# Patient Record
Sex: FEMALE | Race: WHITE | NOT HISPANIC OR LATINO | Employment: UNEMPLOYED | URBAN - METROPOLITAN AREA
[De-identification: names, ages, dates, MRNs, and addresses within clinical notes are randomized per-mention and may not be internally consistent; named-entity substitution may affect disease eponyms.]

---

## 2024-11-04 ENCOUNTER — HOSPITAL ENCOUNTER (INPATIENT)
Facility: HOSPITAL | Age: 54
End: 2024-11-04
Attending: INTERNAL MEDICINE | Admitting: INTERNAL MEDICINE
Payer: COMMERCIAL

## 2024-11-04 PROBLEM — S06.5XAA SDH (SUBDURAL HEMATOMA) (MULTI): Status: ACTIVE | Noted: 2024-11-04

## 2024-11-04 PROCEDURE — 1180000001 HC REHAB PRIVATE ROOM DAILY

## 2024-11-04 PROCEDURE — 2500000001 HC RX 250 WO HCPCS SELF ADMINISTERED DRUGS (ALT 637 FOR MEDICARE OP): Performed by: INTERNAL MEDICINE

## 2024-11-04 RX ORDER — ESCITALOPRAM OXALATE 10 MG/1
10 TABLET ORAL DAILY
Status: DISPENSED | OUTPATIENT
Start: 2024-11-05

## 2024-11-04 RX ORDER — POLYETHYLENE GLYCOL 3350 17 G/17G
17 POWDER, FOR SOLUTION ORAL DAILY PRN
Status: ACTIVE | OUTPATIENT
Start: 2024-11-04

## 2024-11-04 RX ORDER — ACETAMINOPHEN 160 MG/5ML
650 SOLUTION ORAL EVERY 4 HOURS PRN
Status: DISPENSED | OUTPATIENT
Start: 2024-11-04

## 2024-11-04 RX ORDER — GLYCOPYRROLATE 1 MG/1
1 TABLET ORAL DAILY
Status: DISPENSED | OUTPATIENT
Start: 2024-11-05

## 2024-11-04 RX ORDER — DEXTROMETHORPHAN HYDROBROMIDE AND QUINIDINE SULFATE 20; 10 MG/1; MG/1
1 CAPSULE, GELATIN COATED ORAL 2 TIMES DAILY
Status: ON HOLD | COMMUNITY
Start: 2024-09-10

## 2024-11-04 RX ORDER — LISINOPRIL 10 MG/1
10 TABLET ORAL DAILY
Status: DISPENSED | OUTPATIENT
Start: 2024-11-05

## 2024-11-04 RX ORDER — BISACODYL 5 MG
10 TABLET, DELAYED RELEASE (ENTERIC COATED) ORAL DAILY PRN
Status: ACTIVE | OUTPATIENT
Start: 2024-11-04

## 2024-11-04 RX ORDER — LISINOPRIL 10 MG/1
10 TABLET ORAL
Status: ON HOLD | COMMUNITY
Start: 2024-09-10

## 2024-11-04 RX ORDER — ALUMINUM HYDROXIDE, MAGNESIUM HYDROXIDE, AND SIMETHICONE 2400; 240; 2400 MG/30ML; MG/30ML; MG/30ML
30 SUSPENSION ORAL EVERY 6 HOURS PRN
Status: ACTIVE | OUTPATIENT
Start: 2024-11-04

## 2024-11-04 RX ORDER — ROPINIROLE 0.5 MG/1
0.5 TABLET, FILM COATED ORAL NIGHTLY
Status: DISCONTINUED | OUTPATIENT
Start: 2024-11-04 | End: 2024-11-05

## 2024-11-04 RX ORDER — PRAMIPEXOLE DIHYDROCHLORIDE 0.5 MG/1
1 TABLET ORAL ONCE
Status: ON HOLD | COMMUNITY
Start: 2024-09-10

## 2024-11-04 RX ORDER — ACETAMINOPHEN 325 MG/1
650 TABLET ORAL EVERY 4 HOURS PRN
Status: ACTIVE | OUTPATIENT
Start: 2024-11-04

## 2024-11-04 RX ORDER — GLYCOPYRROLATE 1 MG/5ML
1 SOLUTION ORAL
Status: ON HOLD | COMMUNITY
Start: 2024-11-05

## 2024-11-04 SDOH — ECONOMIC STABILITY: HOUSING INSECURITY
IN THE LAST 12 MONTHS, WAS THERE A TIME WHEN YOU WERE NOT ABLE TO PAY THE MORTGAGE OR RENT ON TIME?: PATIENT UNABLE TO ANSWER

## 2024-11-04 SDOH — ECONOMIC STABILITY: TRANSPORTATION INSECURITY
IN THE PAST 12 MONTHS, HAS LACK OF TRANSPORTATION KEPT YOU FROM MEDICAL APPOINTMENTS OR FROM GETTING MEDICATIONS?: PATIENT UNABLE TO ANSWER

## 2024-11-04 SDOH — SOCIAL STABILITY: SOCIAL INSECURITY: ARE THERE ANY APPARENT SIGNS OF INJURIES/BEHAVIORS THAT COULD BE RELATED TO ABUSE/NEGLECT?: NO

## 2024-11-04 SDOH — HEALTH STABILITY: MENTAL HEALTH: HOW MANY DRINKS CONTAINING ALCOHOL DO YOU HAVE ON A TYPICAL DAY WHEN YOU ARE DRINKING?: PATIENT UNABLE TO ANSWER

## 2024-11-04 SDOH — SOCIAL STABILITY: SOCIAL INSECURITY: DOES ANYONE TRY TO KEEP YOU FROM HAVING/CONTACTING OTHER FRIENDS OR DOING THINGS OUTSIDE YOUR HOME?: NO

## 2024-11-04 SDOH — HEALTH STABILITY: MENTAL HEALTH: HOW OFTEN DO YOU HAVE SIX OR MORE DRINKS ON ONE OCCASION?: PATIENT UNABLE TO ANSWER

## 2024-11-04 SDOH — SOCIAL STABILITY: SOCIAL NETWORK
DO YOU BELONG TO ANY CLUBS OR ORGANIZATIONS SUCH AS CHURCH GROUPS, UNIONS, FRATERNAL OR ATHLETIC GROUPS, OR SCHOOL GROUPS?: PATIENT UNABLE TO ANSWER

## 2024-11-04 SDOH — ECONOMIC STABILITY: FOOD INSECURITY
WITHIN THE PAST 12 MONTHS, THE FOOD YOU BOUGHT JUST DIDN'T LAST AND YOU DIDN'T HAVE MONEY TO GET MORE.: PATIENT UNABLE TO ANSWER

## 2024-11-04 SDOH — SOCIAL STABILITY: SOCIAL INSECURITY: HAVE YOU HAD THOUGHTS OF HARMING ANYONE ELSE?: NO

## 2024-11-04 SDOH — ECONOMIC STABILITY: HOUSING INSECURITY: AT ANY TIME IN THE PAST 12 MONTHS, WERE YOU HOMELESS OR LIVING IN A SHELTER (INCLUDING NOW)?: PATIENT UNABLE TO ANSWER

## 2024-11-04 SDOH — SOCIAL STABILITY: SOCIAL INSECURITY: HAVE YOU HAD ANY THOUGHTS OF HARMING ANYONE ELSE?: NO

## 2024-11-04 SDOH — SOCIAL STABILITY: SOCIAL INSECURITY: DO YOU FEEL ANYONE HAS EXPLOITED OR TAKEN ADVANTAGE OF YOU FINANCIALLY OR OF YOUR PERSONAL PROPERTY?: NO

## 2024-11-04 SDOH — HEALTH STABILITY: MENTAL HEALTH: EXPERIENCED ANY OF THE FOLLOWING LIFE EVENTS: OTHER (COMMENT)

## 2024-11-04 SDOH — SOCIAL STABILITY: SOCIAL INSECURITY: ARE YOU OR HAVE YOU BEEN THREATENED OR ABUSED PHYSICALLY, EMOTIONALLY, OR SEXUALLY BY ANYONE?: NO

## 2024-11-04 SDOH — HEALTH STABILITY: MENTAL HEALTH
DO YOU FEEL STRESS - TENSE, RESTLESS, NERVOUS, OR ANXIOUS, OR UNABLE TO SLEEP AT NIGHT BECAUSE YOUR MIND IS TROUBLED ALL THE TIME - THESE DAYS?: PATIENT UNABLE TO ANSWER

## 2024-11-04 SDOH — ECONOMIC STABILITY: INCOME INSECURITY
IN THE PAST 12 MONTHS HAS THE ELECTRIC, GAS, OIL, OR WATER COMPANY THREATENED TO SHUT OFF SERVICES IN YOUR HOME?: PATIENT UNABLE TO ANSWER

## 2024-11-04 SDOH — SOCIAL STABILITY: SOCIAL INSECURITY
WITHIN THE LAST YEAR, HAVE YOU BEEN HUMILIATED OR EMOTIONALLY ABUSED IN OTHER WAYS BY YOUR PARTNER OR EX-PARTNER?: PATIENT UNABLE TO ANSWER

## 2024-11-04 SDOH — SOCIAL STABILITY: SOCIAL INSECURITY: ARE YOU MARRIED, WIDOWED, DIVORCED, SEPARATED, NEVER MARRIED, OR LIVING WITH A PARTNER?: PATIENT UNABLE TO ANSWER

## 2024-11-04 SDOH — SOCIAL STABILITY: SOCIAL INSECURITY: HAS ANYONE EVER THREATENED TO HURT YOUR FAMILY OR YOUR PETS?: NO

## 2024-11-04 SDOH — SOCIAL STABILITY: SOCIAL NETWORK: HOW OFTEN DO YOU ATTEND MEETINGS OF THE CLUBS OR ORGANIZATIONS YOU BELONG TO?: PATIENT UNABLE TO ANSWER

## 2024-11-04 SDOH — HEALTH STABILITY: PHYSICAL HEALTH
HOW OFTEN DO YOU NEED TO HAVE SOMEONE HELP YOU WHEN YOU READ INSTRUCTIONS, PAMPHLETS, OR OTHER WRITTEN MATERIAL FROM YOUR DOCTOR OR PHARMACY?: PATIENT UNABLE TO RESPOND

## 2024-11-04 SDOH — ECONOMIC STABILITY: FOOD INSECURITY
WITHIN THE PAST 12 MONTHS, YOU WORRIED THAT YOUR FOOD WOULD RUN OUT BEFORE YOU GOT THE MONEY TO BUY MORE.: PATIENT UNABLE TO ANSWER

## 2024-11-04 SDOH — ECONOMIC STABILITY: FOOD INSECURITY
HOW HARD IS IT FOR YOU TO PAY FOR THE VERY BASICS LIKE FOOD, HOUSING, MEDICAL CARE, AND HEATING?: PATIENT UNABLE TO ANSWER

## 2024-11-04 SDOH — SOCIAL STABILITY: SOCIAL NETWORK: IN A TYPICAL WEEK, HOW MANY TIMES DO YOU TALK ON THE PHONE WITH FAMILY, FRIENDS, OR NEIGHBORS?: PATIENT UNABLE TO ANSWER

## 2024-11-04 SDOH — HEALTH STABILITY: PHYSICAL HEALTH
ON AVERAGE, HOW MANY DAYS PER WEEK DO YOU ENGAGE IN MODERATE TO STRENUOUS EXERCISE (LIKE A BRISK WALK)?: PATIENT UNABLE TO ANSWER

## 2024-11-04 SDOH — SOCIAL STABILITY: SOCIAL NETWORK: HOW OFTEN DO YOU GET TOGETHER WITH FRIENDS OR RELATIVES?: PATIENT UNABLE TO ANSWER

## 2024-11-04 SDOH — SOCIAL STABILITY: SOCIAL INSECURITY: WITHIN THE LAST YEAR, HAVE YOU BEEN AFRAID OF YOUR PARTNER OR EX-PARTNER?: PATIENT UNABLE TO ANSWER

## 2024-11-04 SDOH — HEALTH STABILITY: MENTAL HEALTH: HOW OFTEN DO YOU HAVE A DRINK CONTAINING ALCOHOL?: PATIENT UNABLE TO ANSWER

## 2024-11-04 SDOH — SOCIAL STABILITY: SOCIAL INSECURITY
WITHIN THE LAST YEAR, HAVE YOU BEEN RAPED OR FORCED TO HAVE ANY KIND OF SEXUAL ACTIVITY BY YOUR PARTNER OR EX-PARTNER?: PATIENT UNABLE TO ANSWER

## 2024-11-04 SDOH — SOCIAL STABILITY: SOCIAL INSECURITY
WITHIN THE LAST YEAR, HAVE YOU BEEN KICKED, HIT, SLAPPED, OR OTHERWISE PHYSICALLY HURT BY YOUR PARTNER OR EX-PARTNER?: PATIENT UNABLE TO ANSWER

## 2024-11-04 SDOH — SOCIAL STABILITY: SOCIAL NETWORK: HOW OFTEN DO YOU ATTEND CHURCH OR RELIGIOUS SERVICES?: PATIENT UNABLE TO ANSWER

## 2024-11-04 SDOH — SOCIAL STABILITY: SOCIAL INSECURITY: DO YOU FEEL UNSAFE GOING BACK TO THE PLACE WHERE YOU ARE LIVING?: NO

## 2024-11-04 SDOH — SOCIAL STABILITY: SOCIAL INSECURITY: ABUSE: ADULT

## 2024-11-04 ASSESSMENT — LIFESTYLE VARIABLES
SKIP TO QUESTIONS 9-10: 0
PRESCIPTION_ABUSE_PAST_12_MONTHS: NO
HOW MANY STANDARD DRINKS CONTAINING ALCOHOL DO YOU HAVE ON A TYPICAL DAY: PATIENT UNABLE TO ANSWER
HOW OFTEN DO YOU HAVE 6 OR MORE DRINKS ON ONE OCCASION: PATIENT UNABLE TO ANSWER
AUDIT-C TOTAL SCORE: -1
HOW OFTEN DO YOU HAVE A DRINK CONTAINING ALCOHOL: PATIENT UNABLE TO ANSWER
SKIP TO QUESTIONS 9-10: 0
SUBSTANCE_ABUSE_PAST_12_MONTHS: NO
AUDIT-C TOTAL SCORE: -1
AUDIT-C TOTAL SCORE: -1

## 2024-11-04 ASSESSMENT — ACTIVITIES OF DAILY LIVING (ADL)
BATHING: REQUIRED ASSISTANCE
DOMESTIC_CHORES: REQUIRED ASSISTANCE
LACK_OF_TRANSPORTATION: PATIENT UNABLE TO ANSWER
LACK_OF_TRANSPORTATION: PATIENT UNABLE TO ANSWER
BLADDER: INCONTINENT
BOWEL: INCONTINENT
TOILETING: REQUIRED ASSISTANCE
DRESSING: REQUIRED ASSISTANCE
ASSISTIVE_DEVICE: WHEELCHAIR

## 2024-11-04 ASSESSMENT — COLUMBIA-SUICIDE SEVERITY RATING SCALE - C-SSRS
6. HAVE YOU EVER DONE ANYTHING, STARTED TO DO ANYTHING, OR PREPARED TO DO ANYTHING TO END YOUR LIFE?: NO
1. IN THE PAST MONTH, HAVE YOU WISHED YOU WERE DEAD OR WISHED YOU COULD GO TO SLEEP AND NOT WAKE UP?: NO
2. HAVE YOU ACTUALLY HAD ANY THOUGHTS OF KILLING YOURSELF?: NO

## 2024-11-04 ASSESSMENT — BRIEF INTERVIEW FOR MENTAL STATUS (BIMS)
WHAT YEAR IS IT: NO ANSWER
COGNITIVE PATTERN ASSESSMENT USED: STAFF ASSESSMENT;BIMS
WHAT MONTH IS IT: NO ANSWER
INITIAL REPETITION OF BED BLUE SOCK - FIRST ATTEMPT: NO ANSWER
ASKED TO RECALL BLUE: NO ANSWER
ASKED TO RECALL BED: NO ANSWER
GENERAL MEMORY AND RECALL ABILITY: RECOGNIZES APPROPRIATE HEALTHCARE SETTING;CURRENT SEASON
WHAT DAY OF THE WEEK IS IT: NO ANSWER
ASKED TO RECALL SOCK: NO ANSWER
BIMS SUMMARY SCORE: 99

## 2024-11-04 ASSESSMENT — PATIENT HEALTH QUESTIONNAIRE - PHQ9
SUM OF ALL RESPONSES TO PHQ9 QUESTIONS 1 & 2: 2
2. FEELING DOWN, DEPRESSED OR HOPELESS: SEVERAL DAYS
1. LITTLE INTEREST OR PLEASURE IN DOING THINGS: SEVERAL DAYS

## 2024-11-04 ASSESSMENT — PAIN - FUNCTIONAL ASSESSMENT: PAIN_FUNCTIONAL_ASSESSMENT: 0-10

## 2024-11-04 ASSESSMENT — PAIN SCALES - GENERAL: PAINLEVEL_OUTOF10: 0 - NO PAIN

## 2024-11-05 PROBLEM — G23.1 PROGRESSIVE SUPRANUCLEAR PALSY (MULTI): Status: ACTIVE | Noted: 2024-11-05

## 2024-11-05 PROBLEM — G20.B2 PARKINSON'S DISEASE WITH DYSKINESIA AND FLUCTUATING MANIFESTATIONS: Status: ACTIVE | Noted: 2024-11-05

## 2024-11-05 LAB
ANION GAP SERPL CALCULATED.3IONS-SCNC: 13 MMOL/L (ref 10–20)
BUN SERPL-MCNC: 33 MG/DL (ref 6–23)
CALCIUM SERPL-MCNC: 9.7 MG/DL (ref 8.6–10.3)
CHLORIDE SERPL-SCNC: 104 MMOL/L (ref 98–107)
CO2 SERPL-SCNC: 28 MMOL/L (ref 21–32)
CREAT SERPL-MCNC: 0.57 MG/DL (ref 0.5–1.05)
EGFRCR SERPLBLD CKD-EPI 2021: >90 ML/MIN/1.73M*2
ERYTHROCYTE [DISTWIDTH] IN BLOOD BY AUTOMATED COUNT: 12.8 % (ref 11.5–14.5)
GLUCOSE SERPL-MCNC: 96 MG/DL (ref 74–99)
HCT VFR BLD AUTO: 41.4 % (ref 36–46)
HGB BLD-MCNC: 13.3 G/DL (ref 12–16)
MCH RBC QN AUTO: 30.9 PG (ref 26–34)
MCHC RBC AUTO-ENTMCNC: 32.1 G/DL (ref 32–36)
MCV RBC AUTO: 96 FL (ref 80–100)
NRBC BLD-RTO: 0 /100 WBCS (ref 0–0)
PLATELET # BLD AUTO: 281 X10*3/UL (ref 150–450)
POTASSIUM SERPL-SCNC: 4.6 MMOL/L (ref 3.5–5.3)
RBC # BLD AUTO: 4.3 X10*6/UL (ref 4–5.2)
SODIUM SERPL-SCNC: 140 MMOL/L (ref 136–145)
WBC # BLD AUTO: 7.1 X10*3/UL (ref 4.4–11.3)

## 2024-11-05 PROCEDURE — 2500000001 HC RX 250 WO HCPCS SELF ADMINISTERED DRUGS (ALT 637 FOR MEDICARE OP): Performed by: INTERNAL MEDICINE

## 2024-11-05 PROCEDURE — 97112 NEUROMUSCULAR REEDUCATION: CPT | Mod: GP

## 2024-11-05 PROCEDURE — 97163 PT EVAL HIGH COMPLEX 45 MIN: CPT | Mod: GP

## 2024-11-05 PROCEDURE — 99223 1ST HOSP IP/OBS HIGH 75: CPT | Performed by: INTERNAL MEDICINE

## 2024-11-05 PROCEDURE — 97116 GAIT TRAINING THERAPY: CPT | Mod: GP

## 2024-11-05 PROCEDURE — 97530 THERAPEUTIC ACTIVITIES: CPT | Mod: GP

## 2024-11-05 PROCEDURE — 97110 THERAPEUTIC EXERCISES: CPT | Mod: GP

## 2024-11-05 PROCEDURE — 97530 THERAPEUTIC ACTIVITIES: CPT | Mod: GO,CO

## 2024-11-05 PROCEDURE — 92523 SPEECH SOUND LANG COMPREHEN: CPT | Mod: GN

## 2024-11-05 PROCEDURE — 97535 SELF CARE MNGMENT TRAINING: CPT | Mod: GO

## 2024-11-05 PROCEDURE — 92610 EVALUATE SWALLOWING FUNCTION: CPT | Mod: GN

## 2024-11-05 PROCEDURE — 80048 BASIC METABOLIC PNL TOTAL CA: CPT | Performed by: INTERNAL MEDICINE

## 2024-11-05 PROCEDURE — 1180000001 HC REHAB PRIVATE ROOM DAILY

## 2024-11-05 PROCEDURE — 36415 COLL VENOUS BLD VENIPUNCTURE: CPT | Performed by: INTERNAL MEDICINE

## 2024-11-05 PROCEDURE — 97166 OT EVAL MOD COMPLEX 45 MIN: CPT | Mod: GO

## 2024-11-05 PROCEDURE — 85027 COMPLETE CBC AUTOMATED: CPT | Performed by: INTERNAL MEDICINE

## 2024-11-05 RX ORDER — ESCITALOPRAM OXALATE 10 MG/1
10 TABLET ORAL
Status: ON HOLD | COMMUNITY
Start: 2024-09-10

## 2024-11-05 RX ORDER — PRAMIPEXOLE DIHYDROCHLORIDE 0.25 MG/1
0.5 TABLET ORAL NIGHTLY
Status: DISPENSED | OUTPATIENT
Start: 2024-11-05

## 2024-11-05 RX ORDER — ESCITALOPRAM OXALATE 20 MG/1
0.5 TABLET ORAL
Status: ON HOLD | COMMUNITY
Start: 2024-05-09 | End: 2024-11-05 | Stop reason: SDUPTHER

## 2024-11-05 RX ORDER — CARBIDOPA AND LEVODOPA 25; 100 MG/1; MG/1
2 TABLET ORAL 3 TIMES DAILY
Status: ON HOLD | COMMUNITY
Start: 2024-09-10 | End: 2024-12-09

## 2024-11-05 RX ORDER — NYSTATIN 100000 [USP'U]/ML
5 SUSPENSION ORAL 3 TIMES DAILY
Status: DISPENSED | OUTPATIENT
Start: 2024-11-05 | End: 2024-11-12

## 2024-11-05 SDOH — ECONOMIC STABILITY: FOOD INSECURITY: WITHIN THE PAST 12 MONTHS, YOU WORRIED THAT YOUR FOOD WOULD RUN OUT BEFORE YOU GOT THE MONEY TO BUY MORE.: NEVER TRUE

## 2024-11-05 SDOH — ECONOMIC STABILITY: FOOD INSECURITY: WITHIN THE PAST 12 MONTHS, THE FOOD YOU BOUGHT JUST DIDN'T LAST AND YOU DIDN'T HAVE MONEY TO GET MORE.: NEVER TRUE

## 2024-11-05 SDOH — SOCIAL STABILITY: SOCIAL INSECURITY: WITHIN THE LAST YEAR, HAVE YOU BEEN AFRAID OF YOUR PARTNER OR EX-PARTNER?: NO

## 2024-11-05 SDOH — HEALTH STABILITY: PHYSICAL HEALTH
HOW OFTEN DO YOU NEED TO HAVE SOMEONE HELP YOU WHEN YOU READ INSTRUCTIONS, PAMPHLETS, OR OTHER WRITTEN MATERIAL FROM YOUR DOCTOR OR PHARMACY?: SOMETIMES

## 2024-11-05 SDOH — HEALTH STABILITY: PHYSICAL HEALTH: ON AVERAGE, HOW MANY DAYS PER WEEK DO YOU ENGAGE IN MODERATE TO STRENUOUS EXERCISE (LIKE A BRISK WALK)?: 0 DAYS

## 2024-11-05 SDOH — SOCIAL STABILITY: SOCIAL INSECURITY
WITHIN THE LAST YEAR, HAVE YOU BEEN KICKED, HIT, SLAPPED, OR OTHERWISE PHYSICALLY HURT BY YOUR PARTNER OR EX-PARTNER?: NO

## 2024-11-05 SDOH — ECONOMIC STABILITY: HOUSING INSECURITY: IN THE LAST 12 MONTHS, WAS THERE A TIME WHEN YOU WERE NOT ABLE TO PAY THE MORTGAGE OR RENT ON TIME?: NO

## 2024-11-05 SDOH — SOCIAL STABILITY: SOCIAL INSECURITY: WITHIN THE LAST YEAR, HAVE YOU BEEN HUMILIATED OR EMOTIONALLY ABUSED IN OTHER WAYS BY YOUR PARTNER OR EX-PARTNER?: NO

## 2024-11-05 SDOH — ECONOMIC STABILITY: HOUSING INSECURITY: AT ANY TIME IN THE PAST 12 MONTHS, WERE YOU HOMELESS OR LIVING IN A SHELTER (INCLUDING NOW)?: NO

## 2024-11-05 SDOH — ECONOMIC STABILITY: INCOME INSECURITY: IN THE PAST 12 MONTHS HAS THE ELECTRIC, GAS, OIL, OR WATER COMPANY THREATENED TO SHUT OFF SERVICES IN YOUR HOME?: NO

## 2024-11-05 SDOH — ECONOMIC STABILITY: HOUSING INSECURITY: AT ANY TIME IN THE PAST 12 MONTHS, WERE YOU HOMELESS OR LIVING IN A SHELTER (INCLUDING NOW)?: PATIENT UNABLE TO ANSWER

## 2024-11-05 SDOH — SOCIAL STABILITY: SOCIAL INSECURITY
WITHIN THE LAST YEAR, HAVE YOU BEEN RAPED OR FORCED TO HAVE ANY KIND OF SEXUAL ACTIVITY BY YOUR PARTNER OR EX-PARTNER?: NO

## 2024-11-05 SDOH — ECONOMIC STABILITY: FOOD INSECURITY: HOW HARD IS IT FOR YOU TO PAY FOR THE VERY BASICS LIKE FOOD, HOUSING, MEDICAL CARE, AND HEATING?: NOT VERY HARD

## 2024-11-05 SDOH — ECONOMIC STABILITY: TRANSPORTATION INSECURITY: IN THE PAST 12 MONTHS, HAS LACK OF TRANSPORTATION KEPT YOU FROM MEDICAL APPOINTMENTS OR FROM GETTING MEDICATIONS?: NO

## 2024-11-05 SDOH — ECONOMIC STABILITY: HOUSING INSECURITY: IN THE PAST 12 MONTHS, HOW MANY TIMES HAVE YOU MOVED WHERE YOU WERE LIVING?: 0

## 2024-11-05 SDOH — SOCIAL STABILITY: SOCIAL INSECURITY: WITHIN THE LAST YEAR, HAVE YOU BEEN AFRAID OF YOUR PARTNER OR EX-PARTNER?: PATIENT UNABLE TO ANSWER

## 2024-11-05 ASSESSMENT — ENCOUNTER SYMPTOMS
DIARRHEA: 0
NAUSEA: 0
VOMITING: 0
CHILLS: 0
FEVER: 0
HEADACHES: 0
APPETITE CHANGE: 0
ABDOMINAL PAIN: 0
COUGH: 0
SHORTNESS OF BREATH: 0

## 2024-11-05 ASSESSMENT — ACTIVITIES OF DAILY LIVING (ADL)
HOME_MANAGEMENT_TIME_ENTRY: 30
EFFECT OF PAIN ON DAILY ACTIVITIES: NO EFFECT
LACK_OF_TRANSPORTATION: PATIENT UNABLE TO ANSWER
ADL_ASSISTANCE: NEEDS ASSISTANCE
LACK_OF_TRANSPORTATION: NO
ADL_ASSISTANCE: NEEDS ASSISTANCE
BATHING_ASSISTANCE: MAXIMAL

## 2024-11-05 ASSESSMENT — BRIEF INTERVIEW FOR MENTAL STATUS (BIMS)
BIMS SUMMARY SCORE: 99
WHAT YEAR IS IT: NO ANSWER
ASKED TO RECALL SOCK: NO ANSWER
ASKED TO RECALL BLUE: NO ANSWER
INITIAL REPETITION OF BED BLUE SOCK - FIRST ATTEMPT: NO ANSWER
ASKED TO RECALL BED: NO ANSWER
BIMS SUMMARY SCORE: 99
GENERAL MEMORY AND RECALL ABILITY: NONE OF THE GIVEN OPTIONS
COGNITIVE PATTERN ASSESSMENT USED: BIMS
ASKED TO RECALL BED: NO ANSWER
WHAT DAY OF THE WEEK IS IT: NO ANSWER
ASKED TO RECALL SOCK: NO ANSWER
INITIAL REPETITION OF BED BLUE SOCK - FIRST ATTEMPT: NO ANSWER
WHAT DAY OF THE WEEK IS IT: NO ANSWER
WHAT MONTH IS IT: NO ANSWER
ASKED TO RECALL BLUE: NO ANSWER
WHAT MONTH IS IT: NO ANSWER
WHAT YEAR IS IT: NO ANSWER
COGNITIVE PATTERN ASSESSMENT USED: STAFF ASSESSMENT;BIMS

## 2024-11-05 ASSESSMENT — PAIN - FUNCTIONAL ASSESSMENT
PAIN_FUNCTIONAL_ASSESSMENT: 0-10

## 2024-11-05 ASSESSMENT — COGNITIVE AND FUNCTIONAL STATUS - GENERAL
DRESSING REGULAR UPPER BODY CLOTHING: A LOT
PERSONAL GROOMING: A LOT
TOILETING: A LOT
DAILY ACTIVITIY SCORE: 12
EATING MEALS: A LOT
DRESSING REGULAR LOWER BODY CLOTHING: A LOT
HELP NEEDED FOR BATHING: A LOT

## 2024-11-05 ASSESSMENT — PAIN SCALES - GENERAL
PAINLEVEL_OUTOF10: 0 - NO PAIN
PAINLEVEL_OUTOF10: 1
PAINLEVEL_OUTOF10: 1
PAINLEVEL_OUTOF10: 0 - NO PAIN
PAINLEVEL_OUTOF10: 1
PAINLEVEL_OUTOF10: 0 - NO PAIN
PAINLEVEL_OUTOF10: 0 - NO PAIN

## 2024-11-05 NOTE — CARE PLAN
Problem: ADLs LTM Goal  Goal: Patient will perform UB and LB bathing seated with contact guard assist level of assistance and grab bars and shower chair.  Outcome: Progressing  Goal: Patient with complete upper body dressing with minimal assist  level of assistance donning and doffing all UE clothes with no adaptive equipment while supported sitting.  Outcome: Progressing  Goal: Patient with complete lower body dressing with contact guard assist  level of assistance donning and doffing all LE clothes  with PRN adaptive equipment while supported sitting.  Outcome: Progressing  Goal: Patient will feed self with supervision level of assistance and verbal cues using PRN adaptive equipment.  Outcome: Progressing  Goal: Patient will complete daily grooming tasks brushing teeth and washing face/hair with supervision level of assistance and PRN adaptive equipment while supported sitting.  Outcome: Progressing  Goal: Patient will complete toileting including clothing management/hygiene with contact guard assist  level of assistance and grab bars and bedside commode.  Outcome: Progressing     Problem: TRANSFERS LTM Goals  Goal: Patient will complete functional transfer to toilet with front wheeled walker with contact guard assist  level of assistance.  Outcome: Progressing  Goal: Patient will complete functional car transfer with front wheeled walker with contact guard assist level of assistance.  Outcome: Progressing

## 2024-11-05 NOTE — PROGRESS NOTES
Occupational Therapy       11/05/24 3294-0544   OT Last Visit   OT Received On 11/05/24   General   Reason for Referral decreased mobility, SDH   Referred By Dr. Patterson   Past Medical History Relevant to Rehab HTN, anxiety, dysphagia, Parkinson's, progressive supranuclear palsy, dystona   Prior to Session Communication Bedside nurse   Patient Position Received Up in chair;Alarm off, not on at start of session   Preferred Learning Style verbal;auditory   General Comment Pt agreeable to skilled OT intervention up in reclining w/c headrest in place.   Precautions   Medical Precautions Fall precautions;Swallowing precautions   Precautions Comment puree diet and thin liquids   Pain Assessment   Pain Assessment 0-10   0-10 (Numeric) Pain Score 0 - No pain   Cognition   Orientation Level Oriented X4   Following Commands Other (Comment)  (confirmation with thumbs up/down gesture)   Processing Speed No response   Coordination   Movements are Fluid and Coordinated No   Upper Body Coordination rigidity noted   RUE    RUE  X   LUE    LUE X   Feeding   Feeding Level of Assistance Moderate assistance   Feeding Where Assessed Wheelchair   Feeding Comments Pt delayed swallow pureed/ increased time to process swallowing liquids/ limited opening mouth   Dynamic Sitting Balance   Dynamic Sitting-Balance Support Feet supported   Dynamic Sitting-Level of Assistance Contact guard   Dynamic Sitting-Balance Forward lean   Dynamic Sitting-Comments w/c level Pt using arm rest balance point      11/05/24 [id number]   OT Adult Other Outcome Measures   9 Hole Peg Test RUE placement 7 pegs 3:30 minutes, LUE placement 2 pegs 1:05 minutes   Box and Block RUE 15 blocks/minute, LUE 11 blocks/minute   Other Outcome Measures  Strength: RUE 45#, LUE 25#

## 2024-11-05 NOTE — PROGRESS NOTES
Physical Therapy Initial Evaluation and Treatment       11/05/24 9866-7750   PT  Visit   PT Received On 11/05/24   General   Reason for Referral decreased mobility, SDH   Referred By Dr. Patterson   Past Medical History Relevant to Rehab HTN, anxiety, dysphagia, Parkinson's, progressive supranuclear palsy, dystona   Prior to Session Communication Bedside nurse  (OT)   Patient Position Received   (seated egde of bed)   Preferred Learning Style verbal;auditory   Precautions   Hearing/Visual Limitations slowed smooth pursuits, saccades with limited eye movement especially past midline due to supranuclear palsy and resulting limited movement of eyes. Convergence/divergence absent. Must turn head to look past midline. Lower half of visual field appears to be limited. Has prism glasses at home.   Medical Precautions Fall precautions;Swallowing precautions   Precautions Comment puree diet and thin liquids   Pain Assessment   Pain Assessment 0-10   0-10 (Numeric) Pain Score 1   Pain Location Hip   Pain Orientation Left   Pain Radiating Towards left buttocks   Cognition   Arousal/Alertness Delayed responses to stimuli   Orientation Level Oriented X4   Following Commands Follows one step commands with increased time   Cognition Comments patient consistnent responds appropriately to yes/no questions using thumbs up/thumbs/down. Limited ability to talk due to rigid facial musculature   Insight WFL   Impulsive WFL   Processing Speed Delayed   Home Living   Type of Home House   Lives With Spouse;Dependent children  (10 y/o son, spouse works, father in law assist during day, home helath aide 2x/week)   Home Adaptive Equipment Wheelchair-manual   Home Layout One level   Home Access Stairs to enter without rails   Entrance Stairs-Rails None   Entrance Stairs-Number of Steps 2   Bathroom Shower/Tub Walk-in shower   Bathroom Equipment Grab bars in shower;Shower chair with back;Raised toilet seat with rails;Hand-held shower hose    Bathroom Accessibility wheelchair fits through bathroom door   Home Living Comments patient reports family bumps her in/out of home in wheelchair   Prior Function Per Pt/Caregiver Report   Level of Wood Needs assistance with ADLs;Needs assistance with homemaking;Needs assistance with functional transfers   Receives Help From Family;Home health  (spouse works full time and performs IADLS, father in law assists throughout the day; HHA 2x/week for 1 hour to assist with ADLs.)   ADL Assistance Needs assistance   Bath   (needs assist)   Toileting   (needs assist)   Dressing   (needs assist)   Grooming   (needs assist)   Feeding   (needs assist)   Homemaking Assistance   (family does allhomemaking)   Ambulatory Assistance Needs assistance   Hand Dominance Right   Prior Function Comments primarily uses wheelchair for mobility, short distance amb supporting self on hallway walls. Multiple recent  and daily falls at home with most recent injury.   Activity Tolerance   Endurance Tolerates 10 - 20 min exercise with multiple rests   Sensation   Light Touch No apparent deficits   Proprioception No apparent deficits   Perception   Initiation Cues to initiate tasks   Motor Planning   (delayed)   Perseveration Not present   Coordination   Movements are Fluid and Coordinated No   Lower Body Coordination rigidity noted   Trunk Coordination rigidity noted with decreased trunk flexion noted. Leans posterior with transfers.   Finger to Nose Bradykinesia   Rapid Alternating Movements Bradykinesia   Alternating Toe Taps Bradykinesia   Postural Control   Postural Control Impaired   Head Control tight neck musculature with head held in right rotation, left sidebending and slight extension   Trunk Control rigid trunk with posterior lean   Righting Reactions impaired/slowed   Protective Responses impaired/slowed   Posture Comment rigidity noted x 4 extemeties, neck and trunk   Static Sitting Balance   Static Sitting-Balance Support  Feet supported;Bilateral upper extremity supported   Static Sitting-Level of Assistance Contact guard   Dynamic Sitting Balance   Dynamic Sitting-Balance Support Feet supported;Bilateral upper extremity supported   Dynamic Sitting-Level of Assistance Minimum assistance   Dynamic Sitting-Comments FIST 19/56   Static Standing Balance   Static Standing-Balance Support Bilateral upper extremity supported   Static Standing-Level of Assistance Minimum assistance;Moderate assistance   Static Standing-Comment/Number of Minutes with u step walker   Dynamic Standing Balance   Dynamic Standing-Balance Support Bilateral upper extremity supported   Dynamic Standing-Level of Assistance Moderate assistance   Dynamic Standing-Comments with U step walker. TUG 3 minutes, 14 sec with ustep walker and assist   Bed Mobility 1   Bed Mobility 1 Supine to sitting;Sitting to supine   Level of Assistance 1 Contact guard   Bed Mobility Comments 1 HOB flat, no rail   Bed Mobility 2   Bed Mobility  2 Rolling right;Rolling left   Level of Assistance 2 Close supervision   Bed Mobility Comments 2 HOB flat, no rail   Transfer 1   Technique 1 Sit to stand;Stand to sit   Transfer Device 1 Walker   Transfer Level of Assistance 1 Moderate assistance   Trials/Comments 1 step by step cues . Rigid trunk with posterior lean. Cues for trunk flexion and COG over MARTINA   Transfers 2   Transfer From 2 Bed to;Wheelchair to   Transfer to 2 Wheelchair;Bed   Technique 2 Stand pivot   Transfer Device 2 Gait belt   Transfer Level of Assistance 2 Moderate assistance;Maximum verbal cues   Trials/Comments 2 rigid trunk with posterio lean, NBOS. Step by step cues for safe technique   Ambulation/Gait Training 1   Surface 1 Level tile   Device 1 Aguilar rail   Gait Support Devices Gait belt;Wheelchair follow   Assistance 1 Minimum assistance;Moderate assistance   Quality of Gait 1 NBOS;Inconsistent stride length   Comments/Distance (ft) 1 10', cues to increase MARTINA, for upright  posture due to posterio lean. Tends to amb on toes. Cues for foot flat.   Ambulation/Gait Training 2   Surface 2 Level tile   Gait Support Devices Gait belt;Wheelchair follow   Assistance 2 Minimum assistance;Moderate assistance   Quality of Gait 2 NBOS;Inconsistent stride length   Comments/Distance (ft) 2 30' x 2 slow pace. Cues to increase MARTINA, patient atempts to correct. Assist to control forward movement of ustep. Slow adolph. Cues to not lean posterior.    Object From Floor   Comments unsafe   Wheelchair Activities   Propulsion Type 1 Manual   Level 1 Level tile   Method 1 Right upper extremity;Left upper extremity   Level of Assistance 1 Minimum assistance   Description/Details 1 50' includes turns, cues for direction and obstacle avoidance   Cervical Spine    Cervical Spine X   Cervical Spine Comment tight neck musculature with head held in right rotation, left sidebending and slight extension. Gentle massage and gentle stretching with some relief reported by patient via thumbs up sign. Able to achieve midline position.   Lumbar Spine    Lumbar Spine  X   Lumbar Spine Comment trunk rigidity noted with decresed rotation and flexion noted   AROM RLE (degrees)   RLE AROM Comment rigidity noted with movements but ROM WFL except hamstring contracture at approx 20-25 degrees knee flex   Strength RLE   R Hip Flexion 4/5   R Hip Extension 4-/5   R Hip ABduction 4/5   R Hip ADduction 4/5   R Knee Flexion 4-/5   R Knee Extension 4/5   R Ankle Dorsiflexion 4/5   R Ankle Plantar Flexion 4/5   AROM LLE (degrees)   LLE AROM Comment rigidity noted with movements but ROM WFL except hamstring contracture at approx 20-25 degrees knee flex   Strength LLE   L Hip Flexion 4/5   L Hip Extension 4-/5   L Hip ABduction 4/5   L Hip ADduction 4/5   L Knee Flexion 4-/5   L Knee Extension 4/5   L Ankle Dorsiflexion 4/5   L Ankle Plantar Flexion 4/5   PT Assessment   PT Assessment Results Decreased strength;Decreased range of  motion;Decreased endurance;Impaired balance;Decreased mobility;Decreased coordination;Impaired vision;Impaired tone   Rehab Prognosis Good  (for goals listed)   Evaluation/Treatment Tolerance Patient tolerated treatment well   Strengths Living arrangement secure;Support of Caregivers   Barriers to Participation Comorbidities   End of Session Communication Bedside nurse   Assessment Comment Patient is a 54year old female who fell at home and sustained SDH. Patient has history of Parkinson's and progressive supranuclear palsy and required assist at home for mobility and ADLS. Patient is currently below baseline function and requires more assist. Patient presents with decreased strength, balance, activity tolerance, coordination.This has resulted in the inability to transfer, ambulate, even short distances safely. Patient will benefit from therapeutic exercise to improve strength, ROM and activity tolerance; therapeutic activity to improve safe mobility; neuromuscular reeducation to improve coordination, balance, and motor planning; gait training to promote safe ambulation; modalities PRN for pain relief. Patient will benefit from intense rehab to address all impairments and activity limitations to facilitate improved functional  mobility and return to home safely with family support and assist.   End of Session Patient Position   (Tilt in space wheelchair with slight recline and head supported)   IP OR SWING BED PT PLAN   Inpatient or Swing Bed Inpatient   PT Plan   Treatment/Interventions Bed mobility;Transfer training;Gait training;Balance training;Neuromuscular re-education;Strengthening;Range of motion;Therapeutic exercise;Therapeutic activity;Home exercise program;Positioning;Postural re-education;Wheelchair management   PT Frequency 90 min/5 days per week  (and 45 minutes 1x/week)   PT Discharge Recommendations   (24 hour sup/assist resumed at home)   Equipment Recommended upon Discharge   (wheelchair with head  support, ramp at e entry, other DME TBD)   PT Recommended Transfer Status Assist x1   PT - OK to Discharge Yes          Problem: IRF PT LTG Problem  Goal: Patient will roll right and left with independent assist to facilitate mobility.  Outcome: Progressing  Goal: Patient will transfer supine to sit and sit to supine with supervision assist to facilitate mobility.  Outcome: Progressing  Goal: Patient will transfer sit to stand and stand to sit with CGA assist to facilitate mobility.  Outcome: Progressing  Goal: Patient will transfer bed to chair and chair to bed with contact guard assist to facilitate mobility.  Outcome: Progressing  Goal: Patient will amb 100 feet least restrictive device including two turns on even surface with minimal assist to facilitate safe mobility.    Outcome: Progressing  Goal: Patient will propel wheelchair 150 feet with two turns on even surface with independent assist to facilitate safe mobility.   Outcome: Progressing  Goal: Patient will perform TUG with least restrictive assistive device in 2 minutes or less to promote mobility and reduce fall risk with dynamic standing tasks.   Outcome: Progressing  Goal: Patient will improve FIST score to  40/56  to promote mobility and safety with seated activities.   Outcome: Progressing  Goal: Patient will increase BLE strength to 4+/5 to improve functional mobility.   Outcome: Progressing  Goal: Patient will reduce knee flexion contracture by 10 degrees  Outcome: Progressing

## 2024-11-05 NOTE — PROGRESS NOTES
Occupational Therapy Evaluation and Treatment     11/05/24 7284-7237   OT Last Visit   OT Received On 11/05/24   General   Reason for Referral SDH (subdural hematoma, Frequent falls, Parkinson's disease, Progressive supranuclear palsy, Altered mental status, Dysphagia, Orthostatic hypotension, CT head w/o contrast on 10/27/24 No mass or acute hemorrhage. No evidence of acute infarct. Slightly decreased left frontal subdural hematoma.   Referred By Dr. Patterson   Past Medical History Relevant to Rehab HTN, anxiety, dysphagia, Parkinson's, progressive supranuclear palsy   Missed Visit No   Family/Caregiver Present No   Prior to Session Communication Bedside nurse   Patient Position Received Bed, 3 rail up   Preferred Learning Style verbal;auditory   General Comment Pt agreeable to therapy.   Precautions   Hearing/Visual Limitations slowed smooth pursuits, saccades with limited eye movement especially past midline due to supranuclear palsy and resulting limited movement of eyes. Convergence/divergence absent. Must turn head to look past midline. Lower half of visual field appears to be limited.   Medical Precautions Fall precautions   Precautions Comment Dysphagia puree level 1 and thin liquids   Pain Assessment   Pain Assessment 0-10   0-10 (Numeric) Pain Score 1   Pain Type   (unable to verbalize; communicated pain level with use of fingers)   Pain Location Hip   Pain Orientation Left   Pain Radiating Towards left buttocks   Clinical Progression Not changed   Effect of Pain on Daily Activities no effect   Patient's Stated Pain Goal   (unable to verbalize)   Pain Interventions   (Pt transferred OOB to )   Response to Interventions effective   Cognition   Arousal/Alertness Delayed responses to stimuli   Orientation Level Oriented X4   Following Commands Follows one step commands with increased time   Cognition Comments patient consistnent responds appropriately to yes/no questions using thumbs up/thumbs/down. Limited  ability to talk due to rigid facial musculature   Insight WFL   Impulsive WFL   Processing Speed Delayed   Home Living   Type of Home House   Lives With Spouse;Dependent children  (10 y.o. son)   Home Adaptive Equipment Wheelchair-manual  (caregivers assist with propulsion of MWC; short distant ambulation to bathroom uses wall for support)   Home Layout One level   Home Access Stairs to enter without rails   Entrance Stairs-Rails None   Entrance Stairs-Number of Steps 2   Bathroom Shower/Tub Walk-in shower   Bathroom Toilet Adaptive toilet seating   Bathroom Equipment Grab bars in shower   Bathroom Accessibility wheelchair fits through bathroom door   Home Living Comments patient reports family bumps her in/out of home in wheelchair   Prior Function Per Pt/Caregiver Report   Level of [place] Needs assistance with ADLs;Needs assistance with homemaking;Needs assistance with functional transfers   Receives Help From Family;Home health  (father law assists throughout the day; HHA 2x/week for 1 hour to assist with ADLs; spouse works full time and performs  IADLS)   ADL Assistance Needs assistance   Bath   (needs assistance)   Toileting   (need assistance)   Dressing   (needs assistance)   Grooming   (needs assistance)   Feeding   (needs assistance)   Homemaking Assistance Needs assistance  (family performs all home management)   Meal Prep Total   Laundry Total   Vacuuming Total   Cleaning Total   Yard Work Total   Driving/Transportation Family/Friend   Shopping Total   Homemaking Assistance Comments family performs all home management tasks   Ambulatory Assistance Needs assistance   Hand Dominance Right   Prior Function Comments primarily uses wheelchair for mobility, short distance amb supporting self on hallway walls. Multiple recent and daily falls at home with most recent injury.   IADL History   Homemaking Responsibilities No   Current License No   Mode of Transportation Family   IADL Comments spouse completes  IADLs, provides transportation, and assists with med management   ADL   Eating Assistance Maximal  (plan to trial cup with lid)   Eating Deficit Verbal cueing;Increased time to complete;Bringing food to mouth assist;Scoop assist  (Dysphagia puree level 1 and thin liquids)   Grooming Assistance Moderate   Grooming Deficit Wash/dry face;Brushing hair   Bathing Assistance Maximal   Bathing Deficit Verbal cueing;Increased time to complete ;Chest;Right arm;Left arm;Perineal area;Buttocks;Right lower leg including foot;Right upper leg;Left upper leg;Left lower leg including foot  (bathing completed at bed level; pt able to assist with UB with decreased thoroughness, assisted with UB/LB bathing including buttocks)   UE Dressing Assistance Maximal   UE Dressing Deficit Increased time to complete;Thread RUE;Thread LUE;Pull over head;Pull down in back  (to miko sweatshirt in bed)   LE Dressing Assistance Maximal   LE Dressing Deficit Don/doff R sock;Don/doff L sock;Thread RLE into pants;Thread LLE into pants;Pull up over hips  (pt able to bridge in bed to manage pants over hips)   Toileting Assistance with Device Total   Toileting Deficit Use of bedpan/urinal setup;Clothing management up;Clothing management down;Perineal hygiene  (wears a tabbed brief)   Functional Assistance Maximal   Functional Deficit Steadying;Verbal cueing;Increased time to complete   Activity Tolerance   Endurance   (pt actively participates in bathing/grooming/dressing activity at bed level at a delayed pace)   Bed Mobility   Bed Mobility Yes   Bed Mobility 1   Bed Mobility 1 Supine to sitting   Level of Assistance 1 Contact guard   Bed Mobility Comments 1 HOB flat, no rail   Bed Mobility 2   Bed Mobility  2 Rolling right;Rolling left   Level of Assistance 2 Close supervision   Bed Mobility Comments 2 HOB flat, no rail   Transfer 1   Technique 1 Sit to stand   Transfer Device 1 Walker   Transfer Level of Assistance 1 Moderate assistance    Trials/Comments 1 vcs provided to sequence task. Pt presents with a posterior lean.   Transfers 2   Transfer From 2 Bed to   Transfer to 2 Wheelchair   Technique 2 Stand pivot   Transfer Device 2 Gait belt   Transfer Level of Assistance 2 Moderate assistance   Trials/Comments 2 vcs to sequence task   Static Sitting Balance   Static Sitting-Balance Support Bilateral upper extremity supported   Static Sitting-Level of Assistance Close supervision   Static Sitting-Comment/Number of Minutes approximately 10 minutes seated at EOB   Static Standing Balance   Static Standing-Balance Support Bilateral upper extremity supported   Static Standing-Level of Assistance Moderate assistance   Vision - Basic Assessment   Patient Visual Report   (spouse reports (per speech therapist) pt has prism glasses at home and will bring them to pt)   Vision - Complex Assessment   Tracking Decreased smoothness of horizontal tracking;Decreased smoothness of vertical tracking;Decreased smoothness of eye movement to L superior field;Decreased smoothness of eye movement to L inferior field   Convergence Severe deficit (Greater than 10 from nose)   Visual Fields Difficulty detecting stimulus with left eye in left lateral quadrant;Difficulty detecting stimulus with left eye in left lower quadrant   Visual Screen Results Decreased visual acuity   Sensation   Light Touch No apparent deficits   Proprioception No apparent deficits   Perception   Initiation Cues to initiate tasks   Motor Planning   (delayed)   Perseveration Not present   Coordination   Movements are Fluid and Coordinated No   Upper Body Coordination rigidity noted   Lower Body Coordination rigidity noted   Trunk Coordination rigidity in trunk noted; posterior lean when in stance   Hand Function   Gross Grasp Impaired   Coordination Impaired   RUE    RUE  X   RUE AROM (degrees)   RUE AROM Comment impaired   R Shoulder Flexion  0-170 100 Degrees  (delayed, rigid movement)   RUE Strength    RUE Overall Strength Deficits   R Shoulder Flexion 3-/5   R Shoulder Extension 3-/5   R Elbow Flexion 4/5   R Elbow Extension 4/5   R Wrist Flexion 4/5   R Wrist Extension 4/5   LUE    LUE X   LUE AROM (degrees)   LUE AROM Comment impaired   L Shoulder Flexion  0-170 100 Degrees  (delayed, rigid movement)   LUE Strength   LUE Overall Strength Deficits   L Shoulder Flexion 3-/5   L Shoulder Extension 3-/5   L Elbow Flexion 4/5   L Elbow Extension 4/5   L Wrist Flexion 4/5   L Wrist Extension 4/5   IP OT Assessment   OT Assessment Pt is a 54 year old female with PMH of Parkinsons and supranuclear palsy who transferred to [facility] following 2 mechanical falls.  Pt fell backwards at home 10-26-24 resulting in an abrasion on scalp and forward on 10-27-24 resulting in a laceration to the L eyebrow. CT + for L frontal hematoma with no surgical intervention indicated at this time (per medical chart).  Pt presents with decreased balance, strength, coordination, and activity tolerance.  Pt has caregivers at home however is not performing at baseline.  Pt will benefit from skilled OT services to promote greater safety and independence with ADLs prior to transition home.   Prognosis Good   Barriers to Discharge None   Evaluation/Treatment Tolerance Patient tolerated treatment well   Medical Staff Made Aware Yes   End of Session Communication Bedside nurse   End of Session Patient Position   (tilt in space wc with physical therapist)   OT Assessment   OT Assessment Results Decreased ADL status;Decreased upper extremity range of motion;Decreased upper extremity strength;Decreased safe judgment during ADL;Decreased endurance;Visual deficit;Decreased fine motor control;Decreased functional mobility;Decreased gross motor control   Strengths Living arrangement secure;Support of Caregivers   Barriers to Participation Comorbidities   Education   Individual(s) Educated Patient   Education Provided Diagnosis & Precautions;Fall  precautons;Risk and benefits of OT discussed with patient or other;POC discussed and agreed upon   Risk and Benefits Discussed with Patient/Caregiver/Other yes   Patient/Caregiver Demonstrated Understanding yes   Plan of Care Discussed and Agreed Upon yes   Patient Response to Education Patient/Caregiver Verbalized Understanding of Information   Inpatient/Swing Bed or Outpatient   Inpatient/Swing Bed or Outpatient Inpatient   Inpatient Plan   Equipment Recommended upon Discharge   (wheelchair with head support, ramp at home entry, other DME TBD)   Treatment Interventions ADL retraining;Visual perceptual retraining;Functional transfer training;UE strengthening/ROM;Endurance training;Patient/family training;Equipment evaluation/education;Fine motor coordination activities;Compensatory technique education   OT Frequency 5 times per week   OT - OK to Discharge Yes   OT Discharge Recommendations High intensity level of continued care   OT Recommended Transfer Status Moderate assist;Assist of 1      11/05/24 0800   [facility] Daily Activity   Putting on and taking off regular lower body clothing 2   Bathing (including washing, rinsing, drying) 2   Putting on and taking off regular upper body clothing 2   Toileting, which includes using toilet, bedpan or urinal 2   Taking care of personal grooming such as brushing teeth 2   Eating Meals 2   Daily Activity - Total Score 12   Problem: ADLs LTM Goal  Goal: Patient will perform UB and LB bathing seated with contact guard assist level of assistance and grab bars and shower chair.  Outcome: Progressing  Goal: Patient with complete upper body dressing with minimal assist  level of assistance donning and doffing all UE clothes with no adaptive equipment while supported sitting.  Outcome: Progressing  Goal: Patient with complete lower body dressing with contact guard assist  level of assistance donning and doffing all LE clothes  with PRN adaptive equipment while supported  sitting.  Outcome: Progressing  Goal: Patient will feed self with supervision level of assistance and verbal cues using PRN adaptive equipment.  Outcome: Progressing  Goal: Patient will complete daily grooming tasks brushing teeth and washing face/hair with supervision level of assistance and PRN adaptive equipment while supported sitting.  Outcome: Progressing  Goal: Patient will complete toileting including clothing management/hygiene with contact guard assist  level of assistance and grab bars and bedside commode.  Outcome: Progressing     Problem: TRANSFERS LTM Goals  Goal: Patient will complete functional transfer to toilet with front wheeled walker with contact guard assist  level of assistance.  Outcome: Progressing  Goal: Patient will complete functional car transfer with front wheeled walker with contact guard assist level of assistance.  Outcome: Progressing

## 2024-11-05 NOTE — H&P
[facility]  Admission History and Physical    History of present illness    This is a 54-year-old woman progressive supranuclear palsy and advanced Parkinson's disease who was transferred to [facility] after 2 falls, falling backward, resulting with a scalp abrasion and laceration to left eyebrow.  She was subsequently found with a small left frontal convexity subdural hematoma up to 3 mm in thickness.  This was stable on repeat imaging, no surgical intervention was undertaken.  She did have a modified barium swallow and was diagnosed with oropharyngeal dysphagia and is on puréed solids and thin liquids at this time.  She says a few words.  She was up and participating with therapy today.  She provides little with a history, most of this is obtained by chart review.    Pre-admission functional status: Required assistance with most ADLs    Support System and Family Circumstances:  available to assist.    Past Medical History:   Diagnosis Date    Anxiety     Dysphagia     HTN (hypertension), benign     Parkinsons (Multi)     PSP (progressive supranuclear palsy) (Multi)         Past Surgical History:   Procedure Laterality Date    TUBAL LIGATION      TUBAL LIGATION          Family History   Family history unknown: Yes       Social History     Socioeconomic History    Marital status:      Spouse name: Not on file    Number of children: Not on file    Years of education: Not on file    Highest education level: Not on file   Occupational History    Not on file   Tobacco Use    Smoking status: Never    Smokeless tobacco: Never   Vaping Use    Vaping status: Never Used   Substance and Sexual Activity    Alcohol use: Never    Drug use: Never    Sexual activity: Never   Other Topics Concern    Not on file   Social History Narrative    Not on file     Social Drivers of Health     Financial Resource Strain: Low Risk  (11/5/2024)    Overall  Financial Resource Strain (CARDIA)     Difficulty of Paying Living Expenses: Not very hard   Food Insecurity: No Food Insecurity (11/5/2024)    Hunger Vital Sign     Worried About Running Out of Food in the Last Year: Never true     Ran Out of Food in the Last Year: Never true   Transportation Needs: No Transportation Needs (11/5/2024)    PRAPARE - Transportation     Lack of Transportation (Medical): No     Lack of Transportation (Non-Medical): No   Physical Activity: Unknown (11/5/2024)    Exercise Vital Sign     Days of Exercise per Week: 0 days     Minutes of Exercise per Session: Not on file   Stress: Patient Unable To Answer (11/4/2024)    [employer] - Occupational Stress Questionnaire     Feeling of Stress : Patient unable to answer   Social Connections: Patient Unable To Answer (11/4/2024)    Social Connection and Isolation Panel [NHANES]     Frequency of Communication with Friends and Family: Patient unable to answer     Frequency of Social Gatherings with Friends and Family: Patient unable to answer     Attends [employer]ology Services: Patient unable to answer     Active Member of Clubs or Organizations: Patient unable to answer     Attends Club or Organization Meetings: Patient unable to answer     Marital Status: Patient unable to answer   Intimate Partner Violence: Not At Risk (11/5/2024)    Humiliation, Afraid, Rape, and Kick questionnaire     Fear of Current or Ex-Partner: No     Emotionally Abused: No     Physically Abused: No     Sexually Abused: No   Housing Stability: Low Risk  (11/5/2024)    Housing Stability Vital Sign     Unable to Pay for Housing in the Last Year: No     Number of Times Moved in the Last Year: 0     Homeless in the Last Year: No       Review of Systems   Constitutional:  Negative for appetite change, chills and fever.   Respiratory:  Negative for cough and shortness of breath.    Cardiovascular:  Negative for chest pain.   Gastrointestinal:  Negative  "for abdominal pain, diarrhea, nausea and vomiting.   Neurological:  Negative for headaches.   All other systems reviewed and are negative.       Objective   /73 (BP Location: Right arm, Patient Position: Sitting)   Pulse 109   Temp 36.8 °C (98.2 °F) (Temporal)   Resp 17   Ht 1.676 m (5' 5.98\")   Wt 47.7 kg (105 lb 1.6 oz)   LMP  (LMP Unknown)   SpO2 99%   BMI 16.97 kg/m²     Physical Exam  Vitals reviewed.   Constitutional:       General: She is not in acute distress.     Appearance: Normal appearance. She is not toxic-appearing.   HENT:      Head: Normocephalic and atraumatic.      Mouth/Throat:      Mouth: Mucous membranes are moist.   Eyes:      Pupils: Pupils are equal, round, and reactive to light.   Cardiovascular:      Rate and Rhythm: Normal rate and regular rhythm.      Heart sounds: No murmur heard.  Pulmonary:      Breath sounds: Normal breath sounds. No wheezing, rhonchi or rales.   Abdominal:      General: There is no distension.      Palpations: Abdomen is soft.   Musculoskeletal:      Right lower leg: No edema.      Left lower leg: No edema.   Neurological:      Mental Status: She is alert.     She is rigid in all extremities.  She does not make eye contact but will track.  She says few words.    Recent Lab Results:  Results for orders placed or performed during the hospital encounter of 11/04/24 (from the past 24 hours)   CBC   Result Value Ref Range    WBC 7.1 4.4 - 11.3 x10*3/uL    nRBC 0.0 0.0 - 0.0 /100 WBCs    RBC 4.30 4.00 - 5.20 x10*6/uL    Hemoglobin 13.3 12.0 - 16.0 g/dL    Hematocrit 41.4 36.0 - 46.0 %    MCV 96 80 - 100 fL    MCH 30.9 26.0 - 34.0 pg    MCHC 32.1 32.0 - 36.0 g/dL    RDW 12.8 11.5 - 14.5 %    Platelets 281 150 - 450 x10*3/uL   Basic Metabolic Panel   Result Value Ref Range    Glucose 96 74 - 99 mg/dL    Sodium 140 136 - 145 mmol/L    Potassium 4.6 3.5 - 5.3 mmol/L    Chloride 104 98 - 107 mmol/L    Bicarbonate 28 21 - 32 mmol/L    Anion Gap 13 10 - 20 mmol/L    " Urea Nitrogen 33 (H) 6 - 23 mg/dL    Creatinine 0.57 0.50 - 1.05 mg/dL    eGFR >90 >60 mL/min/1.73m*2    Calcium 9.7 8.6 - 10.3 mg/dL        Imaging Results:  FL modified barium swallow study    Result Date: 10/29/2024  EXAMINATION: FL MODIFIED BARIUM SWALLOW 10/29/2024 01:20 PM CLINICAL HISTORY: assess swallowing ASSOCIATED DIAGNOSIS: ORDERING PROVIDER: GWEN ANN TECHNOLOGISTS NOTE: COMPARISON: None FLUOROSCOPIST:  AFSANEH CROSS    FLUORO TIME: 2.9 Minutes  TECHNIQUE: The examination was performed in conjunction with the Dysphagia/Speech Pathology Team. Various consistencies of contrast (thin barium, nectar thickened barium, pureed consistency of barium and barium on a cookie) were administered and deglutition was evaluated in the lateral projection utilizing video fluoroscopy. INTRA-PROCEDURE MEDS: barium Sulfate 40 % 5 oz Route: Oral barium Sulfate 40 % 5 oz Route: Oral Barium Sulfate 60 % 5 g Route: Oral FINDINGS: Oral phase: Normal. Pharyngeal phase: Delayed initiation of the pharyngeal phase of swallowing with the head of the contrast column at the level of valleculae. Elevation of the larynx and epiglottic inversion: Demonstrated. Laryngeal penetration, tracheal aspiration or nasopharyngeal reflux: Mild in-and-out laryngeal penetration of thin barium (5:530). No tracheal aspiration or nasopharyngeal reflux. Significant residual pooling of contrast within the valleculae or pyriform: Minimal contrast residuals in the valleculae and piriform sinuses. Ancillary findings: None. A complete report will also be performed by the Dysphagia/Speech Pathology Team. IMPRESSION: 1.  Mild in-and-out laryngeal penetration of thin barium. 2.  No tracheal aspiration. 3.  Delayed initiation of the pharyngeal phase of swallowing with the head of the contrast column at the level of valleculae. MACRO: None I have personally reviewed the images and agree with the resident's interpretation.    CT head wo IV contrast    Result  Date: 10/28/2024  EXAMINATION: CT HEAD W/O CONTRAST 10/28/2024 05:24 AM CLINICAL HISTORY: Subdural hematoma ASSOCIATED DIAGNOSIS: Subdural hematoma ORDERING PROVIDER: YADIRA SIMON TECHNOLOGISTS NOTE: COMPARISON: CT NEURO IMAGE IMPORT(ASHLEY) 10/27/2024, 8:45 PM TECHNIQUE: Thin axial imaging of the head was performed without intravenous contrast. FINDINGS: No mass or acute hemorrhage. No evidence of acute infarct. Slightly decreased left frontal subdural hematoma. Mild generalized brain parenchymal volume loss with commensurate ventricular caliber. Scattered patchy foci of white matter hypoattenuation, most likely mild chronic microvascular angiopathy. The skull, paranasal sinuses and tympanomastoid cavities are normal. Mild left periorbital preseptal soft tissue swelling. IMPRESSION: 1.  Slightly decreased left frontal subdural hematoma. MACRO: None I have personally reviewed the images and agree with the resident's interpretation.    CT maxillofacial bones wo IV contrast    Result Date: 10/27/2024  * * *Final Report* * * DATE OF EXAM: Oct 27 2024  9:12PM   ASC   0507  -  CT FACIAL BONE/ESTHER WO IVCON  / ACCESSION #  [id number] PROCEDURE REASON: Maxillofacial pain      * * * * Physician Interpretation * * * *  EXAMINATION:  CT FACIAL BONE/ESTHER WO IVCON, CT BRAIN WO IVCON, CT CERVICAL SPINE WO IVCON CLINICAL HISTORY:  Maxillofacial pain (accession [id number]), Head trauma, moderate-severe (accession [id number]), Neck pain, acute, no red flags (accession [id number]) TECHNIQUE:  Serial axial unenhanced images were obtained from the  vertex to the foramen magnum.  Spiral, high resolution axial unenhanced images were obtained from the skull base to the cervicothoracic junction with sagittal and coronal planar reconstructions.  Spiral high resolution axial unenhanced images were also obtained through the facial bones with sagittal and coronal planar reconstructions. Dose-Length Product (DLP): 314 (accession [id number]), 771  (accession [id number]), 159 (accession [id number]) mGy*cm. CT Dose Reduction Employed: Automated exposure control(AEC) and iterative recon COMPARISON:  None. RESULT: BRAIN: Post-operative change:  None. Acute change:   No evidence of an acute infarct or other acute parenchymal process. Hemorrhage:    Small left frontal convexity subdural hematoma measuring approximately 3 mm in thickness. Mass Lesion / Mass Effect:   There is no evidence of an intracranial mass or extraaxial fluid collection.  No significant mass effect. Chronic change:   None apparent. Parenchyma:  There is no significant volume loss. Ventricles:   The ventricles are within normal limits of size and configuration for age. FACIAL BONES: Soft Tissues:  Mild left periorbital soft tissue swelling.  Soft tissue laceration along the left eyebrow. Facial bones:  Age-indeterminate right nasal bone deformity, favor chronic given lack overlying soft tissue swelling. Orbits:  No evidence of an acute fracture.  The globes are intact.  The soft tissue planes of the orbits are maintained. Paranasal Sinuses:  The paranasal sinuses are clear. Foreign Bodies:  No evidence of radioopaque foreign bodies. Other:  No evidence of a remote fracture.  No lytic or blastic process seen in the facial bones. CERVICAL: Counting reference:  Craniocervical junction.   Anatomic Variants:  None. Alignment:    Alignment is anatomic. Craniocervical junction:    Craniocervical junction is normal. Osseous structures/fracture:    No evidence of a lytic or blastic process in the visualized spine.  No evidence of acute or chronic fracture. Cervical soft tissues:    The paraspinal soft tissues planes are maintained. Degenerative changes: Moderate degenerative changes greatest at C4-C5, C5-C6, and C6-C7.    IMPRESSION: Head CT: 1.  Small left frontal convexity subdural hematoma measuring up to 3 mm in thickness. Face CT: 1.  Age indeterminate right nasal bone deformity, favor chronic given  lack of overlying soft tissue swelling. 2.  Mild left periorbital soft tissue swelling with laceration along the left eyebrow. Cervical CT: 1.  No evidence of acute cervical spine fracture or traumatic subluxation. Anatomic Variant:  None.  Assume 7 cervical vertebrae with counting from the craniocervical junction. CRITICAL TEST/RESULTS: Acuity: Critical Finding: Acute intracranial hemorrhage Communication: Communicated with CELESTINO EMMANUELDICK on  10/27/2024 10:34 PM   via verbal communication. --END OF FINDING-- : KENDRA   Transcribe Date/Time: Oct 27 2024 10:16P Dictated by : CURTIS HILL MD This examination was interpreted and the report reviewed and electronically signed by: CURTIS HILL MD on Oct 27 2024 10:34PM  EST    CT cervical spine wo IV contrast    Result Date: 10/27/2024  * * *Final Report* * * DATE OF EXAM: Oct 27 2024  9:12PM   ASC   0505  -  CT CERVICAL SPINE WO IVCON  / ACCESSION #  [id number] PROCEDURE REASON: Neck pain, acute, no red flags      * * * * Physician Interpretation * * * *  EXAMINATION:  CT FACIAL BONE/ESTHER WO IVCON, CT BRAIN WO IVCON, CT CERVICAL SPINE WO IVCON CLINICAL HISTORY:  Maxillofacial pain (accession [id number]), Head trauma, moderate-severe (accession [id number]), Neck pain, acute, no red flags (accession [id number]) TECHNIQUE:  Serial axial unenhanced images were obtained from the  vertex to the foramen magnum.  Spiral, high resolution axial unenhanced images were obtained from the skull base to the cervicothoracic junction with sagittal and coronal planar reconstructions.  Spiral high resolution axial unenhanced images were also obtained through the facial bones with sagittal and coronal planar reconstructions. Dose-Length Product (DLP): 314 (accession [id number]), 771 (accession [id number]), 159 (accession [id number]) mGy*cm. CT Dose Reduction Employed: Automated exposure control(AEC) and iterative recon COMPARISON:  None. RESULT: BRAIN: Post-operative change:   None. Acute change:   No evidence of an acute infarct or other acute   parenchymal process. Hemorrhage:    Small left frontal convexity subdural hematoma measuring approximately 3 mm in thickness. Mass Lesion / Mass Effect:   There is no evidence of an intracranial mass or extraaxial fluid collection.  No significant mass effect. Chronic change:   None apparent. Parenchyma:  There is no significant volume loss. Ventricles:   The ventricles are within normal limits of size and configuration for age. FACIAL BONES: Soft Tissues:  Mild left periorbital soft tissue swelling.  Soft tissue laceration along the left eyebrow. Facial bones:  Age-indeterminate right nasal bone deformity, favor chronic given lack overlying soft tissue swelling. Orbits:  No evidence of an acute fracture.  The globes are intact.  The soft tissue planes of the orbits are maintained. Paranasal Sinuses:  The paranasal sinuses are clear. Foreign Bodies:  No evidence of radioopaque foreign bodies. Other:  No evidence of a remote fracture.  No lytic or blastic process seen in the facial bones. CERVICAL: Counting reference:  Craniocervical junction.   Anatomic Variants:  None. Alignment:    Alignment is anatomic. Craniocervical junction:    Craniocervical junction is normal. Osseous structures/fracture:    No evidence of a lytic or blastic process in the visualized spine.  No evidence of acute or chronic fracture. Cervical soft tissues:    The paraspinal soft tissues planes are maintained. Degenerative changes: Moderate degenerative changes greatest at C4-C5, C5-C6, and C6-C7.    IMPRESSION: Head CT: 1.  Small left frontal convexity subdural hematoma measuring up to 3 mm in thickness. Face CT: 1.  Age indeterminate right nasal bone deformity, favor chronic given lack of overlying soft tissue swelling. 2.  Mild left periorbital soft tissue swelling with laceration along the left eyebrow. Cervical CT: 1.  No evidence of acute cervical spine fracture or  traumatic subluxation. Anatomic Variant:  None.  Assume 7 cervical vertebrae with counting from the craniocervical junction. CRITICAL TEST/RESULTS: Acuity: Critical Finding: Acute intracranial hemorrhage Communication: Communicated with CELESTINO MEDINA on  10/27/2024 10:34 PM   via verbal communication. --END OF FINDING-- : KENDRA   Transcribe Date/Time: Oct 27 2024 10:16P Dictated by : CURTIS HILL MD This examination was interpreted and the report reviewed and electronically signed by: CURTIS HILL MD on Oct 27 2024 10:34PM  EST    CT head wo IV contrast    Result Date: 10/27/2024  * * *Final Report* * * DATE OF EXAM: Oct 27 2024  9:12PM   ASC   0504  -  CT BRAIN WO IVCON   / ACCESSION #  [id number] PROCEDURE REASON: Head trauma, moderate-severe      * * * * Physician Interpretation * * * *  EXAMINATION:  CT FACIAL BONE/ESTHER WO IVCON, CT BRAIN WO IVCON, CT CERVICAL SPINE WO IVCON CLINICAL HISTORY:  Maxillofacial pain (accession [id number]), Head trauma, moderate-severe (accession [id number]), Neck pain, acute, no red flags (accession [id number]) TECHNIQUE:  Serial axial unenhanced images were obtained from the  vertex to the foramen magnum.  Spiral, high resolution axial unenhanced images were obtained from the skull base to the cervicothoracic junction with sagittal and coronal planar reconstructions.  Spiral high resolution axial unenhanced images were also obtained through the facial bones with sagittal and coronal planar reconstructions. Dose-Length Product (DLP): 314 (accession [id number]), 771 (accession [id number]), 159 (accession [id number]) mGy*cm. CT Dose Reduction Employed: Automated exposure control(AEC) and iterative recon COMPARISON:  None. RESULT: BRAIN: Post-operative change:  None. Acute change:   No evidence of an acute infarct or other acute parenchymal process. Hemorrhage:    Small left frontal convexity subdural hematoma measuring approximately 3 mm in thickness. Mass Lesion / Mass  Effect:   There is no evidence of an intracranial mass or extraaxial fluid collection.  No significant mass effect. Chronic change:   None apparent. Parenchyma:  There is no significant volume loss. Ventricles:   The ventricles are within normal limits of size and configuration for age. FACIAL BONES: Soft Tissues:  Mild left periorbital soft tissue swelling.  Soft tissue laceration along the left eyebrow. Facial bones:  Age-indeterminate right nasal bone deformity, favor chronic given lack overlying soft tissue swelling. Orbits:  No evidence of an acute fracture.  The globes are intact.  The soft tissue planes of the orbits are maintained. Paranasal Sinuses:  The paranasal sinuses are clear. Foreign Bodies:  No evidence of radioopaque foreign bodies. Other:  No evidence of a remote fracture.  No lytic or blastic process seen in the facial bones. CERVICAL: Counting reference:  Craniocervical junction.   Anatomic Variants:  None. Alignment:    Alignment is anatomic. Craniocervical junction:    Craniocervical junction is normal. Osseous structures/fracture:    No evidence of a lytic or blastic process in the visualized spine.  No evidence of acute or chronic fracture. Cervical soft tissues:    The paraspinal soft tissues planes are maintained. Degenerative changes: Moderate degenerative changes greatest at C4-C5, C5-C6, and C6-C7.    IMPRESSION: Head CT: 1.  Small left frontal convexity subdural hematoma measuring up to 3 mm in thickness. Face CT: 1.  Age indeterminate right nasal bone deformity, favor chronic given lack of overlying soft tissue swelling. 2.  Mild left periorbital soft tissue swelling with laceration along the left eyebrow. Cervical CT: 1.  No evidence of acute cervical spine fracture or traumatic subluxation. Anatomic Variant:  None.  Assume 7 cervical vertebrae with counting from the craniocervical junction. CRITICAL TEST/RESULTS: Acuity: Critical Finding: Acute intracranial hemorrhage Communication:  Communicated with CELESTINO MEDINA on  10/27/2024 10:34 PM   via verbal communication. --END OF FINDING-- : KENDRA   Transcribe Date/Time: Oct 27 2024 10:16P Dictated by : CURTIS HILL MD This examination was interpreted and the report reviewed and electronically signed by: CURTIS HILL MD on Oct 27 2024 10:34PM  EST    CT abdomen pelvis w IV contrast    Result Date: 10/8/2024  * * *Final Report* * * DATE OF EXAM: Oct  7 2024 11:00PM   ASC   0530  -  CT ABD/PEL W IVCON  / ACCESSION #  [id number] PROCEDURE REASON: Rectal bleeding      * * * * Physician Interpretation * * * *  EXAMINATION:  CT ABDOMEN AND PELVIS WITH IV CONTRAST CLINICAL HISTORY: Rectal bleeding beginning tonight. TECHNIQUE: CT of the abdomen and pelvis was performed using standard technique, scanning from just above the dome of the diaphragm to the symphysis pubis. MQ:  CTAP_3 Contrast: IV:  100 ml of Omnipaque 350 CT Radiation dose: Integrated Dose-length product (DLP) for this visit =   263 mGy*cm. CT Dose Reduction Employed: Automated exposure control(AEC) and iterative recon COMPARISON: 09/13/2024 RESULT: Liver: No mass. Biliary: No bile duct dilation.  Gallbladder is unremarkable. Spleen: No mass. No splenomegaly. Pancreas: No mass or duct dilation. Adrenals: No mass. Kidneys: No mass, calculus or hydronephrosis. GI tract: No dilation or wall thickening. No appreciable hemorrhage into the lumen of the bowel within the limitations of this single phase venous exam.  Large rectal stool burden. Lymph nodes: No abdominal or pelvic lymphadenopathy. Mesentery/Peritoneum: No ascites or mass. Retroperitoneum: No mass. Vasculature: Unremarkable Pelvis: No mass, ascites or fluid collection. Prior hysterectomy. Bones/Soft Tissues: Thin elongated fluid collection in the RIGHT gluteus lucia seen on series 2 image 109 and series 601 image 55 measuring 1.9 x 1.5 cm transaxially and up to 7.0 cm craniocaudad. Lower thorax: Unremarkable.  Localizer images: No additional findings.    IMPRESSION: 1.  No appreciable active hemorrhage into the lumen of the bowel within the limitations of a single venous phase exam. 2.  Large rectal stool burden. 3.  Elongated fluid collection in the RIGHT gluteus lucia.   Intramuscular extension of bursitis versus intramuscular fluid from muscle injury is favored, but developing intramuscular abscess could appear similar and correlation for any symptoms of infection is recommended. : [facility]   Transcribe Date/Time: Oct  8 2024 12:40A Dictated by : BRIDGET DEL RIO MD This examination was interpreted and the report reviewed and electronically signed by: BRIDGET DEL RIO MD on Oct  8 2024 12:46AM  EST       Medications:  Scheduled medications  escitalopram, 10 mg, oral, Daily  glycopyrrolate, 1 mg, oral, Daily  lisinopril, 10 mg, oral, Daily  nystatin, 5 mL, Swish & Swallow, TID  rOPINIRole, 0.5 mg, oral, Nightly      Continuous medications     PRN medications  PRN medications: acetaminophen **OR** acetaminophen, alum-mag hydroxide-simeth, bisacodyl, polyethylene glycol    Assessment/Plan   Admitted for comprehensive inpatient rehabilitation including PT, OT, RT, SLP, and supportive services to improve functional outcome of impaired mobility, ADLs, transfers, and self-care.     Problem   Sdh (Subdural Hematoma) (Multi)   Parkinson's Disease With Dyskinesia and Fluctuating Manifestations   Progressive Supranuclear Palsy (Multi)      Subdural hematoma treated conservatively.  Follow-up with the surgeon as requested.  We need to clarify the home medications.  She is on the ropinirole at nighttime but I believe she was on Nuedexta at home.  I spoke with the nurse who says she will discuss with her  when he comes in this evening.    Hypertension, to continue the lisinopril.    Anxiety, depression.  Has been on the SSRI, continue.    Progressive supranuclear palsy, Parkinson's disease.    Rehab Plan of Care  :  The Interdisciplinary Team will work collaboratively to address the patient problems and goals to be managed by the Individualized Interdisciplinary Plan of Care. See the IPOC note for a complete review of the plan of care.    Medical Necessity:  This patient requires, and is capable of participating in, an intensive and coordinated interdisciplinary acute inpatient rehabilitation program. He requires close rehabilitation physician monitoring and management to monitor his complex medical conditions and coordinate rehabilitation care. The patient's rehabilitation goals and medical complexity cannot adequately be managed in a less intensive setting. Potential risks for clinical complications include: Altered mental status, falls..    Rehabilitation Potential: Panfilo Patterson MD

## 2024-11-05 NOTE — NURSING NOTE
"Pt arrived at 2145 by ambulance; admitted into room 504. Pt A&O x2. Follows commands. Answers questions with a \"thumbs up/thumbs down\". Pt took pill whole in pudding w/ some difficulty swallowing. Pt has bruising on BLE and a laceration on left eyebrow. Sacral border applied today for protection; pt is very thin; all other bony prominences intact. Pt's vision intact but appears to have difficulty focusing; will turn head in direction of voice but never makes direct eye contact. Bed alarms are on; side rails up; fall, name and allergy bans applied. Call light w/in reach; pt is able to ring appropriately. Will update as needed.  "

## 2024-11-05 NOTE — PROGRESS NOTES
"Speech-Language Pathology    SLP Adult IRF [facility] Speech-Language Cognition Evaluation    Patient Name: Leidy Teixeira  MRN: [id number]  Today's Date: 11/5/2024   Time Calculation  Start Time: 1000  Stop Time: 1015  Time Calculation (min): 15 min     1/4sp    Current Problem:   SDH with PMH significant for HTN, anxiety, dysphagia, Parkinson's, progressive supranuclear palsy, dystonia     SLP Assessment:  SLP Assessment  Medical Staff Made Aware: Yes  Strengths: Family/Caregiver Support  Barriers: Comorbidities    At this time, pt is nonverbal. She is able to accurately respond to questions with a thumbs up/down. Given extra time, she is able to follow simple auditory directives.     reports that pt is usually conversational when not fatigued. If the patient is fatigued/tired, communication becomes more \"garbled\" and pt can become nonverbal. Pt has prism eyeglasses, has difficulty seeing below her nose level.  will bring in prism eyeglasses.     Although pt may be near a baseline communication level,  is asking if there are any options for communication when pt becomes nonverbal. Will attempt to trials low tech communication boards and SGD, to see if pt may benefit.  is in agreement.    SLP Plan:  Plan  Inpatient/Swing Bed or Outpatient: Inpatient  Treatment/Interventions: Expressive Language  SLP Plan: Skilled SLP  SLP Frequency: 4x per week  Duration: 2 weeks  SLP Discharge Recommendations: Other (Comment) (TBD pending progress)  Next Treatment Priority: diet tolerance  Discussed POC: Patient, Caregiver/family, Nursing  Discussed Risks/Benefits: Yes, Patient, Caregiver/Family, Nursing  Patient/Caregiver Agreeable: Yes    SPEECH-LANGUAGE GOALS (established 11/5, anticipate end 2 weeks):  Pt will accurately locate 4 icons on SGD.  2. Pt will utilize communication board/icons to express at least 1 want/need.  3. Ongoing assessment if indicated for further goal development or to determine " progress, as needed.  LTG: Pt will improve expressive communication for basic needs/wants.    Subjective   Pt seen upright in wheelchair. Identity confirmed via pt wristband.     General Visit Information:  General Information  Chart Reviewed: Yes  Caregiver Feedback:   Reason for Referral: Pt admitted on modified diet. MBSS completed at [facility]. Pt has h/o Parkinson's and PSP, had 2 mechanical falls at home, now with SDH.  Referred By: Dr. Patterson  Past Medical History Relevant to Rehab: HTN, anxiety, dysphagia, Parkinson's, progressive supranuclear palsy, dystona  Prior to Session Communication: Bedside nurse      Objective       Pain:  Pain Assessment  0-10 (Numeric) Pain Score: 0 - No pain      Cognition:  Cognition  Arousal/Alertness: Delayed responses to stimuli  Following Commands: Follows one step commands with increased time  Cognition Comments: Pt appears fatigued at this time. Per , communication decreases with fatigue.  Attention: Within Functional Limits    Auditory Comprehension:   Auditory Comprehension  Yes/No Questions: Within Functional Limits    Reading Comprehension:  Reading Comprehension  Reading Status:  (Per , he will bring in prism glasses. Pt has difficulty seeing below nose.)    Verbal:  Verbal Expression  Primary Mode of Expression: Other (Comment) (At this time, pt is nonverbal. Uses thumbs up/down)  Primary Language: English    Inpatient Education:  Adult Inpatient Education  Individual(s) Educated: Patient, Spouse  Verbal Education : pureed/thin textures, controlled sip drinks  Risk and Benefits Discussed with Patient/Caregiver/Other: yes  Patient/Caregiver Demonstrated Understanding: yes  Plan of Care Discussed and Agreed Upon: yes  Patient Response to Education: Patient/Caregiver Verbalized Understanding of Information  Education Comment: Discussed with     Consultations/Referrals/Coordination of Services: Discussed with RN that pt needs to schedule follow-up  for Botox injection due to h/o dystonia

## 2024-11-05 NOTE — INDIVIDUALIZED OVERALL PLAN OF CARE NOTE
Physical Medicine and Rehabilitation  Individualized Overall Plan of Care    Patient Name: Leidy Teixeira  Medical Record Number: [id number]  YOB: 1970  Sex: Female  Payor Info: Payor: [employer] / Plan: [employer]ET[employer]  HEALTHCARE / Product Type: *No Product type* /     Primary Rehab (Etiologic) Diagnosis: SDH (subdural hematoma) (Multi)   IGC: Brain Dysfunction: 02.22 Traumatic, Closed Injury    Estimated Length of Stay: 14 days    Medical functional prognosis is good for the patient to be discharged home at minimal assist } with higher level assist for higher level ADL's.    Patient/Family anticipated outcome: Home as independent as possible.    Functional Outcome/Goal:  Bed mobility: minimal assist   Transfer sit to stand: minimal assist   Ambulation with: minimal assist   Upper extremity dressing: contact guard assist  Lower extremity dressing: moderate assist  Stairs: moderate assist  Communicate needs and wants as independent as able  Tolerate least restrictive diet independently     Anticipated Interventions:  Therapeutic exercises  Gait Training   Transfer training  Balance and high-level mobility training  Exercises to improve balance and mobility  ADL retraining for grooming, bathing, ADL activities  Exercises to improve strength and endurance  Cognitive skills and training  Speech and language training  High level executive functioning training  Swallow advancement and training    Required Therapy:  Physical therapy 90 minutes 5 days/week for 14 days.  Occupational therapy 90 minutes 5 days/week for 14 days.  Speech therapy 45 minutes 5 days/week for 14 days.    Patient has Fair Rehab potential.    Assessment/Plan   Assessment & Plan  SDH (subdural hematoma) (Multi)    Parkinson's disease with dyskinesia and fluctuating manifestations    Progressive supranuclear palsy (Multi)             Chaim Patterson MD  11/5/2024 1:23 PM

## 2024-11-05 NOTE — CARE PLAN
Problem: IRF PT LTG Problem  Goal: Patient will roll right and left with independent assist to facilitate mobility.  Outcome: Progressing  Goal: Patient will transfer supine to sit and sit to supine with supervision assist to facilitate mobility.  Outcome: Progressing  Goal: Patient will transfer sit to stand and stand to sit with CGA assist to facilitate mobility.  Outcome: Progressing  Goal: Patient will transfer bed to chair and chair to bed with contact guard assist to facilitate mobility.  Outcome: Progressing  Goal: Patient will amb 100 feet least restrictive device including two turns on even surface with minimal assist to facilitate safe mobility.    Outcome: Progressing  Goal: Patient will propel wheelchair 150 feet with two turns on even surface with independent assist to facilitate safe mobility.   Outcome: Progressing  Goal: Patient will perform TUG with least restrictive assistive device in 2 minutes or less to promote mobility and reduce fall risk with dynamic standing tasks.   Outcome: Progressing  Goal: Patient will improve FIST score to  40/56  to promote mobility and safety with seated activities.   Outcome: Progressing  Goal: Patient will increase BLE strength to 4+/5 to improve functional mobility.   Outcome: Progressing  Goal: Patient will reduce knee flexion contracture by 10 degrees  Outcome: Progressing

## 2024-11-05 NOTE — PROGRESS NOTES
Speech-Language Pathology    SLP Adult IRF [facility] Clinical Bedside Swallow Evaluation    Patient Name: Leidy Teixeira  MRN: [id number]  Today's Date: 11/5/2024   Time Calculation  Start Time: 1015  Stop Time: 1030  Time Calculation (min): 15 min     1/4sw    Current Problem:   SDH with PMH significant for HTN, anxiety, dysphagia, Parkinson's, progressive supranuclear palsy, dystonia     Per 10/29 MBSS: Oral phase: Normal.   Pharyngeal phase: Delayed initiation of the pharyngeal phase of swallowing with the head of the contrast column at the level of valleculae.   Elevation of the larynx and epiglottic inversion: Demonstrated.   Laryngeal penetration, tracheal aspiration or nasopharyngeal reflux: Mild in-and-out laryngeal penetration of thin barium (5:530). No tracheal aspiration or nasopharyngeal reflux.   Significant residual pooling of contrast within the valleculae or pyriform: Minimal contrast residuals in the valleculae and piriform sinuses.   Ancillary findings: None.     A complete report will also be performed by the Dysphagia/Speech Pathology Team.     IMPRESSION:   1. Mild in-and-out laryngeal penetration of thin barium.   2.  No tracheal aspiration.   3.  Delayed initiation of the pharyngeal phase of swallowing with the head of the contrast column at the level of valleculae.     Recommendations:  Solid Diet Recommendations : Pureed (IDDSI Level 4)  Liquid Diet Recommendations: Thin (IDDSI Level 0)  Compensatory Swallowing Strategies: Upright 90 degrees as possible for all oral intake, Single sips, Small bites/sips, Eat/feed slowly, One to one assist with meals  Medication Administration Recommendations: Crushed, With Pureed    Assessment:  Medical Staff Made Aware: Yes  Strengths: Family/Caregiver Support  Barriers: Comorbidities  Clinical Swallow Evaluation completed consisting of patient/caregiver interview, oral motor assessment, and analysis of swallow abilities with PO trials (sips of water via straw,  "small 1/2tsp applesauce)     ORAL PHASE:   Natural dentition with missing upper tooth. Lingual surface appears thickly coated white. However, pt had difficulty fully opening mouth. Limited assessment. Overall slow oral-motor movements. SLP fed pt, held straw to control sips. SLP held spoon to control pureed bolus. Oral holding with all trials. No oral residues noted.     PHARYNGEAL PHASE:   Laryngeal movement was visualized with all trials, appeared reduced. However adequacy of hyolaryngeal elevation/excursion cannot be determined at bedside. No overt (immediate or delayed) s/s aspiration were demonstrated with any consistencies.  Pt was nonverbal for evaluation.    Plan:  Inpatient/Swing Bed or Outpatient: Inpatient  Treatment/Interventions: Assess diet tolerance, Patient/family education  SLP Plan: Skilled SLP  SLP Frequency: 4x per week  Duration: 2 weeks  SLP Discharge Recommendations: Other (Comment) (TBD pending progress)  Diet Recommendations: Solid, Liquid  Solid Consistency: Pureed/extremely thick (IDDSI Level 4)  Liquid Consistency: Thin (IDDSI Level 0)  Next Treatment Priority: diet tolerance  Discussed POC: Patient, Caregiver/family, Nursing  Discussed Risks/Benefits: Yes, Patient, Caregiver/Family, Nursing  Patient/Caregiver Agreeable: Yes    DYSPHAGIA GOALS (established 11/5 , anticipated end 2 weeks):  1) Pt to tolerate recommended diet to 90% without overt s/s of aspiration in order to ensure pt is demonstrating adequate swallow function for meeting nutrition and hydration needs.   PROGRESS:  reports that baseline diet at home is pureed/thin. Pt has h/o recent weight loss which  indicates \"needs to get thyroid checked.\"   STATUS: continue pureed, thin  2) Pt to demonstrate functional knowledge and use of compensatory swallowing strategies to 90% to reduce risk of aspiration and s/s dysphagia.  PROGRESS:  will bring in dysphagia cup to control cup/sip drinks. Pt had choking " episode at hospital. Possibly due to fast rate per .   STATUS: continue controlled sip drinks.    LONG TERM GOAL: Patient will tolerate the least restrictive diet without overt difficulty or pulmonary compromise by time of discharge.      Subjective   Pt seen upright in wheelchair. Identity confirmed via pt wristband.    General Visit Information:  Patient Class: IRF  Caregiver Feedback:   Ordering Physician: Dr. Patterson  Reason for Referral: Pt admitted on modified diet. MBSS completed at [facility]. Pt has h/o Parkinson's and PSP, had 2 mechanical falls at home, now with SDH.  Referred By: Dr. Patterson  Past Medical History Relevant to Rehab: HTN, anxiety, dysphagia, Parkinson's, progressive supranuclear palsy, dystona  Prior to Session Communication: Bedside nurse  [employer] Diet: pureed/thin  Current Diet : pureed/thin  Dysphagia Diagnosis: Other (Comment) (moderate oropharyngeal dysphagia per 10/29 MBSS)    Objective     Baseline Assessment:  Temperature Spikes Noted: No  Respiratory Status: Room air  Behavior/Cognition: Alert, Cooperative, Other (comment) (fatigued after PT session)  Hearing: Within Functional Limits  Patient Positioning: Upright in Chair  Baseline Vocal Quality: Other (comment) (nonverbal at this time)  Volitional Cough: Other (Comment) (not observed)  Volitional Swallow: Delayed      Pain:  Pain Assessment  0-10 (Numeric) Pain Score: 0 - No pain    Oral/Motor Assessment:  Oral Hygiene: lingual surface appears thickly coated white and yellow  Dentition: Other (Comment) (poor natural dentition, 1 missing upper tooth)  Oral Motor: Impaired Function  Labial Agility: Reduced  Labial ROM: Reduced right, Reduced left  Labial Strength: Reduced  Lingual Agility: Reduced  Lingual ROM: Reduced right, Reduced left  Lingual Strength: Reduced  Vocal Quality: Exceptions to WFL  Intelligibility: Unable to assess  Hearing: Within Functional Limits    Consistencies Trialed:  Consistencies Trialed:  Yes  Consistencies Trialed: Thin (IDDSI Level 0) - Straw, Pureed/extremely thick (IDDSI Level 4)    Clinical Observations:  Patient Positioning: Upright in Chair  Management of Oral Secretions: Adequate  Other Signs/Symptoms of Difficulty with Feeding: Oral Holding  Reduced Laryngeal Movement Upon Palpation: Thin (IDDSI Level 0) - Straw, Pureed/Extremely Thick (IDDSI Level 4)    Inpatient Education:  Adult Inpatient Education  Individual(s) Educated: Patient, Spouse  Verbal Education : pureed/thin textures, controlled sip drinks  Risk and Benefits Discussed with Patient/Caregiver/Other: yes  Patient/Caregiver Demonstrated Understanding: yes  Plan of Care Discussed and Agreed Upon: yes  Patient Response to Education: Patient/Caregiver Verbalized Understanding of Information  Education Comment: Discussed with     Consultations/Referrals/Coordination of Services: Discussed results and recommendations with RN. Per , pt needs follow-up for Botox injections (dystonia).

## 2024-11-05 NOTE — PROGRESS NOTES
Physical Therapy       11/05/24 [id number]   PT  Visit   PT Received On 11/05/24   Response to Previous Treatment Patient reporting fatigue but able to participate.   General   Reason for Referral decreased mobility, SDH   Referred By Dr. Patterson   Past Medical History Relevant to Rehab HTN, anxiety, dysphagia, Parkinson's, progressive supranuclear palsy, dystona   Patient Position Received Up in chair  (tilt in space wheelchair)   Precautions   Medical Precautions Fall precautions;Swallowing precautions   Precautions Comment puree diet and thin liquids   Pain Assessment   Pain Assessment 0-10   0-10 (Numeric) Pain Score 1   Pain Location Hip   Pain Orientation Left   Pain Interventions   (MD notified)   Therapeutic Exercise   Therapeutic Exercise Activity 1 LAQ 2 x 15 with gentle stretch at end range 5 reps   Therapeutic Exercise Activity 2 seated hip flex 2 x 15   Therapeutic Exercise Activity 3 patient seated in wheelchiar with large swiss ball in front of her, rolling ball forward/backward and to left and right x 10 reps each direction CGA/min assist due to rigidity of trunk   Ambulation/Gait Training 1   Surface 1 Level tile   Gait Support Devices Gait belt;Wheelchair follow   Assistance 1 Minimum assistance;Moderate assistance   Quality of Gait 1 NBOS;Inconsistent stride length   Comments/Distance (ft) 1 30' with ustep walker, cues to increase MARTINA, for upright posture due to posterior lean. Tends to amb on toes. Cues for foot flat. Fatigues quickly. Assist to slow forward movement of walker and for added stability.   Transfer 1   Technique 1 Sit to stand;Stand to sit   Transfer Level of Assistance 1 Minimum assistance;Moderate assistance   Trials/Comments 1 step by step cues . Rigid trunk with posterior lean. Cues for trunk flexion and COG over MARTINA   PT Assessment   PT Assessment Results Decreased strength;Decreased range of motion;Decreased endurance;Impaired balance;Decreased mobility;Decreased  coordination;Impaired vision;Impaired tone   Rehab Prognosis Good  (for goals set)   Evaluation/Treatment Tolerance Patient limited by fatigue   End of Session Communication Bedside nurse   End of Session Patient Position   (tilt in space wheelchair, reclined position.)   PT Plan   Inpatient/Swing Bed or Outpatient Inpatient   PT Plan   Treatment/Interventions Transfer training;Gait training;Strengthening;Range of motion;Therapeutic exercise;Therapeutic activity   PT Recommended Transfer Status Assist x1   PT - OK to Discharge Yes

## 2024-11-06 ENCOUNTER — HOSPITAL ENCOUNTER (EMERGENCY)
Facility: HOSPITAL | Age: 54
Discharge: HOME | End: 2024-11-06
Payer: COMMERCIAL

## 2024-11-06 ENCOUNTER — APPOINTMENT (OUTPATIENT)
Dept: RADIOLOGY | Facility: HOSPITAL | Age: 54
End: 2024-11-06
Payer: COMMERCIAL

## 2024-11-06 VITALS
HEART RATE: 83 BPM | DIASTOLIC BLOOD PRESSURE: 74 MMHG | RESPIRATION RATE: 18 BRPM | OXYGEN SATURATION: 97 % | BODY MASS INDEX: 16.71 KG/M2 | TEMPERATURE: 98.1 F | HEIGHT: 65 IN | WEIGHT: 100.3 LBS | SYSTOLIC BLOOD PRESSURE: 101 MMHG

## 2024-11-06 DIAGNOSIS — Z13.9 ENCOUNTER FOR MEDICAL SCREENING EXAMINATION: Primary | ICD-10-CM

## 2024-11-06 DIAGNOSIS — Z86.69 HISTORY OF PROGRESSIVE SUPRANUCLEAR PALSY: ICD-10-CM

## 2024-11-06 LAB
ALBUMIN SERPL BCP-MCNC: 4.3 G/DL (ref 3.4–5)
ALP SERPL-CCNC: 74 U/L (ref 33–110)
ALT SERPL W P-5'-P-CCNC: 20 U/L (ref 7–45)
ANION GAP SERPL CALCULATED.3IONS-SCNC: 12 MMOL/L (ref 10–20)
AST SERPL W P-5'-P-CCNC: 15 U/L (ref 9–39)
BASOPHILS # BLD AUTO: 0.03 X10*3/UL (ref 0–0.1)
BASOPHILS NFR BLD AUTO: 0.4 %
BILIRUB SERPL-MCNC: 0.8 MG/DL (ref 0–1.2)
BUN SERPL-MCNC: 32 MG/DL (ref 6–23)
CALCIUM SERPL-MCNC: 9.8 MG/DL (ref 8.6–10.3)
CHLORIDE SERPL-SCNC: 105 MMOL/L (ref 98–107)
CO2 SERPL-SCNC: 25 MMOL/L (ref 21–32)
CREAT SERPL-MCNC: 0.46 MG/DL (ref 0.5–1.05)
EGFRCR SERPLBLD CKD-EPI 2021: >90 ML/MIN/1.73M*2
EOSINOPHIL # BLD AUTO: 0.04 X10*3/UL (ref 0–0.7)
EOSINOPHIL NFR BLD AUTO: 0.5 %
ERYTHROCYTE [DISTWIDTH] IN BLOOD BY AUTOMATED COUNT: 12.7 % (ref 11.5–14.5)
GLUCOSE SERPL-MCNC: 100 MG/DL (ref 74–99)
HCT VFR BLD AUTO: 40.7 % (ref 36–46)
HGB BLD-MCNC: 13.3 G/DL (ref 12–16)
IMM GRANULOCYTES # BLD AUTO: 0.02 X10*3/UL (ref 0–0.7)
IMM GRANULOCYTES NFR BLD AUTO: 0.2 % (ref 0–0.9)
LYMPHOCYTES # BLD AUTO: 1.22 X10*3/UL (ref 1.2–4.8)
LYMPHOCYTES NFR BLD AUTO: 14.8 %
MCH RBC QN AUTO: 31.4 PG (ref 26–34)
MCHC RBC AUTO-ENTMCNC: 32.7 G/DL (ref 32–36)
MCV RBC AUTO: 96 FL (ref 80–100)
MONOCYTES # BLD AUTO: 0.5 X10*3/UL (ref 0.1–1)
MONOCYTES NFR BLD AUTO: 6.1 %
NEUTROPHILS # BLD AUTO: 6.45 X10*3/UL (ref 1.2–7.7)
NEUTROPHILS NFR BLD AUTO: 78 %
NRBC BLD-RTO: 0 /100 WBCS (ref 0–0)
PLATELET # BLD AUTO: 269 X10*3/UL (ref 150–450)
POTASSIUM SERPL-SCNC: 4.1 MMOL/L (ref 3.5–5.3)
PROT SERPL-MCNC: 7.2 G/DL (ref 6.4–8.2)
RBC # BLD AUTO: 4.24 X10*6/UL (ref 4–5.2)
SODIUM SERPL-SCNC: 138 MMOL/L (ref 136–145)
WBC # BLD AUTO: 8.3 X10*3/UL (ref 4.4–11.3)

## 2024-11-06 PROCEDURE — 70450 CT HEAD/BRAIN W/O DYE: CPT | Performed by: RADIOLOGY

## 2024-11-06 PROCEDURE — 99284 EMERGENCY DEPT VISIT MOD MDM: CPT | Mod: 25

## 2024-11-06 PROCEDURE — 36415 COLL VENOUS BLD VENIPUNCTURE: CPT

## 2024-11-06 PROCEDURE — 97535 SELF CARE MNGMENT TRAINING: CPT | Mod: GO,CO

## 2024-11-06 PROCEDURE — 97116 GAIT TRAINING THERAPY: CPT | Mod: GP

## 2024-11-06 PROCEDURE — 97530 THERAPEUTIC ACTIVITIES: CPT | Mod: GO,CO

## 2024-11-06 PROCEDURE — 1180000001 HC REHAB PRIVATE ROOM DAILY

## 2024-11-06 PROCEDURE — 85025 COMPLETE CBC W/AUTO DIFF WBC: CPT

## 2024-11-06 PROCEDURE — 71046 X-RAY EXAM CHEST 2 VIEWS: CPT

## 2024-11-06 PROCEDURE — 92526 ORAL FUNCTION THERAPY: CPT | Mod: GN

## 2024-11-06 PROCEDURE — 97110 THERAPEUTIC EXERCISES: CPT | Mod: GP

## 2024-11-06 PROCEDURE — 97530 THERAPEUTIC ACTIVITIES: CPT | Mod: GP

## 2024-11-06 PROCEDURE — 2500000001 HC RX 250 WO HCPCS SELF ADMINISTERED DRUGS (ALT 637 FOR MEDICARE OP): Performed by: INTERNAL MEDICINE

## 2024-11-06 PROCEDURE — 80053 COMPREHEN METABOLIC PANEL: CPT

## 2024-11-06 PROCEDURE — 92507 TX SP LANG VOICE COMM INDIV: CPT | Mod: GN

## 2024-11-06 PROCEDURE — 70450 CT HEAD/BRAIN W/O DYE: CPT

## 2024-11-06 ASSESSMENT — PAIN - FUNCTIONAL ASSESSMENT
PAIN_FUNCTIONAL_ASSESSMENT: 0-10
PAIN_FUNCTIONAL_ASSESSMENT: FLACC (FACE, LEGS, ACTIVITY, CRY, CONSOLABILITY)
PAIN_FUNCTIONAL_ASSESSMENT: 0-10
PAIN_FUNCTIONAL_ASSESSMENT: 0-10
PAIN_FUNCTIONAL_ASSESSMENT: FLACC (FACE, LEGS, ACTIVITY, CRY, CONSOLABILITY)
PAIN_FUNCTIONAL_ASSESSMENT: 0-10
PAIN_FUNCTIONAL_ASSESSMENT: 0-10

## 2024-11-06 ASSESSMENT — PAIN SCALES - GENERAL
PAINLEVEL_OUTOF10: 0 - NO PAIN
PAINLEVEL_OUTOF10: 1
PAINLEVEL_OUTOF10: 2
PAINLEVEL_OUTOF10: 5 - MODERATE PAIN
PAINLEVEL_OUTOF10: 0 - NO PAIN

## 2024-11-06 ASSESSMENT — ACTIVITIES OF DAILY LIVING (ADL)
HOME_MANAGEMENT_TIME_ENTRY: 15
BATHING_WHERE_ASSESSED: BED LEVEL
BATHING_LEVEL_OF_ASSISTANCE: MODERATE ASSISTANCE
HOME_MANAGEMENT_TIME_ENTRY: 60

## 2024-11-06 ASSESSMENT — PAIN DESCRIPTION - DESCRIPTORS: DESCRIPTORS: PATIENT UNABLE TO DESCRIBE

## 2024-11-06 ASSESSMENT — PAIN DESCRIPTION - LOCATION: LOCATION: COCCYX

## 2024-11-06 NOTE — CARE PLAN
The patient's goals for the shift include  sleep    The clinical goals for the shift include saftey awareness; promote sleep

## 2024-11-06 NOTE — PROGRESS NOTES
Speech-Language Pathology    SLP Adult IRF [facility] Dysphagia and Speech-Language Pathology Treatment     Patient Name: Leidy Teixeira  MRN: [id number]  Today's Date: 11/6/2024  Time Calculation  Start Time: 0805  Stop Time: 0855  Time Calculation (min): 50 min     2/4sw +sp    Current Problem:   SDH with PMH significant for HTN, anxiety, dysphagia, Parkinson's, progressive supranuclear palsy, dystonia     SLP Assessment:  SLP TX Intervention Outcome: Making Progress Towards Goals  SLP Assessment Results: Other (Comment), Expression deficits (dysphagia)  Prognosis: Good  Treatment Tolerance: Patient tolerated treatment well  Medical Staff Made Aware: Yes  Strengths: Family/Caregiver Support, Motivation  Barriers: Comorbidities  Education Provided: Yes     Plan:  Inpatient/Swing Bed or Outpatient: Inpatient  Treatment/Interventions: Expressive Language, Other (Comment) (dysphagia)  SLP TX Plan: Continue Plan of Care  SLP Plan: Skilled SLP  SLP Frequency: 4x per week  Duration: 2 weeks  SLP Discharge Recommendations: Other (Comment) (TBD pending progress)  Next Treatment Priority: diet tolerance, AAC  Discussed POC: Patient, Nursing  Discussed Risks/Benefits: Yes, Patient, Nursing  Patient/Caregiver Agreeable: Yes      Subjective   Pt seen upright in bed. +Nystatin treatment.      General Visit Information:   Reason for Referral: Follow-up dysphagia and speech-language treatment  Referred By: Dr. Patterson  Past Medical History Relevant to Rehab: HTN, anxiety, dysphagia, Parkinson's, progressive supranuclear palsy, dystona  Caregiver Feedback:   Prior to Session Communication: Bedside nurse      Pain Assessment:  Pain Assessment  0-10 (Numeric) Pain Score: 0 - No pain      Objective   DYSPHAGIA GOALS (established 11/5 , anticipated end 2 weeks):  1) Pt to tolerate recommended diet to 90% without overt s/s of aspiration in order to ensure pt is demonstrating adequate swallow function for meeting nutrition and hydration  needs.   PROGRESS: RN indicates that pureed textures appear to thick for pt. Discussed with kitchen staff if they could make pureed textures more thin/runny/baby food consistency. Pt tolerated half Ensure via straw without overt s/s aspiration. Pt also utilized dysphagia cup for sip drinks. Uncertain if pt will be able to maintain adequate nutrition/hydration. Consider RD consult.              STATUS: continue pureed, thin  2) Pt to demonstrate functional knowledge and use of compensatory swallowing strategies to 90% to reduce risk of aspiration and s/s dysphagia.  PROGRESS: SLP assisted in controlled sip drinks via straw. Pt able to utilize dysphagia cup independently.              STATUS: continue controlled sip drinks.     LONG TERM GOAL: Patient will tolerate the least restrictive diet without overt difficulty or pulmonary compromise by time of discharge    Therapeutic Swallow:  Therapeutic Swallow Intervention : Compensatory Strategies, PO Trials  Solid Diet Recommendations: Pureed/extremely thick  (IDDSI Level 4)  Liquid Diet Recommendations: Thin (IDDSI Level 0)  Swallow Comments: medications crushed in applesauce    SPEECH-LANGUAGE GOALS (established 11/5, anticipate end 2 weeks):  Pt will accurately locate 4 icons on SGD.   PROGRESS: Did not attempt SGD as focus was on dysphagia.   STATUS: continue  2. Pt will utilize communication board/icons to express at least 1 want/need.  PROGRESS: Focused on insuring that pt was able to utilize call button. SLP provided instruction for TV button. Pt was able to press call button when SLP held up control.   STATUS: continue  3. Ongoing assessment if indicated for further goal development or to determine progress, as needed.  LTG: Pt will improve expressive communication for basic needs/wants.    Inpatient Education:  Adult Inpatient Education  Individual(s) Educated: Patient, Other (kitchen staff)  Verbal Education : discussed a thinner pureed texture with kitchen  staff  Risk and Benefits Discussed with Patient/Caregiver/Other: yes  Patient/Caregiver Demonstrated Understanding: yes  Plan of Care Discussed and Agreed Upon: yes  Patient Response to Education: Patient/Caregiver Verbalized Understanding of Information  Education Comment: Pt gave a thumbs up    Consultations/Referrals/Coordination of Services: Discussed thinner pureed texture items with kitchen staff. Consider RD consult to insure adequate nutrition/hydration.

## 2024-11-06 NOTE — CONSULTS
"Nutrition Assessement Note    Nutrition Assessment    Reason for Assessment: Admission nursing screening    Malnutrition Screening Tool (MST)  Have you recently lost weight without trying?: Unsure  Weight Loss Score: 2  Have you been eating poorly because of a decreased appetite?: No  Malnutrition Score: 2  Nutrition Screen  Stage 3 or 4 Pressure Injury or Multiple Non-Healing Wounds: No  Home Tube Feeding or Total Parenteral Nutrition (TPN): No  Dietitian Consult Needed: No    Reason for Hospital Admission:  Leidy Teixeira is a 54 y.o. female who is admitted to rehab following falls which resulting in SDH - treated conservatively. Hx progressive supranuclear palsy and parkinson's disease.     Past Medical History:   Diagnosis Date    Anxiety     Dysphagia     HTN (hypertension), benign     Parkinsons (Multi)     PSP (progressive supranuclear palsy) (Multi)       Past Surgical History:   Procedure Laterality Date    TUBAL LIGATION      TUBAL LIGATION       Nutrition History:  Food and Nutrient History: per RN, pt is taking bites of food but drinking fluids. noting thick saliva at this time. pt unable to provide much info. occasionally give thumbs up.  Energy Intake: Poor < 50 %    Anthropometrics:  Ht: 167.6 cm (5' 5.98\"), Wt: 47.7 kg (105 lb 1.6 oz), BMI: 16.97  IBW/kg (Dietitian Calculated): 59.09 kg  Percent of IBW: 81 %    Weight Change:  Daily Weight  11/04/24 : 47.7 kg (105 lb 1.6 oz)  09/14/24 : 104#  06/18/24 : 110#     Weight History / % Weight Change: additional wts obtained via chart review. pt unable to provide info regarding wt hx. possible 5# (4.5%) wt loss over ~5 months (6/18-11/4)  Significant Weight Loss: No (not significant yet undesirable)    Nutrition Focused Physical Exam Findings:   Subcutaneous Fat Loss  Orbital Fat Pads: Severe (dark circles, hollowing and loose skin) (scab and bruising to L eye/face)  Buccal Fat Pads: Severe (hollow, sunken and narrow face)    Muscle Wasting  Temporalis: " Severe (hollowed scooping depression)  Pectoralis (Clavicular Region): Severe (protruding prominent clavicle)  Deltoid/Trapezius: Severe (squared shoulders, acromion process prominent)  Interosseous: Severe (depressed area between thumb and forefinger)    Nutrition Significant Labs:  Lab Results   Component Value Date    WBC 7.1 11/05/2024    HGB 13.3 11/05/2024    HCT 41.4 11/05/2024     11/05/2024     11/05/2024    K 4.6 11/05/2024     11/05/2024    CREATININE 0.57 11/05/2024    BUN 33 (H) 11/05/2024    CO2 28 11/05/2024     Nutrition Specific Medications:  dextromethorphan-quinidine, 1 capsule, oral, BID  escitalopram, 10 mg, oral, Daily  glycopyrrolate, 1 mg, oral, Daily  lisinopril, 10 mg, oral, Daily  nystatin, 5 mL, Swish & Swallow, TID  pramipexole, 0.5 mg, oral, Nightly      Dietary Orders (From admission, onward)       Start     Ordered    11/06/24 1519  Oral nutritional supplements  Until discontinued        Comments: Or equivalent ensure product   Question Answer Comment   Deliver with All meals    Select supplement: Ensure Compact        11/06/24 1518    11/05/24 1106  Adult diet Regular; Pureed 4; Thin 0; 1:1 Feeding  Diet effective now        Comments: Upright position, controlled sips, small bites/sips, eat/feed slowly   Question Answer Comment   Diet type Regular    Texture Pureed 4    Fluid consistency Thin 0    Select tray type: 1:1 Feeding        11/05/24 1106    11/04/24 2331  May Participate in Room Service With Assistance  ( ROOM SERVICE MAY PARTICIPATE WITH ASSISTANCE)  Once        Question:  .  Answer:  Yes    11/04/24 2330                  Estimated Needs:   Estimated Energy Needs  Total Energy Estimated Needs (kCal): 1432 kCal  Total Estimated Energy Need per Day (kCal/kg): 30 kCal/kg  Method for Estimating Needs: actual wt    Estimated Protein Needs  Total Protein Estimated Needs (g): 57 g  Total Protein Estimated Needs (g/kg): 1.2 g/kg  Method for Estimating Needs:  actual wt    Estimated Fluid Needs  Method for Estimating Needs: 1 ml/kcal        Nutrition Diagnosis   Nutrition Diagnosis:  Malnutrition Diagnosis  Patient has Malnutrition Diagnosis: Yes  Diagnosis Status: New  Malnutrition Diagnosis: Severe malnutrition related to chronic disease or condition  As Evidenced by: po intake </= 75% estimated needs for >/= 1 month, severe subcutaneous fat loss and muscle wasting       Nutrition Interventions/Recommendations   Nutrition Interventions and Recommendations:    Nutrition Prescription:  Individualized Nutrition Prescription Provided for : 1432 kcals and 57g protein to be provided via diet and supplements    Nutrition Interventions:   Food and/or Nutrient Delivery Interventions  Interventions: Meals and snacks, Medical food supplement  Meals and Snacks: Texture-modified diet  Goal: provide per SLP recs  Medical Food Supplement: Commercial beverage  Goal: ensure compact TID to provide 220 kcals and 9g protein each. if unavailable, provide equivalent ensure product.    Education Documentation  No documentation found.           Nutrition Monitoring and Evaluation   Monitoring/Evaluation:   Food/Nutrient Related History Monitoring  Monitoring and Evaluation Plan: Energy intake  Energy Intake: Estimated energy intake  Criteria: pt to consume >/= 75% estimated needs    Body Composition/Growth/Weight History  Monitoring and Evaluation Plan: Weight  Weight: Measured weight  Criteria: pt will maintain/gain wt       Time Spent/Follow-up:   Follow Up  Time Spent (min): 30 minutes  Last Date of Nutrition Visit: 11/06/24  Nutrition Follow-Up Needed?: 5-7 days  Follow up Comment: 11/12/24

## 2024-11-06 NOTE — PROGRESS NOTES
11/06/24 1430 to 1500   General   Reason for Referral decreased mobility, SDH   Referred By Dr. Patterson   Past Medical History Relevant to Rehab HTN, anxiety, dysphagia, Parkinson's, progressive supranuclear palsy, dystona   Precautions   Hearing/Visual Limitations slowed smooth pursuits, saccades with limited eye movement especially past midline due to supranuclear palsy and resulting limited movement of eyes. Convergence/divergence absent. Must turn head to look past midline. Lower half of visual field appears to be limited. Has prism glasses at home.   Medical Precautions Fall precautions;Swallowing precautions   Precautions Comment puree diet and thin liquids   Pain Assessment   Pain Assessment 0-10   0-10 (Numeric) Pain Score 0 - No pain   Cognition   Cognition Comments comunicates with thumbs up and down   Feeding   Feeding Adaptive Equipment   (dysphia cup)   Feeding Level of Assistance Close supervision   Feeding Where Assessed Bed level   Feeding Comments drinking only   Bed Mobility   Bed Mobility Yes   Bed Mobility 1   Bed Mobility 1 Sitting to supine   Level of Assistance 1 Minimum assistance   Bed Mobility Comments 1 able to lift both legs into bed assist with trunk retro pulsive   Bed Mobility 2   Bed Mobility  2   (moving up in bed patient with cues and handplacement able to move towards the head of the bed)   Transfers   Transfer Yes   Transfer 1   Transfer From 1 Wheelchair to   Transfer to 1 Bed   Technique 1 Stand pivot   Transfer Device 1 Gait belt   Transfer Level of Assistance 1 Minimum assistance   Trials/Comments 1 shuffling steps and ridgid trunk   Transfers 2   Transfer From 2 Wheelchair to   Transfer to 2 Stand   Technique 2 Sit to stand   Transfer Device 2 Gait belt   Transfer Level of Assistance 2 Minimum assistance   Trials/Comments 2 wioth cues for handplacement   Therapeutic Activity   Therapeutic Activity Performed Yes   Therapeutic Activity 2 over head arc both directions seated  in regular wheelchair. Completed for endurance and following directions   Therapeutic Activity 3 clothes pegs clipping onto horizontal luke required peg put in hand and assist to locate the luke secondary to vision. Completed four pegs   IP OT Assessment   End of Session Communication Bedside nurse   End of Session Patient Position Alarm on;Bed, 3 rail up   Inpatient Plan   Treatment Interventions ADL retraining;UE strengthening/ROM   OT - OK to Discharge Yes

## 2024-11-06 NOTE — PROGRESS NOTES
11/06/24 1000 to 1100   General   Reason for Referral decreased mobility, SDH   Referred By Dr. Patterson   Past Medical History Relevant to Rehab HTN, anxiety, dysphagia, Parkinson's, progressive supranuclear palsy, dystona   Prior to Session Communication Bedside nurse   Patient Position Received Bed, 2 rail up   Preferred Learning Style verbal;auditory   General Comment Patient agreeable to therapy patient comunicating via thumbs up and down   Precautions   Precautions Comment latex allergy   Pain Assessment   Pain Assessment 0-10   0-10 (Numeric) Pain Score 0 - No pain  (via thumb movement)   Feeding   Feeding Adaptive Equipment Lid with spout   Feeding Level of Assistance Dependent   Feeding Where Assessed Bed level   Feeding Comments Pt delayed swallow pureed/ increased time to process swallowing liquids/ limited opening mouth   Grooming   Grooming Level of Assistance Moderate assistance  (assist with pink swab to completed oral care. Difficult to be through secondary to very little jaw movement)   Grooming Where Assessed Wheelchair   Grooming Comments able to wash face with set up   UE Bathing   UE Bathing Level of Assistance Minimum assistance   UE Bathing Where Assessed Wheelchair   UE Bathing Comments not through cues to [id number]   LE Bathing   LE Bathing Level of Assistance Moderate assistance   LE Bathing Where Assessed Bed level   LE Bathing Comments Patient able to utilize long handled sponge not thorough but completed assist with buttocks   UE Dressing   UE Dressing Level of Assistance Moderate assistance   UE Dressing Where Assessed   (tilt and space chair)   UE Dressing Comments able to thread both arms assist over head patient able to pull down   LE Dressing   LE Dressing Yes   LE Dressing Adaptive Equipment Reacher   Pants Level of Assistance Minimum assistance  (able to thread both legs min assist with pull up patient bridging)   Sock Level of Assistance Dependent   Shoe Level of Assistance    (not donned)   Adult Briefs Level of Assistance Dependent   LE Dressing Where Assessed Bed level   Bed Mobility   Bed Mobility Yes   Bed Mobility 1   Bed Mobility 1 Rolling left   Level of Assistance 1 Minimum assistance   Bed Mobility 2   Bed Mobility  2   (bridging in bed for pants pull up)   Level of Assistance 2 Set up   Bed Mobility 3   Bed Mobility 3 Supine sitting   Level of Assistance 3 Moderate assistance   Bed Mobility Comments 3 assist with trunk   Transfers   Transfer Yes   Transfer 1   Transfer From 1 Bed to   Transfer to 1   (tilt and space)   Technique 1 Stand pivot   Transfer Device 1 Gait belt   Transfer Level of Assistance 1 Minimum assistance;Moderate assistance   Trials/Comments 1 cues for handplacement patient ridge at trunk able to follow directions   Static Sitting Balance   Static Sitting-Balance Support Bilateral upper extremity supported   Static Sitting-Level of Assistance Close supervision   Static Sitting-Comment/Number of Minutes initial min a then able to maintain balnce with set up with fatigue right lateral lean   IP OT Assessment   OT Assessment Patient particpating in therapy comunicating with thumbs up and down. Patient is making improvement towards OT goals   Prognosis Fair   Barriers to Discharge None   Evaluation/Treatment Tolerance Patient tolerated treatment well   Medical Staff Made Aware Yes   End of Session Communication Bedside nurse   End of Session Patient Position   (tilt in space)   OT Assessment   OT Assessment Results Decreased ADL status;Decreased upper extremity range of motion;Decreased upper extremity strength;Decreased safe judgment during ADL;Decreased endurance;Visual deficit;Decreased fine motor control;Decreased functional mobility;Decreased gross motor control   Strengths Living arrangement secure;Support of Caregivers   Barriers to Participation Comorbidities   Education   Individual(s) Educated Patient   Education Provided   (functional transfers)    Patient Response to Education   (followed directions)   Inpatient/Swing Bed or Outpatient   Inpatient/Swing Bed or Outpatient Inpatient   Inpatient Plan   Treatment Interventions ADL retraining   OT - OK to Discharge Yes

## 2024-11-06 NOTE — ED PROVIDER NOTES
HPI   Chief Complaint   Patient presents with    Aspiration     Choked on medication        HPI  Patient is a 54-year-old female with history of dysphagia, hypertension, end-stage progressive supranuclear palsy, and Parkinson's disease presenting from her skilled rehab facility for evaluation for possible aspiration.  Per the nursing facility, they were giving her 2.5 mL of liquid medication for her dysphagia cup and they witnessed her start choking on the medication and were concerned that she aspirated.  Patient has been acting appropriately since.  She is accompanied by her  who states that she is at her baseline mental status but did just have a subdural bleed 1-1/2 weeks ago thus her mental status has changed rapidly over the past week.  Patient has no acute complaints and is resting comfortably in exam bed at this time.      Patient History   Past Medical History:   Diagnosis Date    Anxiety     Dysphagia     HTN (hypertension), benign     Parkinsons (Multi)     PSP (progressive supranuclear palsy) (Multi)      Past Surgical History:   Procedure Laterality Date    TUBAL LIGATION      TUBAL LIGATION       Family History   Family history unknown: Yes     Social History     Tobacco Use    Smoking status: Never    Smokeless tobacco: Never   Vaping Use    Vaping status: Never Used   Substance Use Topics    Alcohol use: Never    Drug use: Never       Physical Exam   ED Triage Vitals [11/06/24 1602]   Temperature Heart Rate Respirations BP   36.7 °C (98.1 °F) 86 18 102/79      Pulse Ox Temp Source Heart Rate Source Patient Position   96 % Temporal Monitor --      BP Location FiO2 (%)     -- --       Physical Exam  Vitals and nursing note reviewed.   Constitutional:       General: She is not in acute distress.     Appearance: She is well-developed.      Comments: Chronically ill-appearing disheveled and cachectic but otherwise no apparent distress   HENT:      Head: Normocephalic and atraumatic.   Eyes:       Conjunctiva/sclera: Conjunctivae normal.   Cardiovascular:      Rate and Rhythm: Normal rate and regular rhythm.      Heart sounds: No murmur heard.  Pulmonary:      Effort: Pulmonary effort is normal. No respiratory distress.      Breath sounds: Normal breath sounds.      Comments: Lungs clear to auscultation bilaterally  Abdominal:      Palpations: Abdomen is soft.      Tenderness: There is no abdominal tenderness.   Musculoskeletal:         General: No swelling.      Cervical back: Neck supple.   Skin:     General: Skin is warm and dry.      Capillary Refill: Capillary refill takes less than 2 seconds.   Neurological:      Mental Status: She is alert. Mental status is at baseline.      Comments: At her baseline mental status per .  Moving all extremities symmetrically and following commands.  No facial droop or slurred speech   Psychiatric:         Mood and Affect: Mood normal.           ED Course & MDM   Diagnoses as of 11/09/24 0700   Encounter for medical screening examination   History of progressive supranuclear palsy                 No data recorded     [place] Coma Scale Score: 11 (11/06/24 1602 : Jada Chambers RN)                           Medical Decision Making  Parts of this chart have been completed using voice recognition software. Please excuse any errors of transcription.  My thought process and reason for plan has been formulated from the time that I saw the patient until the time of disposition and is not specific to one specific moment during their visit and furthermore my MDM encompasses this entire chart and not only this text box.      HPI: Detailed above.    Exam: A medically appropriate exam performed, outlined above, given the known history and presentation.    History obtained from: Family, nursing staff  54-year-old female  Social Determinants of Health considered during this visit: Currently residing at a skilled nursing facility    Medications given during  visit:  Medications - No data to display     Diagnostic/tests  Labs Reviewed   COMPREHENSIVE METABOLIC PANEL - Abnormal       Result Value    Glucose 100 (*)     Sodium 138      Potassium 4.1      Chloride 105      Bicarbonate 25      Anion Gap 12      Urea Nitrogen 32 (*)     Creatinine 0.46 (*)     eGFR >90      Calcium 9.8      Albumin 4.3      Alkaline Phosphatase 74      Total Protein 7.2      AST 15      Bilirubin, Total 0.8      ALT 20     CBC WITH AUTO DIFFERENTIAL    WBC 8.3      nRBC 0.0      RBC 4.24      Hemoglobin 13.3      Hematocrit 40.7      MCV 96      MCH 31.4      MCHC 32.7      RDW 12.7      Platelets 269      Neutrophils % 78.0      Immature Granulocytes %, Automated 0.2      Lymphocytes % 14.8      Monocytes % 6.1      Eosinophils % 0.5      Basophils % 0.4      Neutrophils Absolute 6.45      Immature Granulocytes Absolute, Automated 0.02      Lymphocytes Absolute 1.22      Monocytes Absolute 0.50      Eosinophils Absolute 0.04      Basophils Absolute 0.03        CT head wo IV contrast   Final Result   1. No acute intracranial abnormality identified.   2. Nonspecific white matter changes, likely sequela of small-vessel   ischemic disease.   3. Mild frontal lobe predominant cerebral volume loss.                  MACRO:   None        Signed by: Amador Cole 11/6/2024 6:05 PM   Dictation workstation:   [id number]      XR chest 2 views   Final Result   No acute cardiopulmonary process.        MACRO:   None        Signed by: Navid Baugh 11/6/2024 4:45 PM   Dictation workstation:   [id number]           Considerations/further MDM:  Patient is a 54-year-old female presenting for evaluation of possible aspiration    The patient is awake and alert in no apparent distress during the ER visit.  She is at her baseline mental status and appearance based on family members.  Vital signs are within normal limits during the visit.  Laboratory workup is unremarkable without evidence of leukocytosis,  electrolyte abnormality.  Chest x-ray is without evidence of acute process such as pneumonia, pneumothorax or a foreign body or aspirated contents.  CT is without evidence of acute mass, midline shift edema or hemorrhage.  Patient is released back to her nursing facility in good condition.  Strict return precautions are discussed with with the family members.  I discussed the laboratory and imaging findings with the patient at bedside. Patient's questions and concerns were addressed. Patient was released in good condition, discharged with instructions to follow up with primary care provider and appropriate specialist, and to return to ED at any time for worsening symptoms or any other concerns. Patient demonstrates understanding of the findings and the importance of appropriate follow up care.       Procedure  Procedures     Elenita Mcdermott PA-C  11/09/24 0700

## 2024-11-06 NOTE — DISCHARGE INSTRUCTIONS
Please continue to use the dysphagia And monitor symptoms at home and continue to work on your swallowing exercises.  Present back to ER for any new onset or worsening of symptoms despite therapy.    It is important to remember that your care does not end here and you must continue to monitor your condition closely. Please return to the emergency department for any worsening or concerning signs or symptoms as directed by our conversations and the discharge instructions. If you do not have a doctor please contact the referral number on your discharge instructions. Please contact any physician specialists provided in your discharge notes as it is very important to follow up with them regarding your condition. If you are unable to reach the physicians provided, please come back to the Emergency Department at any time.

## 2024-11-06 NOTE — PROGRESS NOTES
Leidy Teixeira is a 54 year old female admitted to  [facility] 11.4.24. LSW is following for discharge planning. At baseline, patient lives with spouse and their child. Patient has 24 hour care and support from family including father in law and has been active with [facility] in the past. Primary care provider is Chiara Hatch PA-C and pharmacy of choice is [facility] in [place]. ADOD is evolving due to new admission status. LSW will follow as a resource for transition of care and will facilitate family training, updates regarding insurance, meeting with patient to answer questions or concerns, and collaborate with IDT on patient needs to ensure safe return home. IDT held this date and LSW will follow up to provide update to patient and spouse on 11.6.24.

## 2024-11-06 NOTE — NURSING NOTE
The Nurse Kianna Jimenez called this nurse in and said the patient was gasping for air.  This nurse observed the patient gasping for air and  panicking.  This nurse attempted to look in the patients mouth and sweep it and then attempted a heimlich maneuver.  This nurse alerted all staff via [employer] to come to the patients room.  Therapy staff/nursing staff arrived at the patients bedside.  The patient continued to gasp for air in a stridor wheeze, pulse ox at 80% room air.   When the crash cart arrived this nurse attempted to suction the patient with no results, and staff were in route to obtain oxygen.  9-1-1 was called and they arrived 5 minutes later.  The patient was stable and her pulse ox maintained at 94% room air.  The patient was calm but gave a thumbs up when asked if she wanted to go to the hospital.  Dr. Patterson, the  Jim  and the supervisor were notified.  ER staff were given report and the patient was transported to [facility].

## 2024-11-06 NOTE — PROGRESS NOTES
Physical Therapy       11/06/24 6670-6165   PT  Visit   PT Received On 11/06/24   Response to Previous Treatment Patient reporting fatigue but able to participate.   General   Reason for Referral decreased mobility, SDH   Referred By Dr. Patterson   Past Medical History Relevant to Rehab HTN, anxiety, dysphagia, Parkinson's, progressive supranuclear palsy, dystona   Precautions   Hearing/Visual Limitations slowed smooth pursuits, saccades with limited eye movement especially past midline due to supranuclear palsy and resulting limited movement of eyes. Convergence/divergence absent. Must turn head to look past midline. Lower half of visual field appears to be limited. Has prism glasses at home.   Medical Precautions Fall precautions;Swallowing precautions   Precautions Comment puree diet and thin liquids   Pain Assessment   Pain Assessment 0-10   0-10 (Numeric) Pain Score 0 - No pain   Cognition   Orientation Level Oriented X4   Cognition Comments communicates with thumbs up/ down/ neutral   Therapeutic Exercise   Therapeutic Exercise Activity 1 Omnicycle level 2 x 10 minutes, 3.1 km, 98% activity   Therapeutic Activity   Therapeutic Activity Performed Yes   Therapeutic Activity 2 Patient had episode of urinary and bowel incontinence. Stood in bathroom at grab bar with min assist. Dependent for bob care, brief, pants, and nonskid socks change.   Balance/Neuromuscular Re-Education   Balance/Neuromuscular Re-Education Activity Performed Yes   Balance/Neuromuscular Re-Education Activity 1 STS 1 x 10 with cues for anterior weight shift when standing with good carryover and min assist. Cues for  knee and trunk flexion when siting with fair carryover and min assist and increased time   Transfer 1   Technique 1 Sit to stand;Stand to sit   Transfer Device 1 Gait belt   Transfer Level of Assistance 1 Minimum assistance   Trials/Comments 1 step by step cues for COG over MARTINA due to rigidity and posterior lean   Wheelchair  Activities   Propulsion Type 1 Manual   Level 1 Level tile   Method 1 Right upper extremity;Left upper extremity   Level of Assistance 1 Contact guard   Description/Details 1 50' includes turns, cues for direction and obstacle avoidance   Other Activity   Other Activity Performed Yes   Other Activity 1 gentle massage and gentle stretching cervical musculature with imporved head position and some reflief reported by patient (thumbs up).   Activity Tolerance   Endurance Tolerates 30 min exercise with multiple rests   PT Assessment   PT Assessment Results Decreased strength;Decreased range of motion;Decreased endurance;Impaired balance;Decreased mobility;Decreased coordination;Impaired vision;Impaired tone   Rehab Prognosis Good  (for goals listed)   Evaluation/Treatment Tolerance Patient tolerated treatment well   End of Session Patient Position Up in chair   PT Plan   Inpatient/Swing Bed or Outpatient Inpatient   PT Plan   Treatment/Interventions Transfer training;Balance training;Strengthening;Endurance training;Therapeutic activity;Wheelchair management   PT Recommended Transfer Status Assist x1   PT - OK to Discharge Yes

## 2024-11-07 PROCEDURE — 97110 THERAPEUTIC EXERCISES: CPT | Mod: GP

## 2024-11-07 PROCEDURE — 92526 ORAL FUNCTION THERAPY: CPT | Mod: GN

## 2024-11-07 PROCEDURE — 97530 THERAPEUTIC ACTIVITIES: CPT | Mod: GP,CQ

## 2024-11-07 PROCEDURE — 97116 GAIT TRAINING THERAPY: CPT | Mod: GP

## 2024-11-07 PROCEDURE — 2500000001 HC RX 250 WO HCPCS SELF ADMINISTERED DRUGS (ALT 637 FOR MEDICARE OP): Performed by: INTERNAL MEDICINE

## 2024-11-07 PROCEDURE — 99232 SBSQ HOSP IP/OBS MODERATE 35: CPT | Performed by: INTERNAL MEDICINE

## 2024-11-07 PROCEDURE — 97535 SELF CARE MNGMENT TRAINING: CPT | Mod: GO,CO

## 2024-11-07 PROCEDURE — 92507 TX SP LANG VOICE COMM INDIV: CPT | Mod: GN

## 2024-11-07 PROCEDURE — 1180000001 HC REHAB PRIVATE ROOM DAILY

## 2024-11-07 ASSESSMENT — PAIN SCALES - GENERAL
PAINLEVEL_OUTOF10: 0 - NO PAIN
PAINLEVEL_OUTOF10: 3
PAINLEVEL_OUTOF10: 0 - NO PAIN

## 2024-11-07 ASSESSMENT — ACTIVITIES OF DAILY LIVING (ADL)
BATHING_LEVEL_OF_ASSISTANCE: MODERATE ASSISTANCE
HOME_MANAGEMENT_TIME_ENTRY: 90
BATHING_EQUIPMENT_NEEDED: REMOVEABLE SHOWER HEAD
BATHING_WHERE_ASSESSED: SHOWER

## 2024-11-07 ASSESSMENT — PAIN DESCRIPTION - DESCRIPTORS: DESCRIPTORS: PATIENT UNABLE TO DESCRIBE

## 2024-11-07 ASSESSMENT — PAIN - FUNCTIONAL ASSESSMENT
PAIN_FUNCTIONAL_ASSESSMENT: FLACC (FACE, LEGS, ACTIVITY, CRY, CONSOLABILITY)
PAIN_FUNCTIONAL_ASSESSMENT: 0-10

## 2024-11-07 ASSESSMENT — PAIN DESCRIPTION - ORIENTATION: ORIENTATION: ANTERIOR

## 2024-11-07 ASSESSMENT — PAIN DESCRIPTION - LOCATION: LOCATION: HEAD

## 2024-11-07 NOTE — CARE PLAN
The patient's goals for the shift include  maintain safety    The clinical goals for the shift include maintain safety

## 2024-11-07 NOTE — PROGRESS NOTES
Speech-Language Pathology    SLP Adult IR[facility] Dysphagia and Speech-Language Pathology Treatment     Patient Name: Leidy Teixeira  MRN: [id number]  Today's Date: 11/7/2024  Time Calculation  Start Time: 0800  Stop Time: 0840  Time Calculation (min): 40 min     3/4sw +sp    Current Problem:   SDH with PMH significant for HTN, anxiety, dysphagia, Parkinson's, progressive supranuclear palsy, dystonia     SLP Assessment:  SLP TX Intervention Outcome: Making Progress Towards Goals  SLP Assessment Results: Expression deficits, Other (Comment) (dysphagia)  Prognosis: Good  Treatment Tolerance: Patient tolerated treatment well  Medical Staff Made Aware: Yes  Strengths: Family/Caregiver Support, Motivation  Barriers: Comorbidities  Education Provided: Yes     Plan:  Inpatient/Swing Bed or Outpatient: Inpatient  Treatment/Interventions: Expressive Language, Other (Comment) (dysphagia)  SLP TX Plan: Continue Plan of Care  SLP Plan: Skilled SLP  SLP Frequency: 4x per week  Duration: 2 weeks  SLP Discharge Recommendations: Other (Comment) (TBD pending progress)  Next Treatment Priority: diet tolerance, AAC  Discussed POC: Patient, Nursing  Discussed Risks/Benefits: Yes, Patient, Nursing  Patient/Caregiver Agreeable: Yes    Subjective   Pt seen upright in bed. Pt was transferred to ED yesterday and came back due to choking episode. Uncertain at this time if related to episode with Nystatin. However, pt did give a thumbs up when asked if she was really tired at the time. Spouse had previously indicated that communication/swallowing tend to decompensate when fatigued.     Per 11/6 CXR: IMPRESSION:  No acute cardiopulmonary process.    General Visit Information:   Reason for Referral: Follow-up dysphagia and speech-language treatment  Referred By: Dr. Patterson  Past Medical History Relevant to Rehab: HTN, anxiety, dysphagia, Parkinson's, progressive supranuclear palsy, dystona  Prior to Session Communication: Bedside nurse    Pain  Assessment:  Pain Assessment  0-10 (Numeric) Pain Score: 0 - No pain      Objective   DYSPHAGIA GOALS (established 11/5 , anticipated end 2 weeks):  1) Pt to tolerate recommended diet to 90% without overt s/s of aspiration in order to ensure pt is demonstrating adequate swallow function for meeting nutrition and hydration needs.   PROGRESS: Pt had already consumed approximately 25% of breakfast meal. No overt s/s aspiration were reported. Pt was able to take med's crushed in applesauce without overt s/s aspiration. RN applied Nystatin to lingual surface with oral swab/toothette. No overt s/s aspiration. Pt was able to self feed Ensure via dysphagia cup. No overt s/s aspiration.   STATUS: continue pureed, thin with 1:1 assist with feed and controlled sip drinks. PO intake only when pt is alert and not overly fatigued. Consider RD consult. Uncertain if pt will be able to maintain adequate nutrition/hydration.  2) Pt to demonstrate functional knowledge and use of compensatory swallowing strategies to 90% to reduce risk of aspiration and s/s dysphagia.  PROGRESS: Pt able to utilize dysphagia cup independently. Cues to swallow prior to next bite/sip.              STATUS: continue controlled sip drinks and cued swallow.     LONG TERM GOAL: Patient will tolerate the least restrictive diet without overt difficulty or pulmonary compromise by time of discharge    Therapeutic Swallow:  Therapeutic Swallow Intervention : Compensatory Strategies, PO Trials  Solid Diet Recommendations: Pureed/extremely thick  (IDDSI Level 4)  Liquid Diet Recommendations: Thin (IDDSI Level 0)  Swallow Comments: medications crushed in applesauce    SPEECH-LANGUAGE GOALS (established 11/5, anticipate end 2 weeks):  Pt will accurately locate 4 icons on SGD.  PROGRESS: Collaboration with patient to determine icon display. Pt gave a thumbs up to 2x2 icon page display.              STATUS: continue  2. Pt will utilize communication board/icons to  express at least 1 want/need.  PROGRESS: Focused on insuring that pt was able to utilize call button. Staff reports pt has utilized call button. Attempted low tech communication board. Pt gave a thumbs up in response to questions vs pointing.              STATUS: continue  3. Ongoing assessment if indicated for further goal development or to determine progress, as needed.  LTG: Pt will improve expressive communication for basic needs/wants.    Inpatient Education:  Adult Inpatient Education  Individual(s) Educated: Patient, Other (staff)  Verbal Education : thinner/more moist pureed items, single bites/sips, insure pt has swallowed prior to next bite/sip  Risk and Benefits Discussed with Patient/Caregiver/Other: yes  Patient/Caregiver Demonstrated Understanding: yes  Plan of Care Discussed and Agreed Upon: yes  Patient Response to Education: Patient/Caregiver Verbalized Understanding of Information  Education Comment: Pt gave a thumbs up    Consultations/Referrals/Coordination of Services: Discussed feeding results and recommendations with RN, PCA. Consider RD consult for adequate nutrition/hydration.

## 2024-11-07 NOTE — PROGRESS NOTES
"Physical Therapy       11/07/24 [id number]   PT  Visit   PT Received On 11/07/24   Response to Previous Treatment Patient reporting fatigue but able to participate.   General   Reason for Referral decreased mobility, SDH   Referred By Dr. Patterson   Past Medical History Relevant to Rehab HTN, anxiety, dysphagia, Parkinson's, progressive supranuclear palsy, dystona   Patient Position Received Up in chair;Alarm off, not on at start of session   Preferred Learning Style verbal;auditory   General Comment Agreeable to treatment. Pt answers yes/no questions with thumbs up/ thumbs down   Precautions   Medical Precautions Fall precautions;Swallowing precautions   Precautions Comment puree diet and thin liquids   Pain Assessment   Pain Assessment 0-10   0-10 (Numeric) Pain Score 0 - No pain   Therapeutic Activity   Therapeutic Activity 1 sit/stands 1x5 reps.  CGA for sit to stand, Meg for stand to sit due to rigidity.  Vc's given for bending forward at waist to drop shoulders down \"parallel\" with floor to promote flexion.  Extra time needed to complete.   Ambulation/Gait Training 1   Surface 1 Level tile   Device 1   (bilateral platform U step RW)   Gait Support Devices Gait belt;Wheelchair follow   Assistance 1 Minimum assistance   Quality of Gait 1 NBOS;Inconsistent stride length   Comments/Distance (ft) 1 50 ft.  Turns included.  Vc's to weight shift forward to rest UE's on platform to reduce retropulsion.   Transfer 1   Technique 1 Sit to stand;Stand to sit   Transfer Device 1 Gait belt   Transfer Level of Assistance 1 Minimum assistance   Trials/Comments 1 cues for technique   Activity Tolerance   Endurance Tolerates 30 min exercise with multiple rests   PT Assessment   PT Assessment Results Decreased strength;Decreased range of motion;Decreased endurance;Impaired balance;Decreased mobility;Decreased coordination;Impaired vision;Impaired tone   Rehab Prognosis Good   Evaluation/Treatment Tolerance Patient tolerated " treatment well   End of Session Patient Position Up in chair;Alarm off, not on at start of session   PT Plan   Inpatient/Swing Bed or Outpatient Inpatient   PT Plan   Treatment/Interventions Bed mobility;Transfer training;Gait training;Neuromuscular re-education;Therapeutic exercise;Therapeutic activity   Equipment Recommended upon Discharge Other (comment)  (tilt-in-space w/c with head support)   PT Recommended Transfer Status Assist x1

## 2024-11-07 NOTE — PROGRESS NOTES
Physical Therapy       11/07/24 2560-2845   PT  Visit   PT Received On 11/07/24   Response to Previous Treatment Patient reporting fatigue but able to participate.   General   Reason for Referral decreased mobility, SDH   Referred By Dr. Patterson   Past Medical History Relevant to Rehab HTN, anxiety, dysphagia, Parkinson's, progressive supranuclear palsy, dystona   Prior to Session Communication Bedside nurse   Patient Position Received Up in chair;Alarm off, not on at start of session   Preferred Learning Style verbal;auditory   General Comment Patient agreeable to therapy patient comunicating via thumbs up and down   Precautions   Hearing/Visual Limitations slowed smooth pursuits, saccades with limited eye movement especially past midline due to supranuclear palsy and resulting limited movement of eyes. Convergence/divergence absent. Must turn head to look past midline. Lower half of visual field appears to be limited. Has prism glasses at home.   Medical Precautions Fall precautions;Swallowing precautions   Precautions Comment puree diet and thin liquids   Pain Assessment   Pain Assessment 0-10   0-10 (Numeric) Pain Score 0 - No pain   Cognition   Arousal/Alertness Delayed responses to stimuli   Following Commands Follows one step commands with increased time   Cognition Comments comunicates with thumbs up and down   Lumbar Spine    Lumbar Spine Comment trunk rigidity noted with decresed rotation and flexion noted   Therapeutic Exercise   Therapeutic Exercise Activity 2 bridging 2 x 15 without roll   Therapeutic Exercise Activity 3 supine hip abd/add 2 x 15, 2#   Therapeutic Exercise Activity 4 supine heel slides 2 x 15, 2#   Therapeutic Exercise Activity 5 hooklying trunk rotation 1 x 15 each side   Therapeutic Activity   Therapeutic Activity 1 sitting balance activity at edge of bed in room attempting to push up to sitting and to lower down to elbow mod A x 1   Bed Mobility 1   Bed Mobility 1 Sitting to supine    Level of Assistance 1 Minimum assistance   Bed Mobility Comments 1 able to lift both legs into bed assist with trunk retro pulsive   Bed Mobility 2   Bed Mobility  2 Supine to sitting   Level of Assistance 2 Minimum assistance   Bed Mobility Comments 2 assist for trunk up on flat mat   Ambulation/Gait Training 1   Surface 1 Level tile   Device 1   (NUSTEP B platform rolling walker)   Gait Support Devices Gait belt;Wheelchair follow   Assistance 1 Minimum assistance;Moderate assistance   Quality of Gait 1 NBOS;Inconsistent stride length   Comments/Distance (ft) 1 50' x 2 with u step with bilateral platform attachments. Tends to amb on toes initially but will progress to foot flat stepping and slight heel strike with cues. Also cues to increase MARTINA with attempt to correct. Assist to reduce speed of walker.   Transfer 1   Transfer From 1 Bed to   Transfer to 1 Wheelchair   Technique 1 Stand pivot   Transfer Device 1 Gait belt   Transfer Level of Assistance 1 Minimum assistance   Trials/Comments 1 step by step cues for COG over MARTINA due to rigidity and posterior lean   Transfers 2   Transfer From 2 Wheelchair to   Transfer to 2 Stand   Technique 2 Sit to stand;Stand to sit   Transfer Device 2   (NUSTEP rolling walker with B platform attachments)   Transfer Level of Assistance 2 Minimum assistance   Trials/Comments 2 vc and tactile cues throughout    Object From Floor   Comments unsafe   Wheelchair Activities   Propulsion Type 1 Manual   Level 1 Level tile   Method 1 Right upper extremity;Left upper extremity   Level of Assistance 1 Contact guard   Description/Details 1 50' includes turns, cues for direction and obstacle avoidance   Activity Tolerance   Endurance Tolerates 30 min exercise with multiple rests   PT Assessment   PT Assessment Results Decreased strength;Decreased range of motion;Decreased endurance;Impaired balance;Decreased mobility;Decreased coordination;Impaired vision;Impaired tone   Rehab  Prognosis Good   Evaluation/Treatment Tolerance Patient tolerated treatment well   Strengths Living arrangement secure   Barriers to Participation Comorbidities   End of Session Communication Bedside nurse   Assessment Comment Rigidity noted throughout all mobility. Patient is able to participate in sessions with rest breaks for fatigue.   End of Session Patient Position Up in chair;Alarm off, not on at start of session   PT Plan   Treatment/Interventions Bed mobility;Gait training;Transfer training;Endurance training;Therapeutic exercise;Therapeutic activity   PT Recommended Transfer Status Assist x1   PT - OK to Discharge Yes

## 2024-11-07 NOTE — PROGRESS NOTES
Occupational Therapy     11/07/24 [id number]   OT Last Visit   OT Received On 11/07/24   General   Reason for Referral decreased mobility, SDH   Referred By Dr. Patterson   Past Medical History Relevant to Rehab HTN, anxiety, dysphagia, Parkinson's, progressive supranuclear palsy, dystona   Prior to Session Communication Bedside nurse   Patient Position Received Up in chair;Alarm off, not on at start of session  (standard w/c)   General Comment Agreeable to treatment. Pt answers yes/no questions with thumbs up/ thumbs down   Precautions   Hearing/Visual Limitations slowed smooth pursuits, saccades with limited eye movement especially past midline due to supranuclear palsy and resulting limited movement of eyes. Convergence/divergence absent. Must turn head to look past midline. Lower half of visual field appears to be limited. Has prism glasses at home.   Medical Precautions Fall precautions;Swallowing precautions   Precautions Comment puree diet and thin liquids   Pain Assessment   Pain Assessment 0-10   0-10 (Numeric) Pain Score 0 - No pain   Cognition   Arousal/Alertness Delayed responses to stimuli   Orientation Level Unable to assess   Following Commands Follows one step commands with increased time   Cognition Comments communicates with thumbs up/thumbs down   Processing Speed Delayed   Coordination   Movements are Fluid and Coordinated No   Upper Body Coordination rigidity noted   Lower Body Coordination rigidity noted   Feeding   Feeding Level of Assistance Close supervision;Setup   Feeding Where Assessed Wheelchair   Feeding Comments drink from cup   Grooming   Grooming Level of Assistance Maximum assistance   Grooming Where Assessed Wheelchair   Grooming Comments Max A to comb knots from hair, braid hair   UE Bathing   UE Bathing Adaptive Equipment Removeable shower head   UE Bathing Level of Assistance Moderate assistance   UE Bathing Where Assessed Shower   UE Bathing Comments seated on rolling shower chair,  assist for thoroughness   LE Bathing   LE Bathing Adaptive Equipment Removeable shower head   LE Bathing Level of Assistance Moderate assistance   LE Bathing Where Assessed Shower   LE Bathing Comments assist with posterior, BLE seated on rolling shower chair, no standing   UE Dressing   UE Dressing Level of Assistance Moderate assistance   UE Dressing Where Assessed Wheelchair   UE Dressing Comments doff/miko overhead shirt   LE Dressing   Pants Level of Assistance Minimum assistance   Sock Level of Assistance Close supervision;Setup   Shoe Level of Assistance Modified independent   Adult Briefs Level of Assistance Maximum assistance   LE Dressing Where Assessed Wheelchair   LE Dressing Comments pt doffed  socks and shoes heel/toe technique, CGA for up/down, assist to hold pant legs in place. Pt donned socks figure 4 technique   Transfers 2   Transfer From 2 Wheelchair to   Transfer to 2   (rolling shower chair)   Technique 2 Sit to stand;Stand to sit   Transfer Device 2 Cholo Stedy   Transfer Level of Assistance 2 Dependent   Trials/Comments 2 Min A sit to stand on Cholo Stedy, 2nd person to transfer with cholo stedy   Shower Transfers   Shower Transfer From Wheelchair   Shower Transfer Type To and from   Shower Transfer to Shower seat with back  (rolling shower chair)   Shower Transfer Technique   (sit <>stand)   Shower Transfers Dependent   Shower Transfers Comments via Cholo Stedy   IP OT Assessment   OT Assessment Pt made good progress with showering task, decreased retro lean, able to stand on Cholo Stedy with Min A x1.   Prognosis Fair   Barriers to Discharge None   Evaluation/Treatment Tolerance Patient tolerated treatment well   End of Session Patient Position Up in chair;Alarm off, not on at start of session  (tilt in space w/c, all needs in reach.)   OT Assessment   OT Assessment Results Decreased ADL status;Decreased upper extremity range of motion;Decreased upper extremity strength;Decreased safe judgment  during ADL;Decreased endurance;Visual deficit;Decreased fine motor control;Decreased functional mobility;Decreased gross motor control   Education   Individual(s) Educated Patient   Education Provided Fall precautons;Risk and benefits of OT discussed with patient or other;POC discussed and agreed upon  (transfer techniques)   Patient Response to Education Patient/Caregiver Performed Return Demonstration of Exercises/Activities;Patient/Caregiver Verbalized Understanding of Information   Inpatient/Swing Bed or Outpatient   Inpatient/Swing Bed or Outpatient Inpatient   Inpatient Plan   Treatment Interventions ADL retraining;Functional transfer training;Equipment evaluation/education   OT Frequency 5 times per week   OT Discharge Recommendations High intensity level of continued care   OT Recommended Transfer Status Assist of 2;Dependent   OT - OK to Discharge Yes

## 2024-11-07 NOTE — PROGRESS NOTES
Met with patient, spouse, and son to review outcome of IDT discussion held 11.5.24. Reviewed Care transitions role and acute rehab discharge planning process. Reviewed prior level of function, current status, and plan of care for IRF stay as well as average length of stay, anticipated date of discharge, and discharge goals. Patient hopes to return home with family support and home health through [facility]. Patient's spouse is also interested in private duty care services as well as adaptive equipment including purewick catheter. LSW will continue to follow as a resource for transition of care and will facilitate family training, updates regarding insurance, meeting with patient to answer questions or concerns, and collaborate with IDT on patient needs to ensure safe return home. Consent to treat obtained and placed on chart. No questions or concerns at this time.

## 2024-11-07 NOTE — PROGRESS NOTES
"[facility] Comprehensive Rehabilitation  Physician Progress Note    Subjective   Leidy Teixeira is a 54 y.o. female admitted to inpatient rehabilitation unit.    She had an episode yesterday in which there was concern for aspiration.  She was eating and had a period of hypoxia and looked panicked.  This did resolve and she was seen in the emergency department and evaluated in the chest x-ray was negative.  She ate breakfast this morning without incident.  Speech therapist is aware.    Objective   /75 (BP Location: Left arm, Patient Position: Lying)   Pulse 67   Temp 37 °C (98.6 °F) (Temporal)   Resp 17   Ht 1.676 m (5' 5.98\")   Wt 47.7 kg (105 lb 1.6 oz)   LMP  (LMP Unknown)   SpO2 97%   BMI 16.97 kg/m²      Physical Exam  Constitutional:       General: She is not in acute distress.     Appearance: Normal appearance. She is not toxic-appearing.   HENT:      Head: Normocephalic and atraumatic.      Mouth/Throat:      Mouth: Mucous membranes are moist.   Eyes:      Pupils: Pupils are equal, round, and reactive to light.   Cardiovascular:      Rate and Rhythm: Normal rate and regular rhythm.      Heart sounds: No murmur heard.  Pulmonary:      Breath sounds: Normal breath sounds. No wheezing, rhonchi or rales.   Abdominal:      General: There is no distension.      Palpations: Abdomen is soft.   Musculoskeletal:      Right lower leg: No edema.      Left lower leg: No edema.   Neurological:      Mental Status: She is alert.      She is rigid in all extremities.  She does not make eye contact but will track.  She says few words.    Labs  BMP:  Results from last 7 days   Lab Units 11/06/24  1809   CREATININE mg/dL 0.46*   BUN mg/dL 32*   SODIUM mmol/L 138   POTASSIUM mmol/L 4.1   CHLORIDE mmol/L 105   CO2 mmol/L 25     CBC:  Results from last 7 days   Lab Units 11/06/24  1809   WBC AUTO x10*3/uL 8.3   HEMOGLOBIN g/dL 13.3   HEMATOCRIT % 40.7   MCV fL 96   PLATELETS AUTO x10*3/uL " 269     Coagulation:       Assesment and Plan    Sdh (Subdural Hematoma) (Multi)   Parkinson's Disease With Dyskinesia and Fluctuating Manifestations   Progressive Supranuclear Palsy (Multi)      Subdural hematoma treated conservatively.  Follow-up with the surgeon as requested.      Hypertension, to continue the lisinopril.     Anxiety, depression.  Has been on the SSRI, continue.     Progressive supranuclear palsy, Parkinson's disease.  Continue home meds.    Dysphagia - SLP to continue to see her; cont 1:1 feeding    Chaim Patterson MD

## 2024-11-08 PROCEDURE — 97542 WHEELCHAIR MNGMENT TRAINING: CPT | Mod: GP

## 2024-11-08 PROCEDURE — 1180000001 HC REHAB PRIVATE ROOM DAILY

## 2024-11-08 PROCEDURE — 97110 THERAPEUTIC EXERCISES: CPT | Mod: GP

## 2024-11-08 PROCEDURE — 97530 THERAPEUTIC ACTIVITIES: CPT | Mod: GO,CO

## 2024-11-08 PROCEDURE — 2500000001 HC RX 250 WO HCPCS SELF ADMINISTERED DRUGS (ALT 637 FOR MEDICARE OP): Performed by: INTERNAL MEDICINE

## 2024-11-08 PROCEDURE — 99232 SBSQ HOSP IP/OBS MODERATE 35: CPT | Performed by: PHYSICIAN ASSISTANT

## 2024-11-08 PROCEDURE — 97535 SELF CARE MNGMENT TRAINING: CPT | Mod: GO,CO

## 2024-11-08 PROCEDURE — 97116 GAIT TRAINING THERAPY: CPT | Mod: GP

## 2024-11-08 PROCEDURE — 92526 ORAL FUNCTION THERAPY: CPT | Mod: GN

## 2024-11-08 PROCEDURE — 97530 THERAPEUTIC ACTIVITIES: CPT | Mod: GP

## 2024-11-08 PROCEDURE — 92507 TX SP LANG VOICE COMM INDIV: CPT | Mod: GN

## 2024-11-08 ASSESSMENT — PAIN - FUNCTIONAL ASSESSMENT
PAIN_FUNCTIONAL_ASSESSMENT: 0-10
PAIN_FUNCTIONAL_ASSESSMENT: WONG-BAKER FACES

## 2024-11-08 ASSESSMENT — COGNITIVE AND FUNCTIONAL STATUS - GENERAL
TOILETING: A LOT
DAILY ACTIVITIY SCORE: 12
PERSONAL GROOMING: A LOT
DRESSING REGULAR LOWER BODY CLOTHING: A LOT
DRESSING REGULAR UPPER BODY CLOTHING: A LOT
EATING MEALS: A LOT
HELP NEEDED FOR BATHING: A LOT

## 2024-11-08 ASSESSMENT — PAIN SCALES - GENERAL
PAINLEVEL_OUTOF10: 0 - NO PAIN

## 2024-11-08 ASSESSMENT — ACTIVITIES OF DAILY LIVING (ADL)
HOME_MANAGEMENT_TIME_ENTRY: 30
HOME_MANAGEMENT_TIME_ENTRY: 15

## 2024-11-08 NOTE — NURSING NOTE
Patient has poor appetite. Patient has difficulty swallowing.  Patient was offered very very small sips and very small bites. Unable to get much intake. Patient herself was double thumbs down when I spoke with her considering PEG tube.

## 2024-11-08 NOTE — PROGRESS NOTES
Occupational Therapy       11/08/24 9351-4776   OT Last Visit   OT Received On 11/08/24   General   Referred By Dr. Patterson   Past Medical History Relevant to Rehab HTN, anxiety, dysphagia, Parkinson's, progressive supranuclear palsy, dystona   Missed Visit No   Family/Caregiver Present No   Prior to Session Communication Bedside nurse   Patient Position Received Bed, 3 rail up;Alarm on   Preferred Learning Style verbal;auditory   General Comment Agreeable to treatment. Pt answers yes/no questions with thumbs up/ thumbs down   Precautions   Hearing/Visual Limitations slowed smooth pursuits, saccades with limited eye movement especially past midline due to supranuclear palsy and resulting limited movement of eyes. Convergence/divergence absent. Must turn head to look past midline. Lower half of visual field appears to be limited. Has prism glasses at home.   Medical Precautions Fall precautions;Swallowing precautions   Precautions Comment puree diet and thin liquids   Pain Assessment   Pain Assessment 0-10   0-10 (Numeric) Pain Score 0 - No pain   Cognition   Arousal/Alertness Delayed responses to stimuli   Orientation Level Unable to assess   Following Commands Follows one step commands with increased time   Attention WFL   Insight WFL   Impulsive WFL   Processing Speed Delayed   Coordination   Movements are Fluid and Coordinated No   Upper Body Coordination rigidity noted   Lower Body Coordination rigidity noted   Trunk Coordination rigidity noted with decreased trunk flexion noted. Leans posterior with transfers.   Finger to Nose Bradykinesia   Rapid Alternating Movements Bradykinesia   Alternating Toe Taps Bradykinesia   RUE    RUE  X   LUE    LUE X   Feeding   Feeding Level of Assistance Moderate assistance   Feeding Where Assessed Wheelchair   Feeding Comments Mod A for assisting with scooping, bringing utensil/taking utensil out of mouth.   Grooming   Grooming Level of Assistance   (Varying levels depending on  task.)   Grooming Where Assessed Wheelchair   Grooming Comments Pt able to lotion BUE with set up assist. Mod A for washing face, oral swab Min A to assist taking out of mouth. Max A to comb hair.   UE Dressing   UE Dressing Level of Assistance Moderate assistance   UE Dressing Where Assessed Wheelchair   UE Dressing Comments [facility] gown, miko t-shirt.   LE Dressing   LE Dressing Yes   Pants Level of Assistance Moderate assistance  (Assist with bringing over hips while holding onto grab bars)   Shoe Level of Assistance Setup;Contact guard  (Assist with heel fully into shoes.)   Adult Briefs Level of Assistance Maximum assistance   LE Dressing Where Assessed Wheelchair   Bed Mobility   Bed Mobility Yes   Bed Mobility 1   Bed Mobility 1 Sitting to supine   Level of Assistance 1 Minimum assistance   Bed Mobility Comments 1 Able to bring both legs toward EOB, assist with trunk due to retropulsive tendencies.   Bed Mobility 2   Bed Mobility  2 Scooting   Level of Assistance 2 Minimum assistance   Bed Mobility Comments 2 Both toward EOB and in wheelchair.   Transfers   Transfer Yes   Transfer 1   Transfer From 1 Bed to   Transfer to 1 Wheelchair   Technique 1 Stand pivot   Transfer Device 1 Gait belt   Transfer Level of Assistance 1 Minimum assistance   Trials/Comments 1 Cues for sequencing and technique for safe transfer.   Transfers 2   Transfer From 2 Wheelchair to   Transfer to 2 Stand   Technique 2 Sit to stand;Stand to sit   Transfer Device 2 Gait belt  (Grab bars)   Transfer Level of Assistance 2 Minimum assistance   Trials/Comments 2 x2 trials, cues for use of grab bars and hand placement.   Static Standing Balance   Static Standing-Balance Support Bilateral upper extremity supported   Static Standing-Level of Assistance Contact guard  (Intermittent Min A)   Static Standing-Comment/Number of Minutes While holding onto grab bars   Therapeutic Activity   Therapeutic Activity Performed Yes   Therapeutic  Activity 1 Pt completed x3 sit<>stand transfers with Min A for steadying, balance and cues for hand placement and body positioning to increase independence in ADL/iADLs performance.   IP OT Assessment   OT Assessment Pt demo good progress with ADL and functional transfer goals.   Prognosis Fair   Barriers to Discharge None   Evaluation/Treatment Tolerance Patient tolerated treatment well   Medical Staff Made Aware Yes   End of Session Communication Bedside nurse   End of Session Patient Position Up in chair;Alarm off, not on at start of session   OT Assessment   OT Assessment Results Decreased ADL status;Decreased upper extremity range of motion;Decreased upper extremity strength;Decreased safe judgment during ADL;Decreased endurance;Visual deficit;Decreased fine motor control;Decreased functional mobility;Decreased gross motor control   Strengths Living arrangement secure   Barriers to Participation Comorbidities   Education   Individual(s) Educated Patient   Education Provided Fall precautons;Risk and benefits of OT discussed with patient or other;POC discussed and agreed upon   Risk and Benefits Discussed with Patient/Caregiver/Other yes   Patient/Caregiver Demonstrated Understanding yes   Plan of Care Discussed and Agreed Upon yes   Patient Response to Education Patient/Caregiver Performed Return Demonstration of Exercises/Activities;Patient/Caregiver Verbalized Understanding of Information   Inpatient/Swing Bed or Outpatient   Inpatient/Swing Bed or Outpatient Inpatient   Inpatient Plan   Treatment Interventions ADL retraining;Functional transfer training;UE strengthening/ROM;Endurance training;Equipment evaluation/education;Neuromuscular reeducation;Fine motor coordination activities;Compensatory technique education   OT Frequency 90 min/5 days per week   OT Recommended Transfer Status Assist of 2   OT - OK to Discharge Yes

## 2024-11-08 NOTE — NURSING NOTE
Pt went to therapy today, spouse ans son came for a visit and she ate 0-25% of meals. Most of the 25% was 100% of the protein shake.

## 2024-11-08 NOTE — PROGRESS NOTES
Occupational Therapy       11/08/24 9812-8498   OT Last Visit   OT Received On 11/08/24   General   Reason for Referral decreased mobility, SDH   Referred By Dr. Patterson   Past Medical History Relevant to Rehab HTN, anxiety, dysphagia, Parkinson's, progressive supranuclear palsy, dystona   Prior to Session Communication Bedside nurse   Patient Position Received Up in chair;Alarm off, not on at start of session   Preferred Learning Style verbal;auditory   General Comment Pt agreeable to skilled OT intervention     Precautions   Medical Precautions Fall precautions;Swallowing precautions   Precautions Comment puree diet and thin liquids   Pain Assessment   Pain Assessment 0-10     0-10 (Numeric) Pain Score 0 - No pain     Cognition   Arousal/Alertness Delayed responses to stimuli   Following Commands Follows one step commands with increased time   Cognition Comments communicates with thumbs up/thumbs down   Processing Speed Delayed   Functional Standing Tolerance   Time 1 minute   Activity transfer   Functional Standing Tolerance Comments BUE supported at Pts waist gait belt use   Bed Mobility   Bed Mobility Yes   Bed Mobility 1   Bed Mobility 1 Sitting to supine   Level of Assistance 1 Minimum assistance   Bed Mobility Comments 1 partial assist trunk down Pt advance BLE   Bed Mobility 2   Bed Mobility  2 Scooting   Level of Assistance 2 Minimum assistance   Bed Mobility Comments 2 supine to HOB Pt utilize BLE   Transfers   Transfer Yes   Transfer 1   Transfer From 1 Wheelchair to   Transfer to 1 Stand   Technique 1 Sit to stand;Stand to sit   Transfer Device 1 Gait belt   Transfer Level of Assistance 1 Minimum assistance;Moderate assistance   Trials/Comments 1 vc posture therapist grasp  BUE at waist     Dynamic Sitting Balance   Dynamic Sitting-Balance Support Feet supported   Dynamic Sitting-Level of Assistance Close supervision   Dynamic Sitting-Balance Forward lean;Reaching for objects;Reaching across  midline;Trunk control activities   Dynamic Sitting-Comments challenged pt with core engagement :reach rings to post and velcro board, BUE lora box all ask increase time however Pt highly motivated   Therapeutic Exercise   Therapeutic Exercise Performed Yes   Therapeutic Exercise Activity 1 BUE to grady AAROM in elbow extension 10 reps to increase BUE          IP OT Assessment   OT Assessment Pt progressing with established POC.  Will continue to address remaining deficits with skilled OT intervention.   Prognosis Fair   Barriers to Discharge None   Evaluation/Treatment Tolerance Patient tolerated treatment well   Medical Staff Made Aware Yes   End of Session Communication Bedside nurse   End of Session Patient Position Bed, 3 rail up;Alarm on  (call light in reach all needs met)   OT Assessment   OT Assessment Results Decreased ADL status;Decreased upper extremity range of motion;Decreased upper extremity strength;Decreased safe judgment during ADL;Decreased endurance;Visual deficit;Decreased fine motor control;Decreased functional mobility;Decreased gross motor control   Strengths Attitude of self;Living arrangement secure   Barriers to Participation Comorbidities   Inpatient/Swing Bed or Outpatient   Inpatient/Swing Bed or Outpatient Inpatient   Inpatient Plan   Treatment Interventions Functional transfer training;Endurance training;UE strengthening/ROM;Compensatory technique education   OT Frequency 5 times per week   Equipment Recommended upon Discharge Other (comment)  (tilt-in-space w/c with head support)   OT Recommended Transfer Status Assist of 1;Assist of 2   OT - OK to Discharge Yes

## 2024-11-08 NOTE — PROGRESS NOTES
Physical Therapy  Treatment Note        11/08/24 8516-7891   PT  Visit   PT Received On 11/08/24   Response to Previous Treatment Other (Comment)  (No verbalizations but used thumbs up/down b)   General   Reason for Referral decreased mobility, SDH   Referred By Dr. Patterson   Past Medical History Relevant to Rehab HTN, anxiety, dysphagia, Parkinson's, progressive supranuclear palsy, dystona   Prior to Session Communication Bedside nurse   Patient Position Received Up in chair;Alarm off, not on at start of session   Preferred Learning Style verbal;auditory   General Comment Pt agreeable to skilled OT intervention  (Simultaneous filing. User may not have seen previous data.)   Precautions   Medical Precautions Fall precautions;Swallowing precautions   Precautions Comment puree diet and thin liquids   Pain Assessment   Pain Assessment 0-10  (Simultaneous filing. User may not have seen previous data.)   0-10 (Numeric) Pain Score 0 - No pain  (Pt indicating with thumbs  Simultaneous filing. User may not have seen previous data.)   Cognition   Arousal/Alertness Delayed responses to stimuli   Following Commands Follows one step commands with increased time   Cognition Comments communicates with thumbs up/thumbs down   Processing Speed Delayed   General Observation   General Observation Pt very thin.  No Coversation.  Retropulsive of trunk in w/c.  CAn communicate with some gestures.   Therapeutic Activity   Therapeutic Activity 1 Incontinent of urine.  Assist for hygiene, pants.  Transfers x4 with bed rail pulling up.  Brief applied.  Pull up SAturated.   Bed Mobility   Bed Mobility Yes   Bed Mobility 1   Bed Mobility 1 Sitting to supine   Level of Assistance 1 Minimum assistance   Bed Mobility Comments 1 partial assist trunk down Pt advance BLE   Bed Mobility 2   Bed Mobility  2 Scooting   Level of Assistance 2 Minimum assistance   Bed Mobility Comments 2 supine to HOB Pt utilize BLE   Ambulation/Gait Training 1   Surface  1 Level tile   Device 1 Platform crutches right;Platform crutches left  (USTEP)   Gait Support Devices Gait belt;Wheelchair follow   Assistance 1 Moderate assistance   Quality of Gait 1 NBOS;Inconsistent stride length   Comments/Distance (ft) 1 55 feet Dystonia RW Retropulsive and walking on toes.   Ambulation/Gait Training 2   Surface 2 Level tile   Device 2 Rolling walker   Gait Support Devices Gait belt;Wheelchair follow   Assistance 2 Moderate assistance   Quality of Gait 2 Scissoring;Forward flexed posture  (Retropulsive)   Comments/Distance (ft) 2 50 feet Listing Right   Transfers   Transfer Yes   Transfer 1   Transfer From 1 Wheelchair to  (Simultaneous filing. User may not have seen previous data.)   Transfer to 1 Stand  (Simultaneous filing. User may not have seen previous data.)   Technique 1 Sit to stand;Stand to sit  (Simultaneous filing. User may not have seen previous data.)   Transfer Device 1 Gait belt  (Simultaneous filing. User may not have seen previous data.)   Transfer Level of Assistance 1 Minimum assistance;Moderate assistance  (Simultaneous filing. User may not have seen previous data.)   Trials/Comments 1 RW assist to nplace hands on RW handles  (x 2 Attempts  Simultaneous filing. User may not have seen previous data.)   PT Assessment   Assessment Comment Incontinent of Urine Saturated through clothes.  Toileting Schedule may be helpful.  Pt had Pull up on.   End of Session Patient Position Up in chair;Alarm off, not on at start of session   PT Plan   PT Recommended Transfer Status Assist x1   PT - OK to Discharge Yes

## 2024-11-08 NOTE — PROGRESS NOTES
IDT held this date to review patient progress and discharge plan. LSW met with patient's and spouse to confirm meeting with medical director 11.12.24 at 3:15pm. Discharge goal remains for return home and LSW is working to coordinate private duty aides and DME (request for purewick catheter). Patient was active with [facility] in past. ADOD is 11.19.24 and patient and family aware. No questions or concerns noted at this time.

## 2024-11-08 NOTE — PROGRESS NOTES
Speech-Language Pathology    SLP Adult [facility] Dysphagia and Speech-Language Pathology Treatment     Patient Name: Leidy Teixeira  MRN: [id number]  Today's Date: 11/8/2024  Time Calculation  Start Time: 1005  Stop Time: 1100  Time Calculation (min): 55 min     4/4sw +sp    Current Problem:   SDH with PMH significant for HTN, anxiety, dysphagia, Parkinson's, progressive supranuclear palsy, dystonia     SLP Assessment:  SLP TX Intervention Outcome: Other (Comment) (slow progress)  SLP Assessment Results: Expression deficits, Other (Comment) (dysphagia)  Prognosis: Good  Treatment Tolerance: Patient tolerated treatment well  Medical Staff Made Aware: Yes  Strengths: Family/Caregiver Support, Motivation  Barriers: Comorbidities     Plan:  Inpatient/Swing Bed or Outpatient: Inpatient  Treatment/Interventions: Communication functioning, Other (Comment) (dysphagia)  SLP TX Plan: Continue Plan of Care  SLP Plan: Skilled SLP  SLP Frequency: 4x per week  Duration: 2 weeks  SLP Discharge Recommendations: Other (Comment) (TBD pending progress)  Next Treatment Priority: diet tolerance, AAC  Discussed POC: Patient, Nursing  Discussed Risks/Benefits: Yes, Patient, Nursing  Patient/Caregiver Agreeable: Yes      Subjective   Pt seen upright in wheelchair. Staff assisted with positioning during session as pt appeared to get lower in w/c as session progressed.     General Visit Information:   Reason for Referral: Follow-up dysphagia and speech-language treatment  Referred By: Dr. Patterson  Past Medical History Relevant to Rehab: HTN, anxiety, dysphagia, Parkinson's, progressive supranuclear palsy, dystona  Prior to Session Communication: Bedside nurse    Pain Assessment:  Pain Assessment  0-10 (Numeric) Pain Score: 0 - No pain      Objective   DYSPHAGIA GOALS (established 11/5 , anticipated end 2 weeks):  1) Pt to tolerate recommended diet to 90% without overt s/s of aspiration in order to ensure pt is demonstrating adequate swallow  "function for meeting nutrition and hydration needs.   PROGRESS: Staff reported that pt tolerated 1 orange juice and bites of cereal for breakfast. Discussion with pt RE: concerns d/t limited p.o. intake and length of time for p.o. intake. Asked pt if she would consider alternate means such as PEG and pt promptly responded with a thumbs down for \"no.\"   STATUS: continue pureed, thin with 1:1 assist with feed and controlled sip drinks. PO intake only when pt is alert and not overly fatigued. Discussed with RN, staff.  2) Pt to demonstrate functional knowledge and use of compensatory swallowing strategies to 90% to reduce risk of aspiration and s/s dysphagia.  PROGRESS: Pt able to utilize dysphagia cup independently. Cues to swallow prior to next bite/sip. Pt had 2 sips of Ensure during tx session. Prolonged oral hold prior to onset of swallow.   STATUS: continue controlled sip drinks and cued swallow only when pt is fully alert and not fatigued.     LONG TERM GOAL: Patient will tolerate the least restrictive diet without overt difficulty or pulmonary compromise by time of discharge    Therapeutic Swallow:  Therapeutic Swallow Intervention : Compensatory Strategies, PO Trials  Solid Diet Recommendations: Pureed/extremely thick  (IDDSI Level 4)  Liquid Diet Recommendations: Thin (IDDSI Level 0)  Swallow Comments: medications crushed in applesauce    SPEECH-LANGUAGE GOALS (established 11/5, anticipate end 2 weeks):  Pt will accurately locate 4 icons on SGD.  PROGRESS: Pt was wearing prism glasses. Able to locate 2 icons on SGD.              STATUS: continue  2. Pt will utilize communication board/icons to express at least 1 want/need.  PROGRESS: Pt continues to primarily use thumbs up/down/sideways (maybe). Today she utilized 2 fingers for \"second choice.\" Encouraged pt to point or utilize stylus, however, pt felt more comfortable with thumbs up/down.              STATUS: continue  3. Ongoing assessment if indicated for " further goal development or to determine progress, as needed.  LTG: Pt will improve expressive communication for basic needs/wants.    Inpatient Education:  Adult Inpatient Education  Individual(s) Educated: Patient, Other (staff)  Verbal Education : dysphagia cup with controlled sips, feed only when fully awake and not overly fatigued  Risk and Benefits Discussed with Patient/Caregiver/Other: yes  Patient/Caregiver Demonstrated Understanding: yes  Plan of Care Discussed and Agreed Upon: yes  Patient Response to Education: Patient/Caregiver Verbalized Understanding of Information  Education Comment: Discussed with RN    Consultations/Referrals/Coordination of Services: Sent Secure Chat to Dr. Patterson to inform that pt declined alternative nutrition/hydration.

## 2024-11-08 NOTE — PROGRESS NOTES
Physical Therapy       11/08/24 6580-3017   PT  Visit   PT Received On 11/08/24   Response to Previous Treatment Patient reporting fatigue but able to participate.   General   Reason for Referral decreased mobility, SDH   Referred By Dr. Patterson   Past Medical History Relevant to Rehab HTN, anxiety, dysphagia, Parkinson's, progressive supranuclear palsy, dystona   Prior to Session Communication Bedside nurse   Patient Position Received Up in chair;Alarm off, not on at start of session   Preferred Learning Style verbal;auditory   General Comment Agreeable to treatment. Pt answers yes/no questions with thumbs up/ thumbs down   Precautions   Medical Precautions Fall precautions;Swallowing precautions   Precautions Comment puree diet and thin liquids   Pain Assessment   Pain Assessment 0-10   0-10 (Numeric) Pain Score 0 - No pain   Therapeutic Exercise   Therapeutic Exercise Activity 1 Omnicycle level 2 x 10 minutes, 3.1 km, 98% activity   Therapeutic Exercise Activity 2 Seated LAQ 2x15 2# BLE   Therapeutic Exercise Activity 3 hip adduction against ball 2x15   Therapeutic Exercise Activity 4 hamstsring curls 2x15 green band   Therapeutic Exercise Activity 5 hip abduction 2x15 green band   Balance/Neuromuscular Re-Education   Balance/Neuromuscular Re-Education Activity 1 STS 1 x 5 with cues for anterior weight shift when standing with good carryover and min assist. Cues for  knee and trunk flexion when siting with fair carryover and min assist and increased time   Ambulation/Gait Training 1   Surface 1 Level tile   Device 1   (B platform USTEP rolling walker)   Gait Support Devices Gait belt;Wheelchair follow   Assistance 1 Minimum assistance;Moderate assistance   Quality of Gait 1 NBOS;Inconsistent stride length   Comments/Distance (ft) 1 50 ft.  Turns included.  Vc's to weight shift forward to rest UE's on platform to reduce retropulsion. Initially, ambulates on toes at slow pace but progresses to reciprocal motion.    Transfer 1   Transfer From 1 Wheelchair to   Transfer to 1 Stand   Technique 1 Sit to stand;Stand to sit   Transfer Device 1 Gait belt   Transfer Level of Assistance 1 Minimum assistance   Trials/Comments 1 cues for safe hand placement   Transfers 2   Transfer From 2 Wheelchair to   Transfer to 2   (Tilt in space wheelchair)   Technique 2 Sit to stand;Stand to sit  (turn to sit taking about 6 steps)   Transfer Device 2   (no device)   Transfer Level of Assistance 2 Minimum assistance   Trials/Comments 2 patient is retropulsive   Wheelchair Activities   Propulsion Type 1 Manual   Level 1 Level tile   Method 1 Right upper extremity;Left upper extremity   Level of Assistance 1 Contact guard   Description/Details 1 70 ft includes turns, cues for direction and obstacle avoidance   Activity Tolerance   Endurance Tolerates 30 min exercise with multiple rests   PT Assessment   PT Assessment Results Decreased strength;Decreased range of motion;Decreased endurance;Impaired balance;Decreased mobility;Decreased coordination;Impaired vision;Impaired tone   Rehab Prognosis Good   Evaluation/Treatment Tolerance Patient tolerated treatment well   Strengths Living arrangement secure   Barriers to Participation Comorbidities   End of Session Communication Bedside nurse   Assessment Comment Rigidity noted throughout all mobility. Patient is able to participate in sessions with rest breaks for fatigue.   End of Session Patient Position Up in chair;Alarm off, not on at start of session  (tilt in space chair somewhat reclined)   PT Plan   Inpatient/Swing Bed or Outpatient Inpatient   PT Plan   Treatment/Interventions Transfer training;Gait training;Endurance training;Therapeutic exercise;Therapeutic activity;Postural re-education;Positioning   PT Recommended Transfer Status Assist x1   PT - OK to Discharge Yes

## 2024-11-08 NOTE — PROGRESS NOTES
"Leidy Teixeira is a 54 y.o. female on day 4 of admission presenting with SDH (subdural hematoma) (Multi).    Subjective   Pt with h/o progressive supranusclear palsy and parkinson's treated for a fall with a subdural hematoma.  Pt seen while sitting in wheelchair at the bedside.  Nursing is feeding pt her lunch.  PO intake has been poor.    Pt was seen in the emergency room for possible aspiration episode a couple of days ago.  CXR negative. No respiratory issues.  Lungs are clear.  No shortness of breath or cough No new issues per nursing.  She does not appear to be in pain.  She is participating in therapy.        Objective     Physical Exam  Constitutional:       General: She is not in acute distress.  HENT:      Head:      Comments: Laceration above left eye is scabbed over.  There is no redness or drainage noted.   Eyes:      Conjunctiva/sclera: Conjunctivae normal.      Pupils: Pupils are equal, round, and reactive to light.   Cardiovascular:      Rate and Rhythm: Normal rate and regular rhythm.      Heart sounds: No murmur heard.  Pulmonary:      Effort: Pulmonary effort is normal.      Breath sounds: No wheezing, rhonchi or rales.   Abdominal:      General: Abdomen is flat. Bowel sounds are normal. There is no distension.      Palpations: Abdomen is soft. There is no mass.      Tenderness: There is no abdominal tenderness.   Musculoskeletal:         General: No swelling.   Skin:     General: Skin is warm and dry.      Findings: No rash.   Neurological:      Mental Status: She is alert. Mental status is at baseline.         Last Recorded Vitals  Blood pressure 106/71, pulse 72, temperature 36.9 °C (98.4 °F), temperature source Temporal, resp. rate 17, height 1.676 m (5' 5.98\"), weight 47.7 kg (105 lb 1.6 oz), SpO2 98%.  Intake/Output last 3 Shifts:  No intake/output data recorded.    Relevant Results    No results found for this or any previous visit (from the past 24 hours).          "             Malnutrition Diagnosis Status: New  Malnutrition Diagnosis: Severe malnutrition related to chronic disease or condition  As Evidenced by: po intake </= 75% estimated needs for >/= 1 month, severe subcutaneous fat loss and muscle wasting  I agree with the dietitian's malnutrition diagnosis.      Assessment/Plan   Assessment & Plan  SDH (subdural hematoma) (Multi)    Parkinson's disease with dyskinesia and fluctuating manifestations    Progressive supranuclear palsy (Multi)      Subdural hematoma treated conservatively.  Follow-up with the surgeon as requested.       Hypertension, to continue the lisinopril.     Anxiety, depression.  Has been on the SSRI, continue.     Progressive supranuclear palsy, Parkinson's disease.  Continue home meds.     Dysphagia - SLP to continue to see her; On pureed diet with thin liquids. cont 1:1 feeding    Protein calorie malnutrition:  per nursing SLP has discussed feeding tube with pt and she has declined.  She does better with liquids and is taking supplements. She had labs yesterday which are stable.              Anabel Cheung PA-C

## 2024-11-09 PROCEDURE — 97530 THERAPEUTIC ACTIVITIES: CPT | Mod: GP,CQ

## 2024-11-09 PROCEDURE — 97535 SELF CARE MNGMENT TRAINING: CPT | Mod: GO,CO

## 2024-11-09 PROCEDURE — 97110 THERAPEUTIC EXERCISES: CPT | Mod: CO,GO

## 2024-11-09 PROCEDURE — 97116 GAIT TRAINING THERAPY: CPT | Mod: GP,CQ

## 2024-11-09 PROCEDURE — 97110 THERAPEUTIC EXERCISES: CPT | Mod: GP,CQ

## 2024-11-09 PROCEDURE — 1180000001 HC REHAB PRIVATE ROOM DAILY

## 2024-11-09 PROCEDURE — 2500000001 HC RX 250 WO HCPCS SELF ADMINISTERED DRUGS (ALT 637 FOR MEDICARE OP): Performed by: INTERNAL MEDICINE

## 2024-11-09 ASSESSMENT — PAIN SCALES - GENERAL
PAINLEVEL_OUTOF10: 2
PAINLEVEL_OUTOF10: 0 - NO PAIN
PAINLEVEL_OUTOF10: 6

## 2024-11-09 ASSESSMENT — PAIN - FUNCTIONAL ASSESSMENT
PAIN_FUNCTIONAL_ASSESSMENT: 0-10

## 2024-11-09 ASSESSMENT — ACTIVITIES OF DAILY LIVING (ADL)
HOME_MANAGEMENT_TIME_ENTRY: 60
BATHING_LEVEL_OF_ASSISTANCE: MAXIMUM ASSISTANCE
BATHING_WHERE_ASSESSED: SHOWER

## 2024-11-09 NOTE — PROGRESS NOTES
11/09/24 1330 to 1400   General   Reason for Referral decreased mobility, SDH   Referred By Dr. Patterson   Past Medical History Relevant to Rehab HTN, anxiety, dysphagia, Parkinson's, progressive supranuclear palsy, dystona   Patient Position Received   (tilt and space w/c)   Preferred Learning Style verbal;auditory   General Comment agreeable to tx   Pain Assessment   Pain Assessment 0-10   0-10 (Numeric) Pain Score 0 - No pain   Therapeutic Exercise   Therapeutic Exercise Performed Yes   Therapeutic Exercise Activity 1 UBE 1 min half forward half backwards to improve strength and endurance for Adls and Iadls   Therapeutic Exercise Activity 2 pre's 1 lb weight bilateral arms biceps, forearms. Anterior deltoid no wieght 10 reps against gravity assist to keep count and, very slow movements with encouragement, Each arm completed exercises individually.   Inpatient/Swing Bed or Outpatient   Inpatient/Swing Bed or Outpatient Inpatient   Inpatient Plan   Treatment Interventions UE strengthening/ROM   OT - OK to Discharge Yes

## 2024-11-09 NOTE — PROGRESS NOTES
Physical Therapy       11/09/24 8345-0645   PT  Visit   PT Received On 11/09/24   Response to Previous Treatment   (no verbalizations but uses thumbs up/down)   General   Reason for Referral decreased mobility, SDH   Referred By Dr. Patterson   Past Medical History Relevant to Rehab HTN, anxiety, dysphagia, Parkinson's, progressive supranuclear palsy, dystona   Patient Position Received Up in chair;Alarm off, not on at start of session   Preferred Learning Style verbal;auditory   General Comment agreeable to tx   Precautions   Hearing/Visual Limitations slowed smooth pursuits, saccades with limited eye movement especially past midline due to supranuclear palsy and resulting limited movement of eyes. Convergence/divergence absent. Must turn head to look past midline. Lower half of visual field appears to be limited. Has prism glasses at home.   Medical Precautions Fall precautions;Swallowing precautions   Precautions Comment puree diet and thin liquids   Pain Assessment   Pain Assessment 0-10   0-10 (Numeric) Pain Score 6   Pain Type Chronic pain   Pain Location Neck   Pain Orientation Right;Left   Therapeutic Exercise   Therapeutic Exercise Activity 1 seated hip abduction, blue theraband, 2x15 reps   Therapeutic Exercise Activity 2 Pt seated EOM with large ball between LEs and assisted with rolling ball side <> side <> forward <> retro for trunk disassociation and stretching.   Therapeutic Activity   Therapeutic Activity 1 gentle neck massage and stretching for discomfort and assisting head to neutral position.   Bed Mobility 1   Bed Mobility 1 Sitting to supine   Level of Assistance 1 Contact guard   Bed Mobility Comments 1 bed flat, support of trunk and positioning.   Bed Mobility 2   Bed Mobility  2 Supine to sitting   Level of Assistance 2 Minimum assistance   Bed Mobility Comments 2 HOB elevated, assist for trunk up/LE's down   Ambulation/Gait Training 1   Surface 1 Level tile   Device 1 No device   Gait Support  Devices Gait belt;Wheelchair follow   Assistance 1 Arm in arm assistance;Minimum assistance;Moderate assistance   Quality of Gait 1 NBOS;Inconsistent stride length   Comments/Distance (ft) 1 Hand held assist on B sides of pt used with good results, min to min/modA x 2 for assist with lateral weight shifting and reduction of retropulsion.  Pt has periods of freezing, requiring vc's to overcome.  Vc's also for WBOS and shorter step lengths.  65 ft x 1   Transfer 1   Transfer From 1 Bed to   Transfer to 1 Wheelchair   Technique 1 Stand pivot   Transfer Device 1 Gait belt   Transfer Level of Assistance 1 Minimum assistance   Trials/Comments 1 lead to left.  Pt grasped this PTA's forearms for support during transfer.  Pt retropulsive.   Transfers 2   Technique 2 Sit to stand;Stand to sit   Transfer Device 2 Gait belt   Transfer Level of Assistance 2 Minimum assistance   Trials/Comments 2 Difficulty with stand to sit transfer due to increased tone.  Pt retropulsive.   Other Activity   Other Activity 1 extra time spent assisting pt with washing face,   Other Activity 2 anti sling pad added to seat of w/c to improve pt's comfort, with good results reported.   Activity Tolerance   Endurance Tolerates 30 min exercise with multiple rests   PT Assessment   PT Assessment Results Decreased strength;Decreased range of motion;Decreased endurance;Impaired balance;Decreased mobility;Decreased coordination;Impaired vision;Impaired tone   Rehab Prognosis Good   Assessment Comment Gait training performed with bilateral HHA with good results noted.  Stand to sit transfers appear to be improving with ease of performance.   End of Session Patient Position Bed, 3 rail up;Alarm on   PT Plan   Inpatient/Swing Bed or Outpatient Inpatient   PT Plan   Treatment/Interventions Bed mobility;Transfer training;Gait training;Neuromuscular re-education;Therapeutic exercise;Therapeutic activity   PT Plan Ongoing PT   PT Recommended Transfer Status  Assist x1      Home, no PT needs **pending stair assessment

## 2024-11-09 NOTE — PROGRESS NOTES
Physical Therapy       11/09/24 9462-2753   PT  Visit   PT Received On 11/09/24   Response to Previous Treatment   (patient uses thumbs up/down/neutral to communicate)   General   Reason for Referral decreased mobility, SDH   Referred By Dr. Patterson   Past Medical History Relevant to Rehab HTN, anxiety, dysphagia, Parkinson's, progressive supranuclear palsy, dystona   Patient Position Received Up in chair   Pain Assessment   Pain Assessment 0-10   0-10 (Numeric) Pain Score 0 - No pain   Cognition   Arousal/Alertness Delayed responses to stimuli   Following Commands Follows one step commands with increased time   Postural Control   Head Control tight neck musculature with head held in right rotation, left sidebending and slight extension   Trunk Control rigid trunk with posterior lean   Posture Comment rigidity noted x 4 extemeties, neck and trunk   Ambulation/Gait Training 1   Surface 1 Level tile   Gait Support Devices Gait belt;Wheelchair follow   Assistance 1 Moderate assistance;Moderate tactile cues;Moderate verbal cues   Quality of Gait 1 NBOS;Inconsistent stride length;Ataxic;Scissoring   Comments/Distance (ft) 1 40' variable step length, right LE scissoring, posterior lean due to rigidity at trunk. Occasinal amb on toes but can correct with cues.   Transfer 1   Technique 1 Sit to stand;Stand to sit   Transfer Device 1 Gait belt   Transfer Level of Assistance 1 Minimum assistance;Minimal verbal cues;Minimal tactile cues   Trials/Comments 1 cues for hand placement and for anterior lean due to retro lean/rigidity   Transfers 2   Transfer From 2 Wheelchair to   Transfer to 2   (tilt in space wheelchair)   Technique 2 Stand pivot;To left   Transfer Device 2 Gait belt   Transfer Level of Assistance 2 Minimum assistance;Moderate verbal cues;Moderate assistance   Trials/Comments 2 step by step cues for safe technique   Other Activity   Other Activity 1 Omni cycle level 2 x 10 minutes, 98% activity, 2.5km   Activity  Tolerance   Endurance Tolerates 10 - 20 min exercise with multiple rests   PT Assessment   PT Assessment Results Decreased strength;Decreased range of motion;Decreased endurance;Impaired balance;Decreased mobility;Decreased coordination;Impaired vision;Impaired tone   Rehab Prognosis Good  (for goals listed)   Evaluation/Treatment Tolerance Patient tolerated treatment well   End of Session Patient Position   (tilt in space wheelchair in OT dept)   PT Plan   Inpatient/Swing Bed or Outpatient Inpatient   PT Plan   Treatment/Interventions Transfer training;Gait training;Neuromuscular re-education;Therapeutic exercise;Therapeutic activity   PT Plan Ongoing PT   PT Recommended Transfer Status Assist x1   PT - OK to Discharge Yes

## 2024-11-09 NOTE — PROGRESS NOTES
11/09/24 1100 to 1200   General   Reason for Referral decreased mobility, SDH   Referred By Dr. Patterson   Past Medical History Relevant to Rehab HTN, anxiety, dysphagia, Parkinson's, progressive supranuclear palsy, dystona   Patient Position Received Bed, 3 rail up;Alarm on   Preferred Learning Style verbal;auditory   General Comment agreeable to tx   Pain Assessment   Pain Assessment 0-10   0-10 (Numeric) Pain Score 0 - No pain   Feeding   Feeding Level of Assistance Maximum assistance   Feeding Where Assessed Bed level   Feeding Comments assist with feeding food brought to mouth. Aid reported patient not openning mouth   Grooming   Grooming Level of Assistance Dependent   Grooming Where Assessed Wheelchair   UE Bathing   UE Bathing Level of Assistance Dependent   UE Bathing Where Assessed Shower   LE Bathing   LE Bathing Level of Assistance Maximum assistance   LE Bathing Where Assessed Shower   UE Dressing   UE Dressing Level of Assistance Moderate assistance  (assist over head and pull down)   UE Dressing Where Assessed Wheelchair   LE Dressing   LE Dressing Yes   Pants Level of Assistance Maximum assistance  (assist with threading legs and pull up.)   Sock Level of Assistance Close supervision;Setup   Shoe Level of Assistance   (no shoes)   Adult Briefs Level of Assistance Maximum assistance   LE Dressing Where Assessed Wheelchair   Bed Mobility   Bed Mobility Yes   Bed Mobility 1   Bed Mobility 1 Supine to sitting   Level of Assistance 1 Minimum assistance   Bed Mobility Comments 1 assist with trunk and retropulsive   Bed Mobility 2   Bed Mobility  2 Sitting to supine   Level of Assistance 2 Minimum assistance   Bed Mobility Comments 2 assist with trunk   Transfers   Transfer Yes   Transfer 1   Transfer From 1 Bed to   Transfer to 1 Standing frame  (rolling shower chair)   Technique 1 Stand pivot   Transfer Device 1 Gait belt   Transfer Level of Assistance 1 Minimum assistance   Trials/Comments 1 cues for  handplacement and retropulsive in standing   Shower Transfers   Shower Transfer From Wheelchair   Shower Transfer Type To and from   Shower Transfer to   (rolling shower chair)   Shower Transfer Technique Stand pivot   Shower Transfers Moderate assistance  (min a on and off)   IP OT Assessment   OT Assessment Patient progressing towards established goals limited by fatigue. Will continue to address remaining deficits with skilled OT intervention   Prognosis Fair   Barriers to Discharge None   Evaluation/Treatment Tolerance Patient limited by fatigue   Medical Staff Made Aware Yes   End of Session Communication Bedside nurse   End of Session Patient Position Bed, 3 rail up;Alarm on   OT Assessment   OT Assessment Results Decreased ADL status;Decreased upper extremity range of motion;Decreased upper extremity strength;Decreased safe judgment during ADL;Decreased endurance;Visual deficit;Decreased fine motor control;Decreased functional mobility;Decreased gross motor control   Strengths Ability to acquire knowledge   Barriers to Participation Comorbidities   Education   Individual(s) Educated Patient   Education Provided   (patient educated in safe transfers)   Risk and Benefits Discussed with Patient/Caregiver/Other yes   Patient/Caregiver Demonstrated Understanding yes   Plan of Care Discussed and Agreed Upon yes   Inpatient/Swing Bed or Outpatient   Inpatient/Swing Bed or Outpatient Inpatient   Inpatient Plan   OT - OK to Discharge Yes

## 2024-11-10 VITALS
DIASTOLIC BLOOD PRESSURE: 80 MMHG | RESPIRATION RATE: 18 BRPM | HEIGHT: 66 IN | WEIGHT: 105.1 LBS | TEMPERATURE: 98.1 F | BODY MASS INDEX: 16.89 KG/M2 | OXYGEN SATURATION: 98 % | SYSTOLIC BLOOD PRESSURE: 113 MMHG | HEART RATE: 60 BPM

## 2024-11-10 PROCEDURE — 2500000001 HC RX 250 WO HCPCS SELF ADMINISTERED DRUGS (ALT 637 FOR MEDICARE OP): Performed by: INTERNAL MEDICINE

## 2024-11-10 PROCEDURE — 1180000001 HC REHAB PRIVATE ROOM DAILY

## 2024-11-10 ASSESSMENT — PAIN SCALES - GENERAL
PAINLEVEL_OUTOF10: 0 - NO PAIN
PAINLEVEL_OUTOF10: 2
PAINLEVEL_OUTOF10: 0 - NO PAIN

## 2024-11-10 ASSESSMENT — PAIN - FUNCTIONAL ASSESSMENT
PAIN_FUNCTIONAL_ASSESSMENT: 0-10
PAIN_FUNCTIONAL_ASSESSMENT: FLACC (FACE, LEGS, ACTIVITY, CRY, CONSOLABILITY)
PAIN_FUNCTIONAL_ASSESSMENT: 0-10

## 2024-11-10 ASSESSMENT — PAIN DESCRIPTION - DESCRIPTORS: DESCRIPTORS: PATIENT UNABLE TO DESCRIBE

## 2024-11-10 ASSESSMENT — ACTIVITIES OF DAILY LIVING (ADL): EFFECT OF PAIN ON DAILY ACTIVITIES: REST

## 2024-11-10 NOTE — NURSING NOTE
Pt repositioned for comfort. Off loading boots on. Pt offered nourishment and brief dry. Bed alarm in place for pt safety. Call light in reach.

## 2024-11-10 NOTE — CARE PLAN
The patient's goals for the shift include  sleep    The clinical goals for the shift include maintain safety; promote sleep

## 2024-11-11 PROCEDURE — 2500000001 HC RX 250 WO HCPCS SELF ADMINISTERED DRUGS (ALT 637 FOR MEDICARE OP): Performed by: INTERNAL MEDICINE

## 2024-11-11 PROCEDURE — 97535 SELF CARE MNGMENT TRAINING: CPT | Mod: GO,CO

## 2024-11-11 PROCEDURE — 97112 NEUROMUSCULAR REEDUCATION: CPT | Mod: GP

## 2024-11-11 PROCEDURE — 97530 THERAPEUTIC ACTIVITIES: CPT | Mod: GO,CO

## 2024-11-11 PROCEDURE — 1180000001 HC REHAB PRIVATE ROOM DAILY

## 2024-11-11 PROCEDURE — 97530 THERAPEUTIC ACTIVITIES: CPT | Mod: GP

## 2024-11-11 PROCEDURE — 97116 GAIT TRAINING THERAPY: CPT | Mod: GP

## 2024-11-11 PROCEDURE — 99232 SBSQ HOSP IP/OBS MODERATE 35: CPT | Performed by: INTERNAL MEDICINE

## 2024-11-11 PROCEDURE — 97110 THERAPEUTIC EXERCISES: CPT | Mod: GP

## 2024-11-11 ASSESSMENT — PAIN - FUNCTIONAL ASSESSMENT
PAIN_FUNCTIONAL_ASSESSMENT: 0-10
PAIN_FUNCTIONAL_ASSESSMENT: WONG-BAKER FACES
PAIN_FUNCTIONAL_ASSESSMENT: 0-10

## 2024-11-11 ASSESSMENT — PAIN SCALES - GENERAL
PAINLEVEL_OUTOF10: 0 - NO PAIN
PAINLEVEL_OUTOF10: 7
PAINLEVEL_OUTOF10: 5 - MODERATE PAIN
PAINLEVEL_OUTOF10: 7
PAINLEVEL_OUTOF10: 0 - NO PAIN

## 2024-11-11 ASSESSMENT — ACTIVITIES OF DAILY LIVING (ADL)
BATHING_WHERE_ASSESSED: BED LEVEL
HOME_MANAGEMENT_TIME_ENTRY: 60
BATHING_LEVEL_OF_ASSISTANCE: MODERATE ASSISTANCE

## 2024-11-11 NOTE — PROGRESS NOTES
Occupational Therapy       11/11/24 7720-1143   OT Last Visit   OT Received On 11/11/24   General   Reason for Referral decreased mobility, SDH   Referred By Dr. Patterson   Past Medical History Relevant to Rehab HTN, anxiety, dysphagia, Parkinson's, progressive supranuclear palsy, dystona   Prior to Session Communication Bedside nurse   Preferred Learning Style verbal;auditory   General Comment agreeable to tx   Precautions   Hearing/Visual Limitations slowed smooth pursuits, saccades with limited eye movement especially past midline due to supranuclear palsy and resulting limited movement of eyes. Convergence/divergence absent. Must turn head to look past midline. Lower half of visual field appears to be limited. Has prism glasses at home.   Medical Precautions Fall precautions;Swallowing precautions   Precautions Comment puree diet and thin liquids   Pain Assessment   Pain Assessment 0-10   0-10 (Numeric) Pain Score 7   Pain Type Acute pain   Pain Location Buttocks   Cognition   Arousal/Alertness Delayed responses to stimuli   Following Commands Follows all commands and directions without difficulty   Cognition Comments patient consistently responds appropriately to yes/no questions using thumbs up/thumbs/down. Limited ability to talk due to rigid facial musculature   Processing Speed Delayed   Coordination   Upper Body Coordination rigidity noted   Lower Body Coordination rigidity noted   Trunk Coordination rigidity noted with decreased trunk flexion noted. Leans posterior with transfers.   Grooming   Grooming Level of Assistance Setup   Grooming Where Assessed Wheelchair   Grooming Comments Pt was able to wash face and perform oral care while seated with set-up.   UE Bathing   UE Bathing Level of Assistance Moderate assistance   UE Bathing Where Assessed Wheelchair   UE Bathing Comments Mod assist with UB bathing- additional time. Mod assist for throughness.   LE Bathing   LE Bathing Level of Assistance Moderate  assistance   LE Bathing Where Assessed Bed level   LE Bathing Comments With additional time, pt able to wash anterior periarea and use LH sponge for tops of LE's. Assist for buttocks and below knee bathing.   UE Dressing   UE Dressing Level of Assistance Minimum assistance   UE Dressing Where Assessed Wheelchair   UE Dressing Comments With additional time, pt was able to don/doff pulloover shirt.   LE Dressing   Pants Level of Assistance Moderate assistance   Sock Level of Assistance Close supervision   Adult Briefs Level of Assistance Moderate assistance   LE Dressing Where Assessed Bed level   LE Dressing Comments Pt was able to don pants and underwear while supine in bed via figure 4 technique. Pt was able to bridge hips to hike over hips in bed. Assist with initial threading over feet and clothing orientation. Min assist for pants over all adjustment.   Bed Mobility 1   Bed Mobility 1 Rolling right;Rolling left   Level of Assistance 1 Minimum assistance   Bed Mobility Comments 1 Performed multiple rolling during ADL's. Initally required min assist for rolling. Then advanced to CGA.   Bed Mobility 2   Bed Mobility  2 Supine to sitting   Level of Assistance 2 Minimum assistance   Bed Mobility Comments 2 HOB elevated with rails and additional time required.   Transfer 1   Transfer From 1 Bed to   Transfer to 1 Wheelchair   Technique 1 Stand pivot   Transfer Device 1 Gait belt   Transfer Level of Assistance 1 Minimum assistance   Trials/Comments 1 Cueing for body and feet advancement.   IP OT Assessment   OT Assessment Patient progressing towards established goals limited by fatigue. Will continue to address remaining deficits with skilled OT intervention   Prognosis Fair   Barriers to Discharge None   Evaluation/Treatment Tolerance Patient limited by fatigue   Medical Staff Made Aware Yes   End of Session Communication Bedside nurse   End of Session Patient Position Up in chair;Alarm on   OT Assessment   OT  Assessment Results Decreased ADL status;Decreased upper extremity range of motion;Decreased upper extremity strength;Decreased safe judgment during ADL;Decreased endurance;Visual deficit;Decreased fine motor control;Decreased functional mobility;Decreased gross motor control   Strengths Ability to acquire knowledge   Barriers to Participation Comorbidities   Education   Individual(s) Educated Patient   Education Provided Fall precautons;Risk and benefits of OT discussed with patient or other;POC discussed and agreed upon   Home Program AROM;Strengthening;Handout issued   Patient Response to Education Patient/Caregiver Verbalized Understanding of Information   Inpatient/Swing Bed or Outpatient   Inpatient/Swing Bed or Outpatient Inpatient   Inpatient Plan   Treatment Interventions ADL retraining;Functional transfer training;Endurance training   OT Frequency 5 times per week   OT Discharge Recommendations High intensity level of continued care   OT Recommended Transfer Status Assist of 1   OT - OK to Discharge Yes

## 2024-11-11 NOTE — NURSING NOTE
Assumed care of patient at report. Pt sitting up in bed eating breakfast with assist. She does not display any signs of distress at this time. Will monitor for any changes during shift

## 2024-11-11 NOTE — PROGRESS NOTES
Physical Therapy       11/11/24 6843-0332   PT  Visit   PT Received On 11/11/24   Response to Previous Treatment   (mainly communicates with thumbs up/down/neutral)   General   Reason for Referral decreased mobility, SDH   Referred By Dr. Patterson   Past Medical History Relevant to Rehab HTN, anxiety, dysphagia, Parkinson's, progressive supranuclear palsy, dystona   Preferred Learning Style verbal;auditory   General Comment agreeable to tx   Precautions   Hearing/Visual Limitations slowed smooth pursuits, saccades with limited eye movement especially past midline due to supranuclear palsy and resulting limited movement of eyes. Convergence/divergence absent. Must turn head to look past midline. Lower half of visual field appears to be limited. Has prism glasses but she does not like to wear them   Medical Precautions Fall precautions;Swallowing precautions   Precautions Comment puree diet and thin liquids   Vital Signs   Vitals Session During PT   /80   BP Location Right arm   BP Method Manual  (nursing performed)   Patient Position Sitting   Vital Signs Comment patient had complained of dizziness with amb that resolved with sitting   Pain Assessment   Pain Assessment 0-10   0-10 (Numeric) Pain Score 7   Pain Type Acute pain   Pain Location Buttocks   Pain Interventions   (nursing notifeied and will assess skin under mepilex after therapy. Placed pressure  relieving cushion in chair.)   Cognition   Arousal/Alertness Delayed responses to stimuli   Following Commands Follows one step commands with increased time   Cognition Comments patient consistently responds appropriately to yes/no questions using thumbs up/thumbs/down. Limited ability to talk due to rigid facial musculature   Processing Speed Delayed   Coordination   Upper Body Coordination rigidity noted   Lower Body Coordination rigidity noted   Trunk Coordination rigidity noted with decreased trunk flexion noted. Leans posterior with transfers.   Postural  Control   Postural Control Impaired   Head Control tight neck musculature with head held in right rotation, left sidebending and slight extension   Posture Comment rigidity noted x 4 extemeties, neck and trunk   Therapeutic Exercise   Therapeutic Exercise Activity 1 SAQ 2 x 15, 2.5#   Therapeutic Exercise Activity 2 bridging 2 x 15 without roll   Therapeutic Exercise Activity 3 supine hip abd/add 2 x 15, 2.5#   Therapeutic Exercise Activity 4 supine heel slides 2 x 15, 2.5#   Therapeutic Exercise Activity 5 hooklying trunk rotation with LEs on green swiss ball rotates 15 repes each direction   Therapeutic Exercise Activity 6 patient seated edge of mat with large swiss ball in front of her, rolling ball forward/backward and to left and right x 15 reps each direction CGA/min assist due to rigidity of trunk   Balance/Neuromuscular Re-Education   Balance/Neuromuscular Re-Education Activity 1 FIST 22/56   Bed Mobility 1   Bed Mobility 1 Rolling right;Rolling left;Sitting to supine   Level of Assistance 1 Close supervision   Bed Mobility 2   Bed Mobility  2 Supine to sitting   Level of Assistance 2 Contact guard   Bed Mobility Comments 2 with rail, increased timne and step by step cues   Ambulation/Gait Training 1   Surface 1 Level tile   Gait Support Devices Gait belt;Wheelchair follow   Assistance 1 Moderate assistance;Moderate tactile cues;Moderate verbal cues   Quality of Gait 1 NBOS;Inconsistent stride length;Ataxic;Scissoring   Comments/Distance (ft) 1 Amb with ustep walker 40' variable step length, right LE scissoring, posterior lean due to rigidity at trunk. Occasinal amb on toes but can correct with cues.   Transfer 1   Technique 1 Sit to stand;Stand to sit   Transfer Device 1 Gait belt   Transfer Level of Assistance 1 Minimum assistance   Trials/Comments 1 cues for hand placement, for anterior weight shift due to posterior lean, and for feet bflat on floor as patient tends to stand on toes.   Transfers 2    Transfer From 2 Wheelchair to;Mat to   Transfer to 2 Mat;Wheelchair   Technique 2 Stand pivot;To right;To left   Transfer Device 2 Gait belt   Transfer Level of Assistance 2 Minimum assistance   Trials/Comments 2 step by step cues for technique    Object From Floor   Comments unsafe   Activity Tolerance   Endurance Tolerates 10 - 20 min exercise with multiple rests   PT Assessment   PT Assessment Results Decreased strength;Decreased range of motion;Decreased endurance;Impaired balance;Decreased mobility;Decreased coordination;Impaired vision;Impaired tone   Rehab Prognosis Good  (for goals listed)   Evaluation/Treatment Tolerance Patient limited by pain   End of Session Communication Bedside nurse   Assessment Comment Patient progressing slowly towatds goals. Persistant trunk rigidity noted and delayed motor planning. Anticipate need for 24 hour assist at home. Recommend tilt in space wheelchair at home and ramp at home entry. Would benefit from family training prior to home going.   End of Session Patient Position   (tilt in space wheelchair in tilted/reclines position)   PT Plan   Inpatient/Swing Bed or Outpatient Inpatient   PT Plan   Treatment/Interventions Bed mobility;Transfer training;Gait training;Strengthening   PT Plan Ongoing PT   Equipment Recommended upon Discharge   (specialty wheelchair and ramp at home entry)   PT Recommended Transfer Status Assist x1   PT - OK to Discharge Yes

## 2024-11-11 NOTE — PROGRESS NOTES
Physical Therapy Treatment       11/11/24 7773-5530   PT  Visit   PT Received On 11/11/24   Response to Previous Treatment Other (Comment)   General   Reason for Referral decreased mobility, SDH   Referred By Dr. Patterson   Past Medical History Relevant to Rehab HTN, anxiety, dysphagia, Parkinson's, progressive supranuclear palsy, dystona   General Comment agreeable to tx   Pain Assessment   Pain Assessment FACES   0-10 (Numeric) Pain Score 5 - Moderate pain  (Pictorial of Pain Scale pt points to #)   Ambulation/Gait Training 1   Surface 1 Level tile   Device 1   (USTEP WALKER)   Gait Support Devices Gait belt   Assistance 1 Moderate assistance;Moderate tactile cues;Moderate verbal cues   Quality of Gait 1 Scissoring;Ataxic  (Hip Extension with retropulsion.  Pt freezes then assist to restart for ambulation.)   Comments/Distance (ft) 1 10, 35 feet but slow effortful with increased scizzoring, Rise to toes.  Assist to steer USTEP walker and begin propulsion. Close w/c [place]/  Vitals checked 109/79 HR 90 Pulse 02 99%   Transfer 1   Transfer From 1 Wheelchair to   Transfer to 1 Stand   Technique 1 Sit to stand;Stand to sit   Transfer Device 1 Gait belt   Transfer Level of Assistance 1 Minimum assistance;+2   Trials/Comments 1 USTEP Walker.  Hnads placed on .  Retropulsive with stand and Tone lifting BLE up to toes.   Other Activity   Other Activity 1 Omnicycle x 15 min 99% activity   Activity Tolerance   Endurance Tolerates 10 - 20 min exercise with multiple rests   PT Plan   Treatment/Interventions Bed mobility;Transfer training;Gait training;Stair training;Balance training;Strengthening;Endurance training;Therapeutic exercise   PT Recommended Transfer Status Assist x1   PT - OK to Discharge Yes

## 2024-11-11 NOTE — PROGRESS NOTES
"[facility]  Physician Progress Note    Subjective   Leidy Teixeira is a 54 y.o. female admitted to inpatient rehabilitation unit.    No new issues - about the same.      Objective   /83 (BP Location: Right arm, Patient Position: Lying)   Pulse 60   Temp 37 °C (98.6 °F) (Temporal)   Resp 16   Ht 1.676 m (5' 5.98\")   Wt 47.7 kg (105 lb 1.6 oz)   LMP  (LMP Unknown)   SpO2 98%   BMI 16.97 kg/m²      Physical Exam  Constitutional:       General: She is not in acute distress.     Appearance: Normal appearance. She is not toxic-appearing.   HENT:      Head: Normocephalic and atraumatic.      Mouth/Throat:      Mouth: Mucous membranes are moist.   Eyes:      Pupils: Pupils are equal, round, and reactive to light.   Cardiovascular:      Rate and Rhythm: Normal rate and regular rhythm.      Heart sounds: No murmur heard.  Pulmonary:      Breath sounds: Normal breath sounds. No wheezing, rhonchi or rales.   Abdominal:      General: There is no distension.      Palpations: Abdomen is soft.   Musculoskeletal:      Right lower leg: No edema.      Left lower leg: No edema.   Neurological:      Mental Status: She is alert.      She is rigid in all extremities.  She does not make eye contact but will track.  She says few words.    Labs  BMP:  Results from last 7 days   Lab Units 11/06/24  1809   CREATININE mg/dL 0.46*   BUN mg/dL 32*   SODIUM mmol/L 138   POTASSIUM mmol/L 4.1   CHLORIDE mmol/L 105   CO2 mmol/L 25     CBC:  Results from last 7 days   Lab Units 11/06/24  1809   WBC AUTO x10*3/uL 8.3   HEMOGLOBIN g/dL 13.3   HEMATOCRIT % 40.7   MCV fL 96   PLATELETS AUTO x10*3/uL 269     Coagulation:       Assesment and Plan    Sdh (Subdural Hematoma) (Multi)   Parkinson's Disease With Dyskinesia and Fluctuating Manifestations   Progressive Supranuclear Palsy (Multi)      Subdural hematoma treated conservatively.  Follow-up with the surgeon as requested.  "     Hypertension, to continue the lisinopril.     Anxiety, depression.  Has been on the SSRI, continue.     Progressive supranuclear palsy, Parkinson's disease.  Continue home meds.    Dysphagia - SLP to continue to see her; cont 1:1 feeding    Meeting w/  tomorrow re: PEG discussion    Chaim Patterson MD

## 2024-11-11 NOTE — PROGRESS NOTES
"Occupational Therapy       11/11/24 7251-3459   OT Last Visit   OT Received On 11/11/24   General   Reason for Referral decreased mobility, SDH   Referred By Dr. Patterson   Past Medical History Relevant to Rehab HTN, anxiety, dysphagia, Parkinson's, progressive supranuclear palsy, dystona   General Comment agreeable to tx  (Fatigue this PM session.)   Precautions   Hearing/Visual Limitations slowed smooth pursuits, saccades with limited eye movement especially past midline due to supranuclear palsy and resulting limited movement of eyes. Convergence/divergence absent. Must turn head to look past midline. Lower half of visual field appears to be limited. Has prism glasses but she does not like to wear them   Medical Precautions Fall precautions;Swallowing precautions   Precautions Comment puree diet and thin liquids   Pain Assessment   Pain Assessment 0-10   0-10 (Numeric) Pain Score 0 - No pain   Grooming   Grooming Level of Assistance Setup   Grooming Where Assessed Wheelchair   Grooming Comments To wash face after lunch   Car Transfers   Car Transfer From Walker   Car Transfer Technique Stand pivot;To right;To left   Car Transfers Maximal assistance   Car Transfers Comments Simulated car transfer. Additional time required. Mod/max assist with and without FWW to stand pivot from chair to \"car seat\". Fatigued this PM.   Therapeutic Exercise   Therapeutic Exercise Activity 1 Seated arm bike to increase overall function and strength. Able to perform x2 mins.   Therapeutic Activity   Therapeutic Activity 1 Seated wire hand maze with B/L UE to increase overall function and ROM.   IP OT Assessment   OT Assessment Patient progressing towards established goals limited by fatigue. Will continue to address remaining deficits with skilled OT intervention   Prognosis Fair   Barriers to Discharge None   Evaluation/Treatment Tolerance Patient limited by fatigue   Medical Staff Made Aware Yes   End of Session Communication Bedside " nurse   End of Session Patient Position Up in chair;Alarm on   OT Assessment   OT Assessment Results Decreased ADL status;Decreased upper extremity range of motion;Decreased upper extremity strength;Decreased safe judgment during ADL;Decreased endurance;Visual deficit;Decreased fine motor control;Decreased functional mobility;Decreased gross motor control   Strengths Ability to acquire knowledge   Barriers to Participation Comorbidities   Education   Individual(s) Educated Patient   Education Provided Fall precautons;Risk and benefits of OT discussed with patient or other;POC discussed and agreed upon   Home Program AROM;Strengthening;Handout issued   Patient Response to Education Patient/Caregiver Verbalized Understanding of Information   Inpatient/Swing Bed or Outpatient   Inpatient/Swing Bed or Outpatient Inpatient   Inpatient Plan   Treatment Interventions Functional transfer training;UE strengthening/ROM;Endurance training   OT Frequency 5 times per week   OT Discharge Recommendations High intensity level of continued care   OT Recommended Transfer Status Assist of 1   OT - OK to Discharge Yes

## 2024-11-12 PROBLEM — E43 SEVERE MALNUTRITION (MULTI): Status: ACTIVE | Noted: 2024-11-12

## 2024-11-12 PROCEDURE — 2500000001 HC RX 250 WO HCPCS SELF ADMINISTERED DRUGS (ALT 637 FOR MEDICARE OP): Performed by: INTERNAL MEDICINE

## 2024-11-12 PROCEDURE — 97530 THERAPEUTIC ACTIVITIES: CPT | Mod: GP

## 2024-11-12 PROCEDURE — 92507 TX SP LANG VOICE COMM INDIV: CPT | Mod: GN

## 2024-11-12 PROCEDURE — 1180000001 HC REHAB PRIVATE ROOM DAILY

## 2024-11-12 PROCEDURE — 97110 THERAPEUTIC EXERCISES: CPT | Mod: GP

## 2024-11-12 PROCEDURE — 99232 SBSQ HOSP IP/OBS MODERATE 35: CPT | Performed by: INTERNAL MEDICINE

## 2024-11-12 PROCEDURE — 2500000005 HC RX 250 GENERAL PHARMACY W/O HCPCS: Performed by: INTERNAL MEDICINE

## 2024-11-12 PROCEDURE — 97530 THERAPEUTIC ACTIVITIES: CPT | Mod: GO,CO

## 2024-11-12 PROCEDURE — 97112 NEUROMUSCULAR REEDUCATION: CPT | Mod: GP

## 2024-11-12 PROCEDURE — 97535 SELF CARE MNGMENT TRAINING: CPT | Mod: GO,CO

## 2024-11-12 PROCEDURE — 92526 ORAL FUNCTION THERAPY: CPT | Mod: GN

## 2024-11-12 PROCEDURE — 97116 GAIT TRAINING THERAPY: CPT | Mod: GP

## 2024-11-12 RX ORDER — ERYTHROMYCIN 5 MG/G
1 OINTMENT OPHTHALMIC 3 TIMES DAILY
Status: COMPLETED | OUTPATIENT
Start: 2024-11-12 | End: 2024-11-19

## 2024-11-12 ASSESSMENT — COGNITIVE AND FUNCTIONAL STATUS - GENERAL
EATING MEALS: A LOT
HELP NEEDED FOR BATHING: A LITTLE
DAILY ACTIVITIY SCORE: 14
PERSONAL GROOMING: A LOT
DRESSING REGULAR UPPER BODY CLOTHING: A LITTLE
DRESSING REGULAR LOWER BODY CLOTHING: A LOT
TOILETING: A LOT

## 2024-11-12 ASSESSMENT — PAIN SCALES - GENERAL
PAINLEVEL_OUTOF10: 0 - NO PAIN

## 2024-11-12 ASSESSMENT — ACTIVITIES OF DAILY LIVING (ADL)
HOME_MANAGEMENT_TIME_ENTRY: 60
BATHING_WHERE_ASSESSED: BED LEVEL
BATHING_LEVEL_OF_ASSISTANCE: MINIMUM ASSISTANCE

## 2024-11-12 ASSESSMENT — PAIN - FUNCTIONAL ASSESSMENT
PAIN_FUNCTIONAL_ASSESSMENT: 0-10

## 2024-11-12 NOTE — CARE PLAN
Problem: ADLs LTM Goal  Goal: Patient will perform UB and LB bathing seated with contact guard assist level of assistance and grab bars and shower chair.  Outcome: Progressing  Goal: Patient with complete lower body dressing with contact guard assist  level of assistance donning and doffing all LE clothes  with PRN adaptive equipment while supported sitting.  Outcome: Progressing  Goal: Patient will feed self with supervision level of assistance and verbal cues using PRN adaptive equipment.  Outcome: Progressing  Goal: Patient will complete daily grooming tasks brushing teeth and washing face/hair with supervision level of assistance and PRN adaptive equipment while supported sitting.  Outcome: Progressing  Goal: Patient will complete toileting including clothing management/hygiene with contact guard assist  level of assistance and grab bars and bedside commode.  Outcome: Progressing     Problem: TRANSFERS LTM Goals  Goal: Patient will complete functional transfer to toilet with front wheeled walker with contact guard assist  level of assistance.  Outcome: Progressing  Goal: Patient will complete functional car transfer with front wheeled walker with contact guard assist level of assistance.  Outcome: Progressing

## 2024-11-12 NOTE — PROGRESS NOTES
"Nutrition Follow up Note    Nutrition Assessment      Dr. Patterson meeting with family today to discuss PEG. Supplemental enteral feeds is an appropriate option if family and pt are in agreement. PO intake is inadequate to meet estimated needs and pt meets criteria for severe chronic malnutrition.     Nutrition History:  Food and Nutrient History: po intake averages 30% over the past 10 meals documented. drinking ensure as ordered with assistance from staff.  Energy Intake: Poor < 50 %    Anthropometrics:  Ht: 167.6 cm (5' 5.98\"), Wt: (!) 44.3 kg (97 lb 11.5 oz), BMI: 16.26  IBW/kg (Dietitian Calculated): 59.09 kg  Percent of IBW: 81 %       Weight Change:  Daily Weight  11/11/24 : (!) 44.3 kg (97 lb 11.5 oz)  11/06/24 : 45.5 kg (100 lb 4.8 oz)  11/04/24 : 47.7 kg (105 lb 1.6 oz)  09/14/24 : 104#  06/18/24 : 110#      Weight History / % Weight Change: additional wts obtained via chart review. pt unable to provide info regarding wt hx. possible 5# (4.5%) wt loss over ~5 months (6/18-11/4)  Significant Weight Loss: No (not significant yet undesirable)    Nutrition Focused Physical Exam Findings: assessed 11/6/24  Subcutaneous Fat Loss  Orbital Fat Pads: Severe (dark circles, hollowing and loose skin) (scab and bruising to L eye/face)  Buccal Fat Pads: Severe (hollow, sunken and narrow face)    Muscle Wasting  Temporalis: Severe (hollowed scooping depression)  Pectoralis (Clavicular Region): Severe (protruding prominent clavicle)  Deltoid/Trapezius: Severe (squared shoulders, acromion process prominent)  Interosseous: Severe (depressed area between thumb and forefinger)    Nutrition Significant Labs:  Lab Results   Component Value Date    WBC 8.3 11/06/2024    HGB 13.3 11/06/2024    HCT 40.7 11/06/2024     11/06/2024    ALT 20 11/06/2024    AST 15 11/06/2024     11/06/2024    K 4.1 11/06/2024     11/06/2024    CREATININE 0.46 (L) 11/06/2024    BUN 32 (H) 11/06/2024    CO2 25 11/06/2024     Nutrition " Specific Medications:  dextromethorphan-quinidine, 1 capsule, oral, BID  escitalopram, 10 mg, oral, Daily  glycopyrrolate, 1 mg, oral, Daily  lisinopril, 10 mg, oral, Daily  pramipexole, 0.5 mg, oral, Nightly      Dietary Orders (From admission, onward)       Start     Ordered    11/11/24 0844  Oral nutritional supplements  Until discontinued        Question Answer Comment   Deliver with All meals    Select supplement: Ensure Compact        11/11/24 0843    11/06/24 1519  Oral nutritional supplements  Until discontinued        Comments: Or equivalent ensure product   Question Answer Comment   Deliver with All meals    Select supplement: Ensure Compact        11/06/24 1518    11/05/24 1106  Adult diet Regular; Pureed 4; Thin 0; 1:1 Feeding  Diet effective now        Comments: Upright position, controlled sips, small bites/sips, eat/feed slowly   Question Answer Comment   Diet type Regular    Texture Pureed 4    Fluid consistency Thin 0    Select tray type: 1:1 Feeding        11/05/24 1106    11/04/24 2331  May Participate in Room Service With Assistance  ( ROOM SERVICE MAY PARTICIPATE WITH ASSISTANCE)  Once        Question:  .  Answer:  Yes    11/04/24 2330                  Estimated Needs:   Estimated Energy Needs  Total Energy Estimated Needs (kCal): 1432 kCal  Total Estimated Energy Need per Day (kCal/kg): 30 kCal/kg  Method for Estimating Needs: actual wt    Estimated Protein Needs  Total Protein Estimated Needs (g): 57 g  Total Protein Estimated Needs (g/kg): 1.2 g/kg  Method for Estimating Needs: actual wt    Estimated Fluid Needs  Method for Estimating Needs: 1 ml/kcal        Nutrition Diagnosis   Nutrition Diagnosis:  Malnutrition Diagnosis  Patient has Malnutrition Diagnosis: Yes  Diagnosis Status: Ongoing  Malnutrition Diagnosis: Severe malnutrition related to chronic disease or condition  As Evidenced by: po intake </= 75% estimated needs for >/= 1 month, severe subcutaneous fat loss and muscle wasting        Nutrition Interventions/Recommendations   Nutrition Interventions and Recommendations:    Nutrition Prescription:  Individualized Nutrition Prescription Provided for : 1432 kcals and 57g protein to be provided via diet and supplements    Nutrition Interventions:   Food and/or Nutrient Delivery Interventions  Interventions: Meals and snacks, Enteral intake  Meals and Snacks: Texture-modified diet  Goal: provide per SLP recs  Enteral Intake: Insert enteral feeding tube  Goal: can provide enteral feeding recs if plan for PEG placement  Medical Food Supplement: Commercial beverage  Goal: ensure compact TID to provide 220 kcals and 9g protein each. if unavailable, provide equivalent ensure product.    Education Documentation  No documentation found.           Nutrition Monitoring and Evaluation   Monitoring/Evaluation:   Food/Nutrient Related History Monitoring  Monitoring and Evaluation Plan: Energy intake  Energy Intake: Estimated energy intake  Criteria: pt to consume >/= 75% estimated needs    Body Composition/Growth/Weight History  Monitoring and Evaluation Plan: Weight  Weight: Measured weight  Criteria: pt will maintain/gain wt       Time Spent/Follow-up:   Follow Up  Time Spent (min): 30 minutes  Last Date of Nutrition Visit: 11/12/24  Nutrition Follow-Up Needed?: 5-7 days  Follow up Comment: 11/18/24

## 2024-11-12 NOTE — PROGRESS NOTES
Physical Therapy Treatment and Weekly Progress Note       11/12/24 5636-6094   PT  Visit   PT Received On 11/12/24   Response to Previous Treatment   (uses thumbs up to communicate ready for therapy)   General   Reason for Referral decreased mobility, SDH   Referred By Dr. Patterson   Past Medical History Relevant to Rehab HTN, anxiety, dysphagia, Parkinson's, progressive supranuclear palsy, dystona   Patient Position Received Up in chair   General Comment agreeable to tx   Precautions   Hearing/Visual Limitations slowed smooth pursuits, saccades with limited eye movement especially past midline due to supranuclear palsy and resulting limited movement of eyes. Convergence/divergence absent. Must turn head to look past midline. Lower half of visual field appears to be limited. Has prism glasses but she does not like to wear them   Medical Precautions Fall precautions;Swallowing precautions   Precautions Comment puree diet and thin liquids   Pain Assessment   Pain Assessment 0-10   0-10 (Numeric) Pain Score 0 - No pain   Cognition   Arousal/Alertness Delayed responses to stimuli   Following Commands Follows one step commands with increased time   Processing Speed Delayed   Coordination   Upper Body Coordination rigidity noted   Lower Body Coordination rigidity noted   Trunk Coordination rigidity noted   Postural Control   Posture Comment rigidity noted x 4 extemeties, neck and trunk   Static Sitting Balance   Static Sitting-Balance Support Feet supported;Bilateral upper extremity supported   Static Sitting-Level of Assistance Contact guard   Dynamic Sitting Balance   Dynamic Sitting-Balance Support Feet supported;Bilateral upper extremity supported   Dynamic Sitting-Level of Assistance Minimum assistance   Static Standing Balance   Static Standing-Balance Support Bilateral upper extremity supported   Static Standing-Level of Assistance Minimum assistance;Moderate assistance   Static Standing-Comment/Number of Minutes  with u step walker   Dynamic Standing Balance   Dynamic Standing-Balance Support Bilateral upper extremity supported   Dynamic Standing-Level of Assistance Moderate assistance   Dynamic Standing-Comments with ustep walker   Cervical Spine    Cervical Spine Comment tight neck musculature with head held in right rotation, left sidebending and slight extension. Gentle massage and gentle stretching with some relief reported by patient via thumbs up sign. Able to achieve midline position.   Lumbar Spine    Lumbar Spine Comment trunk rigidity noted with decresed rotation   AROM RLE (degrees)   RLE AROM Comment rigidity noted with movements but ROM WFL except hamstring contracture at approx 10-15 degrees knee flex   Strength RLE   R Hip Flexion 4+/5   R Hip Extension 4/5   R Hip ABduction 4+/5   R Hip ADduction 4+/5   R Knee Flexion 4/5   R Knee Extension 4+/5   R Ankle Dorsiflexion 4+/5   R Ankle Plantar Flexion 4+/5   AROM LLE (degrees)   LLE AROM Comment rigidity noted with movements but ROM WFL except hamstring contracture at approx 10-15 degrees knee flex   Strength LLE   L Hip Flexion 4+/5   L Hip Extension 4/5   L Hip ABduction 4+/5   L Hip ADduction 4+/5   L Knee Flexion 4/5   L Knee Extension 4+/5   L Ankle Dorsiflexion 4+/5   L Ankle Plantar Flexion 4+/5   Therapeutic Exercise   Therapeutic Exercise Activity 1 hooklying with hips and knees at 90 degrees over green swiss ball. Trunk rotation 1 x 15 reps each side   Therapeutic Exercise Activity 2 bridging with LEs at 90 degrees over green swiss ball 2 x 15   Therapeutic Exercise Activity 3 seated edge of mat, opposite elbow to knee, 1 x 15   Therapeutic Exercise Activity 4 patient seated edge of mat with large swiss ball in front of her, rolling ball forward/backward and to left and right x 15 reps each direction CGA due to rigidity of trunk   Balance/Neuromuscular Re-Education   Balance/Neuromuscular Re-Education Activity 1 FIST 22/56   Balance/Neuromuscular  "Re-Education Activity 2 TUG 3 min 20 sec with u step walker and  mod assist straight, mod/max assist and freezing on turns.   Bed Mobility 1   Bed Mobility 1 Rolling right;Rolling left;Sitting to supine   Level of Assistance 1 Close supervision   Bed Mobility 2   Bed Mobility  2 Supine to sitting   Level of Assistance 2 Contact guard   Bed Mobility Comments 2 with rail and increased time   Ambulation/Gait Training 1   Surface 1 Level tile   Device 1   (ustep walker)   Gait Support Devices Gait belt;Wheelchair follow   Assistance 1 Moderate assistance   Quality of Gait 1 Diminished heel strike;Inconsistent stride length;Ataxic   Comments/Distance (ft) 1 50', 30' slow pace. Cues for decreased step length and to increase MARTINA with good correction noted. Occasional amb on toes with cues to correct. With gait freezing, cues to \"stop and reset\" with good carryover. PT controls speed of u step.   Transfer 1   Technique 1 Sit to stand;Stand to sit   Transfer Device 1 Gait belt   Transfer Level of Assistance 1 Minimum assistance   Trials/Comments 1 verbal and tactile cues as well as min assist for trunk flex with sitting. Cues also for anterior weight shift due to retro lean upon standing. Increased time to complete.   Transfers 2   Transfer From 2 Wheelchair to;Mat to   Transfer to 2 Mat;Wheelchair   Technique 2 Stand pivot;To right;To left   Transfer Device 2 Gait belt   Transfer Level of Assistance 2 Minimum assistance   Trials/Comments 2 step by step cues for safe technique and body position   Stairs   Stairs No    Object From Floor   Comments unsafe   Wheelchair Activities   Propulsion Type 1 Manual   Level 1 Level tile   Method 1 Right upper extremity;Left upper extremity   Level of Assistance 1 Close supervision   Description/Details 1 200' inlcudes turns, occasional cues for obstacle avoidance   Activity Tolerance   Endurance Tolerates 30 min exercise with multiple rests   PT Assessment   PT Assessment Results " Decreased strength;Decreased range of motion;Decreased endurance;Impaired balance;Decreased mobility;Decreased coordination;Impaired vision;Impaired tone   Rehab Prognosis   (for goals listed)   Evaluation/Treatment Tolerance Patient tolerated treatment well   Barriers to Participation Comorbidities   Assessment Comment Patient progressing slowly towards goals. Persistant trunk rigidity noted and delayed motor planning. Anticipate need for 24 hour assist at home. High fall risk. Recommend tilt in space wheelchair with head support, pressure releiving cushion and ramp at home entry. Would benefit from family training prior to home going.   End of Session Patient Position   (in tilt in space chair with ST)   PT Plan   Inpatient/Swing Bed or Outpatient Inpatient   PT Plan   Treatment/Interventions Bed mobility;Transfer training;Gait training;Balance training;Neuromuscular re-education;Strengthening;Therapeutic exercise;Therapeutic activity;Wheelchair management;Postural re-education   PT Plan Ongoing PT   PT Recommended Transfer Status Assist x1   PT - OK to Discharge Yes   Problem: IRF PT LTG Problem  Goal: Patient will roll right and left with independent assist to facilitate mobility.  Outcome: Progressing  Goal: Patient will transfer supine to sit and sit to supine with supervision assist to facilitate mobility.  Outcome: Progressing  Goal: Patient will transfer sit to stand and stand to sit with CGA assist to facilitate mobility.  Outcome: Progressing  Goal: Patient will transfer bed to chair and chair to bed with contact guard assist to facilitate mobility.  Outcome: Progressing  Goal: Patient will amb 100 feet least restrictive device including two turns on even surface with minimal assist to facilitate safe mobility.    Outcome: Progressing  Goal: Patient will propel wheelchair 150 feet with two turns on even surface with independent assist to facilitate safe mobility.   Outcome: Progressing  Goal: Patient will  perform TUG with least restrictive assistive device in 2 minutes or less to promote mobility and reduce fall risk with dynamic standing tasks.   Outcome: Progressing  Goal: Patient will improve FIST score to  40/56  to promote mobility and safety with seated activities.   Outcome: Progressing  Goal: Patient will increase BLE strength to 4+/5 to improve functional mobility.   Outcome: Progressing  Goal: Patient will reduce knee flexion contracture by 10 degrees  Outcome: Progressing

## 2024-11-12 NOTE — PROGRESS NOTES
Speech-Language Pathology    SLP Adult IRF [facility] Dysphagia and Speech-Language Pathology Treatments     Patient Name: Leidy Teixeiar  MRN: [id number]  Today's Date: 11/12/2024  Time Calculation  Start Time: 1100  Stop Time: 1140  Time Calculation (min): 40 min     1/3sw +sp    Current Problem:   SDH with PMH significant for HTN, anxiety, dysphagia, Parkinson's, progressive supranuclear palsy, dystonia     SLP Assessment:  SLP TX Intervention Outcome: Making Progress Towards Goals  SLP Assessment Results: Expression deficits, Other (Comment) (dysphagia)  Prognosis: Good  Treatment Tolerance: Patient tolerated treatment well  Medical Staff Made Aware: Yes  Strengths: Family/Caregiver Support, Motivation  Barriers: Comorbidities  Education Provided: Yes     Plan:  Inpatient/Swing Bed or Outpatient: Inpatient  Treatment/Interventions: Communication functioning, Other (Comment) (dysphagia)  SLP TX Plan: Continue Plan of Care, Other (Comment) (change frequency to 3x/wk)  SLP Plan: Skilled SLP  SLP Frequency: 3x per week  Duration: 2 weeks  SLP Discharge Recommendations: Other (Comment) (TBD pending progress)  Next Treatment Priority: diet tolerance, strat's, comm board  Discussed POC: Patient, Nursing  Discussed Risks/Benefits: Yes, Patient, Nursing  Patient/Caregiver Agreeable: Yes      Subjective   Pt seen upright in tilt in space wheelchair. She is wearing sunglasses as bright lights bother her eyes. She is more comfortable working on her room in a low light setting. At this time, uncertain of further discharge plans as the patient does not wish alternate means of nutrition/hydration.     General Visit Information:   Reason for Referral: Follow-up dysphagia and speech-language treatment  Referred By: Dr. Patterson  Past Medical History Relevant to Rehab: HTN, anxiety, dysphagia, Parkinson's, progressive supranuclear palsy, dystonia  Prior to Session Communication: Bedside nurse    Pain Assessment:  Pain Assessment  0-10  "(Numeric) Pain Score: 0 - No pain      Objective   DYSPHAGIA GOALS (established 11/5 , anticipated end 2 weeks):  1) Pt to tolerate recommended diet to 90% without overt s/s of aspiration in order to ensure pt is demonstrating adequate swallow function for meeting nutrition and hydration needs.   PROGRESS: Staff reports increased p.o. intake over night shift with pt requesting items such as ice-cream/orange juice/pudding, etc. Staff also reports pt stating \"hungry\" during night shift. At this time, offered ice-cream and orange juice. However, pt gives thumbs down for \"no.\" Asked if she is feeling fatigued after PT session, prompt thumbs up for \"yes.\"   STATUS: continue pureed, thin with 1:1 assist with feed and controlled sip drinks. PO intake only when pt is alert and not overly fatigued. Discussed with RN, staff.  2) Pt to demonstrate functional knowledge and use of compensatory swallowing strategies to 90% to reduce risk of aspiration and s/s dysphagia.  PROGRESS: SLP attempted to feed small tsp of ice-cream. Mild ant bolus loss of which pt appeared aware. Delayed swallow onset. Pt gave thumbs up when asked if she was fatigued. Offered oral care and pt gave thumbs down. Pt appears to benefit from tilt in space wheelchair for head/neck support.  STATUS: continue controlled sip drinks and cued swallow only when pt is fully alert and not fatigued.     LONG TERM GOAL: Patient will tolerate the least restrictive diet without overt difficulty or pulmonary compromise by time of discharge    Therapeutic Swallow:  Therapeutic Swallow Intervention : Compensatory Strategies, PO Trials  Solid Diet Recommendations: Pureed/extremely thick  (IDDSI Level 4)  Liquid Diet Recommendations: Thin (IDDSI Level 0)  Swallow Comments: medications crushed in applesauce    SPEECH-LANGUAGE GOALS (established 11/5, anticipate end 2 weeks):  Pt will accurately locate 4 icons on SGD.  PROGRESS: Pt appears more comfortable with low tech " "communication board in room. She was able to locate lights on/off, \"tired\" icon, and \"no pain\" on pain scale.               STATUS: continue, progressing with low tech communication board when held above nose l evel.  2. Pt will utilize communication board/icons to express at least 1 want/need.  PROGRESS: Pt independently selected \"lights off\" icon then pointed to window shade. SLP pulled window shade down and pt gave a thumbs up for \"yes.\"              STATUS: continue, consider possibly adding food choices if appropriate.  3. Ongoing assessment if indicated for further goal development or to determine progress, as needed.  LTG: Pt will improve expressive communication for basic needs/wants.    Inpatient Education:  Adult Inpatient Education  Individual(s) Educated: Patient  Verbal Education : progress, goals  Risk and Benefits Discussed with Patient/Caregiver/Other: yes  Patient/Caregiver Demonstrated Understanding: yes  Plan of Care Discussed and Agreed Upon: yes  Patient Response to Education: Patient/Caregiver Verbalized Understanding of Information  Education Comment: Discussed with RN                       "

## 2024-11-12 NOTE — NURSING NOTE
Assumed care of patient at report. Patient laying in bed and has a appetite, encouraging fluids, patient uses her adaptive cup effectively. No signs of pain noted. Will monitor for changes through shift

## 2024-11-12 NOTE — PROGRESS NOTES
11/12/24 0900 to 1000   General   Reason for Referral decreased mobility, SDH   Referred By Dr. Patterson   Past Medical History Relevant to Rehab HTN, anxiety, dysphagia, Parkinson's, progressive supranuclear palsy, dystona   Prior to Session Communication Bedside nurse   Patient Position Received Bed, 3 rail up;Alarm on   Preferred Learning Style verbal;auditory   General Comment agreeable to tx   Precautions   Precautions Comment puree diet and thin liquids   Pain Assessment   Pain Assessment 0-10   0-10 (Numeric) Pain Score 0 - No pain   Feeding   Feeding Level of Assistance Maximum assistance   Feeding Where Assessed Bed level   Feeding Comments asist with solid foods supervised for fluids with dysphia cup   Grooming   Grooming Level of Assistance Setup   Grooming Where Assessed Wheelchair   Grooming Comments able to wash face did not complete oral care   UE Bathing   UE Bathing Level of Assistance Minimum assistance   UE Bathing Where Assessed Bed level   UE Bathing Comments with cues and set up patient washed upper body with min a throughness   LE Bathing   LE Bathing Level of Assistance Minimum assistance   LE Bathing Where Assessed Bed level   LE Bathing Comments extra time with use of lhs sponge for upper and lower legs able to complete anterior per assist with buttocks   UE Dressing   UE Dressing Level of Assistance Minimum assistance   UE Dressing Where Assessed Wheelchair   UE Dressing Comments extra time and assist with pull down   LE Dressing   LE Dressing Yes   Pants Level of Assistance Setup  (supine in bed able to bridge for pants pull up)   Sock Level of Assistance Setup  (supine in bed utilizing cross leg technique)   Shoe Level of Assistance Setup;Contact guard   Adult Briefs Level of Assistance Dependent   LE Dressing Where Assessed Bed level   Toileting   Toileting Level of Assistance Maximum assistance   Where Assessed Bedside commode   Toileting Comments assist with all parts of clothing  able to complete anterior bob hygiene with set up and extra time   Bed Mobility   Bed Mobility Yes   Bed Mobility 1   Bed Mobility 1 Supine to sitting   Level of Assistance 1 Minimum assistance   Bed Mobility Comments 1 assist with trunk ridgid at trunk .   Transfers   Transfer Yes   Transfer 1   Transfer From 1 Bed to   Transfer to 1 Wheelchair   Technique 1 Stand pivot   Transfer Device 1 Gait belt   Transfer Level of Assistance 1 Minimum assistance   Trials/Comments 1 retropulsive with cues for handplacement ridgid trunk with extra time for transfer   Toilet Transfers   Toilet Transfer From Wheelchair   Toilet Transfer Type To and from   Toilet Transfer to Standard bedside commode  (a top toilet)   Toilet Transfer Technique To right;To left;Stand pivot   Toilet Transfers Moderate assistance;Minimal assistance   Toilet Transfers Comments heavy use of wall mounted grab bar mod a cues for turning and handplacement, retro pulsive and ridgid   IP OT Assessment   OT Assessment Patient progressing towards established goals limited by fatigue. Will continue to address remaining deficits with skilled OT intervention   Prognosis Fair   Barriers to Discharge None   Evaluation/Treatment Tolerance Patient limited by fatigue   Medical Staff Made Aware Yes   End of Session Communication Bedside nurse informed  right eye red with discharge, cloudy urine and irritation on posterior head    End of Session Patient Position Up in chair;Alarm on   OT Assessment   OT Assessment Results Decreased ADL status;Decreased upper extremity range of motion;Decreased upper extremity strength;Decreased safe judgment during ADL;Decreased endurance;Visual deficit;Decreased fine motor control;Decreased functional mobility;Decreased gross motor control   Strengths Ability to acquire knowledge   Barriers to Participation Comorbidities   Education   Individual(s) Educated Patient   Education Provided   (instructed on safe adls and transfers)    Education Comment will require reinforcement   Inpatient/Swing Bed or Outpatient   Inpatient/Swing Bed or Outpatient Inpatient   Inpatient Plan   OT - OK to Discharge Yes

## 2024-11-12 NOTE — PROGRESS NOTES
"[facility]  Physician Progress Note    Subjective   Leidy Teixeira is a 54 y.o. female admitted to inpatient rehabilitation unit.    Better day - asked for snacks overnight.  Breakfast- ate breakfast oatmeal, eggs - ate 75%.  Does like the ensure.  BM CGA  SS xfer min A  Amb w/ Ustep 50 feet mod A  ADLs mod A  SLP feels she is close to baseline - but was previously more verbal    Objective   /71 (BP Location: Right arm, Patient Position: Lying)   Pulse 62   Temp 36.5 °C (97.7 °F) (Temporal)   Resp 16   Ht 1.676 m (5' 5.98\")   Wt (!) 44.3 kg (97 lb 11.5 oz)   LMP  (LMP Unknown)   SpO2 98%   BMI 16.26 kg/m²      Physical Exam  Constitutional:       General: She is not in acute distress.     Appearance: Normal appearance. She is not toxic-appearing.   HENT:      Head: Normocephalic and atraumatic.      Mouth/Throat:      Mouth: Mucous membranes are moist.   Eyes:      Pupils: Pupils are equal, round, and reactive to light.   Cardiovascular:      Rate and Rhythm: Normal rate and regular rhythm.      Heart sounds: No murmur heard.  Pulmonary:      Breath sounds: Normal breath sounds. No wheezing, rhonchi or rales.   Abdominal:      General: There is no distension.      Palpations: Abdomen is soft.   Musculoskeletal:      Right lower leg: No edema.      Left lower leg: No edema.   Neurological:      Mental Status: She is alert.      She is rigid in all extremities.  She does not make eye contact but will track.  She says few words.    Labs  BMP:  Results from last 7 days   Lab Units 11/06/24  1809   CREATININE mg/dL 0.46*   BUN mg/dL 32*   SODIUM mmol/L 138   POTASSIUM mmol/L 4.1   CHLORIDE mmol/L 105   CO2 mmol/L 25     CBC:  Results from last 7 days   Lab Units 11/06/24  1809   WBC AUTO x10*3/uL 8.3   HEMOGLOBIN g/dL 13.3   HEMATOCRIT % 40.7   MCV fL 96   PLATELETS AUTO x10*3/uL 269     Coagulation:       Assesment and Plan    Sdh (Subdural Hematoma) " "(Multi)   Parkinson's Disease With Dyskinesia and Fluctuating Manifestations   Progressive Supranuclear Palsy (Multi)      Subdural hematoma treated conservatively.  Follow-up with the surgeon as requested.      Hypertension, to continue the lisinopril.     Anxiety, depression.  Has been on the SSRI, continue.     Progressive supranuclear palsy, Parkinson's disease.  Continue home meds.    Dysphagia - SLP to continue to see her; cont 1:1 feeding    Severe malnutrition. Meeting w/  today re: PEG and overall progress - patient made clear to me today w/ \"thumbs down\" that she does not want PEG now.    Chaim Patterson MD        "

## 2024-11-12 NOTE — CARE PLAN
The patient's goals for the shift include      The clinical goals for the shift include maintain saafety    Over the shift, the patient did not make progress toward the following goals. Barriers to progression include . Recommendations to address these barriers include .

## 2024-11-12 NOTE — PROGRESS NOTES
Physical Therapy       11/12/24 [id number]   PT  Visit   PT Received On 11/12/24   General   Reason for Referral decreased mobility, SDH   Referred By Dr. Patterson   Past Medical History Relevant to Rehab HTN, anxiety, dysphagia, Parkinson's, progressive supranuclear palsy, dystonia   Prior to Session Communication Bedside nurse   Patient Position Received Up in chair   General Comment agreeable to tx   Precautions   Hearing/Visual Limitations slowed smooth pursuits, saccades with limited eye movement especially past midline due to supranuclear palsy and resulting limited movement of eyes. Convergence/divergence absent. Must turn head to look past midline. Lower half of visual field appears to be limited. Has prism glasses but she does not like to wear them   Medical Precautions Fall precautions;Swallowing precautions   Precautions Comment puree diet and thin liquids   Pain Assessment   0-10 (Numeric) Pain Score 0 - No pain   Cognition   Arousal/Alertness Delayed responses to stimuli   Following Commands Follows one step commands with increased time   Processing Speed Delayed   Coordination   Upper Body Coordination rigidity noted   Lower Body Coordination rigidity noted   Trunk Coordination rigidity noted   Postural Control   Posture Comment rigidity noted x 4 extemeties, neck and trunk   AROM LLE (degrees)   LLE AROM Comment rigidity noted with movements but ROM WFL except hamstring contracture at approx 10-15 degrees knee flex   Therapeutic Exercise   Therapeutic Exercise Activity 1 LAQ 2 x 15 with gentle stretch at end range 5 reps   Therapeutic Exercise Activity 2 seated hip flex 2 x 15   Therapeutic Activity   Therapeutic Activity 1 Omnicycle level 2 x 10 min   Therapeutic Activity 2 Massage to posterior neck muscles and upper traps to decrease muscle tightness   Ambulation/Gait Training 1   Surface 1 Level tile   Gait Support Devices Gait belt;Wheelchair follow   Assistance 1 Moderate assistance   Quality of  "Gait 1 Diminished heel strike;Inconsistent stride length;Ataxic   Comments/Distance (ft) 1 50', 30' slow pace. Cues for decreased step length and to increase MARTINA with good correction noted. Occasional amb on toes with cues to correct. With gait freezing, cues to \"stop and reset\" with good carryover. PT controls speed of u step.   Transfer 1   Technique 1 Sit to stand;Stand to sit   Transfer Device 1 Gait belt   Transfer Level of Assistance 1 Minimum assistance   Trials/Comments 1 verbal and tactile cues as well as min assist for trunk flex with sitting. Cues also for anterior weight shift due to retro lean upon standing. Increased time to complete.   Activity Tolerance   Endurance Tolerates 30 min exercise with multiple rests   PT Assessment   PT Assessment Results Decreased strength;Decreased range of motion;Decreased endurance;Impaired balance;Decreased mobility;Decreased coordination;Impaired vision;Impaired tone   Evaluation/Treatment Tolerance Patient tolerated treatment well   Barriers to Participation Comorbidities   End of Session Communication Bedside nurse   Assessment Comment Patient progressing slowly towards goals. Persistant trunk rigidity noted and delayed motor planning. Anticipate need for 24 hour assist at home. High fall risk. Recommend tilt in space wheelchair with head support, pressure releiving cushion and ramp at home entry. Would benefit from family training prior to home going.   End of Session Patient Position   (up in tilt and space chair in OT room for treatment)   PT Plan   Inpatient/Swing Bed or Outpatient Inpatient   PT Plan   Treatment/Interventions Transfer training;Gait training;Endurance training;Therapeutic exercise;Therapeutic activity   PT Plan Ongoing PT   PT Recommended Transfer Status Assist x1   PT - OK to Discharge Yes     "

## 2024-11-12 NOTE — NURSING NOTE
Between 0430 and 0600 pt asked for and finished 2 containers of both jello and ice cream. Her brief was completely dry during the night and pt was encouraged to drink and requested orange juice. Pt smiled during our interactions and even laughed at one point. Pt's lips are red and chapped; lip moisturizer was applied. Pt was dressed for her 8am therapy and resting comfortably. Call light is w/in reach; bed alarm is on.

## 2024-11-12 NOTE — PROGRESS NOTES
11/12/24 1330 to 1400   General   Reason for Referral decreased mobility, SDH   Referred By Dr. Patterson   Past Medical History Relevant to Rehab HTN, anxiety, dysphagia, Parkinson's, progressive supranuclear palsy, dystonia   Prior to Session Communication Bedside nurse   Patient Position Received Up in chair   Preferred Learning Style verbal;auditory   General Comment agreeable to tx, communicates mostly with thumbs up and down   Precautions   Medical Precautions Fall precautions;Swallowing precautions   Precautions Comment puree diet and thin liquids   Pain Assessment   Pain Assessment 0-10   0-10 (Numeric) Pain Score 0 - No pain   IP OT Assessment   End of Session Communication PCT/NA/CTA  (taken back to room by)   End of Session Patient Position Up in chair;Alarm on   OT Assessment   OT Assessment Results Decreased ADL status;Decreased upper extremity range of motion;Decreased upper extremity strength;Decreased safe judgment during ADL;Decreased endurance;Visual deficit;Decreased fine motor control;Decreased functional mobility;Decreased gross motor control   Strengths Ability to acquire knowledge   Barriers to Participation Comorbidities   Inpatient/Swing Bed or Outpatient   Inpatient/Swing Bed or Outpatient Inpatient   Inpatient Plan   Treatment Interventions Fine motor coordination activities   OT - OK to Discharge Yes      11/12/24 1330   OT Adult Other Outcome Measures   9 Hole Peg Test RUE 3mins 58 secs improved from 7 pegs in 3 mins 30 secs. LUE 4 mins 01secs improved from unable to complete   Box and Block RUE  15 blks/min LUE 10  Blks/min   Other Outcome Measures  strength RUE 30lbs LUE 30lbs improved 5 lbs

## 2024-11-12 NOTE — PROGRESS NOTES
WEEKLY PROGRESS NOTE  Evaluation date: 11/05/24    Treatment Rx provided: See daily flowsheet        PRECAUTIONS  Precautions  Hearing/Visual Limitations: slowed smooth pursuits, saccades with limited eye movement especially past midline due to supranuclear palsy and resulting limited movement of eyes. Convergence/divergence absent. Must turn head to look past midline. Lower half of visual field appears to be limited. Has prism glasses but she does not like to wear them  Medical Precautions: Fall precautions, Swallowing precautions  Precautions Comment: puree diet and thin liquids    ADL/ Functional Status:  Eating Maximum assistance   Grooming Setup   Toilet Hygiene Maximum assistance   Shower/Bathe Upper:  Minimum assistance  Lower:  Minimum assistance   Upper Body Dress Minimum assistance   Lower Body Dress Setup (supine in bed able to bridge for pants pull up)   On/Off Footwear Socks:  Setup (supine in bed utilizing cross leg technique)  Shoes:  Setup, Contact guard   Toilet Transfer Moderate assistance, Minimal assistance   Car Transfer Maximal assistance   Meal Preparation  N/A   Kitchen Mobility  N/A     Standardized Tests:  Box of blocks Box and Block: RUE  15 blks/min LUE 10  Blks/min   9 hole peg test 9 Hole Peg Test: RUE 3mins 58 secs improved from 7 pegs in 3 mins 30 secs. LUE 4 mins 01secs improved from unable to complete    strength  Bits Kim cancellation  [place] making A/B Other Outcome Measures:  strength RUE 30lbs LUE 30lbs improved 5 lbs      ROM - RIGHT Upper Extremity  Shoulder:   Flex R Shoulder Flexion  0-170: 100 Degrees (delayed, rigid movement)  Ext    Abd   Elbow:     Flex/ext       Forearm:   Supination    Pronation   Wrist:   Flex    Ext     Wrist Deviation:   Radial    Ulnar       ROM - LEFT Upper Extremity  Shoulder:   Flex L Shoulder Flexion  0-170: 100 Degrees (delayed, rigid movement)  Ext    Abd   Elbow:     Flex/ext       Forearm:   Supination    Pronation   Wrist:   Flex     Ext:     Wrist Deviation:   Radial    Ulnar       [facility] Daily Activity  Putting on and taking off regular lower body clothing: A lot  Bathing (including washing, rinsing, drying): A little  Putting on and taking off regular upper body clothing: A little  Toileting, which includes using toilet, bedpan or urinal: A lot  Taking care of personal grooming such as brushing teeth: A lot  Eating Meals: A lot  Daily Activity - Total Score: 14    Plan Of Care: Pt has attained all STM goals and is progressing towards LTM goals. During bed mobility and functional transfers pt is retropulsive requiring verbal cues for hand placement.  Pt presents with a rigid trunk and requires extra time for transfer.  Pt presents with increased appetite and intake this date; however requires assistance to self feed.  OT recommends the following equipment for home: hospital bed, bedside commode for over toilet, gait belt, grab bars in the shower and a shower seat with a back and arms.  Again with the lateral lean, trunk rigidity and extension pt is high risk for sliding forward on the bedside commode and shower chair. Recommend 24 hour care/supervision. Physician and LSW scheduled to have a meeting with pts spouse this date regarding pts next level of care. Continue OT per POC and begin DC planning.     Problem: ADLs LTM Goal  Goal: Patient will perform UB and LB bathing seated with contact guard assist level of assistance and grab bars and shower chair.  Outcome: Progressing  Goal: Patient with complete lower body dressing with contact guard assist  level of assistance donning and doffing all LE clothes  with PRN adaptive equipment while supported sitting.  Outcome: Progressing  Goal: Patient will feed self with supervision level of assistance and verbal cues using PRN adaptive equipment.  Outcome: Progressing  Goal: Patient will complete daily grooming tasks brushing teeth and washing face/hair with supervision level of assistance and PRN  adaptive equipment while supported sitting.  Outcome: Progressing  Goal: Patient will complete toileting including clothing management/hygiene with contact guard assist  level of assistance and grab bars and bedside commode.  Outcome: Progressing     Problem: TRANSFERS LTM Goals  Goal: Patient will complete functional transfer to toilet with front wheeled walker with contact guard assist  level of assistance.  Outcome: Progressing  Goal: Patient will complete functional car transfer with front wheeled walker with contact guard assist level of assistance.  Outcome: Progressing

## 2024-11-13 PROCEDURE — 97110 THERAPEUTIC EXERCISES: CPT | Mod: GP

## 2024-11-13 PROCEDURE — 97110 THERAPEUTIC EXERCISES: CPT | Mod: GO,CO

## 2024-11-13 PROCEDURE — 97535 SELF CARE MNGMENT TRAINING: CPT | Mod: GO,CO

## 2024-11-13 PROCEDURE — 97112 NEUROMUSCULAR REEDUCATION: CPT | Mod: GP

## 2024-11-13 PROCEDURE — 97116 GAIT TRAINING THERAPY: CPT | Mod: GP

## 2024-11-13 PROCEDURE — 2500000005 HC RX 250 GENERAL PHARMACY W/O HCPCS: Performed by: INTERNAL MEDICINE

## 2024-11-13 PROCEDURE — 97530 THERAPEUTIC ACTIVITIES: CPT | Mod: GP

## 2024-11-13 PROCEDURE — 1180000001 HC REHAB PRIVATE ROOM DAILY

## 2024-11-13 PROCEDURE — 97530 THERAPEUTIC ACTIVITIES: CPT | Mod: GO,CO

## 2024-11-13 PROCEDURE — 92507 TX SP LANG VOICE COMM INDIV: CPT | Mod: GN

## 2024-11-13 PROCEDURE — 2500000001 HC RX 250 WO HCPCS SELF ADMINISTERED DRUGS (ALT 637 FOR MEDICARE OP): Performed by: INTERNAL MEDICINE

## 2024-11-13 PROCEDURE — 92526 ORAL FUNCTION THERAPY: CPT | Mod: GN

## 2024-11-13 ASSESSMENT — PAIN - FUNCTIONAL ASSESSMENT
PAIN_FUNCTIONAL_ASSESSMENT: 0-10

## 2024-11-13 ASSESSMENT — PAIN SCALES - GENERAL
PAINLEVEL_OUTOF10: 0 - NO PAIN

## 2024-11-13 ASSESSMENT — ACTIVITIES OF DAILY LIVING (ADL)
BATHING_LEVEL_OF_ASSISTANCE: MINIMUM ASSISTANCE
BATHING_EQUIPMENT_NEEDED: REMOVEABLE SHOWER HEAD
HOME_MANAGEMENT_TIME_ENTRY: 60
BATHING_WHERE_ASSESSED: SHOWER

## 2024-11-13 NOTE — PROGRESS NOTES
Physical Therapy       11/13/24 [id number]   PT  Visit   PT Received On 11/13/24   Response to Previous Treatment Patient with no complaints from previous session.   General   Reason for Referral decreased mobility, SDH   Referred By Dr. Patterson   Past Medical History Relevant to Rehab HTN, anxiety, dysphagia, Parkinson's, progressive supranuclear palsy, dystonia   Patient Position Received Up in chair   Preferred Learning Style verbal;auditory   General Comment agreeable to tx, communicates mostly with thumbs up and down and some words   Precautions   Hearing/Visual Limitations slowed smooth pursuits, saccades with limited eye movement especially past midline due to supranuclear palsy and resulting limited movement of eyes. Convergence/divergence absent. Must turn head to look past midline. Lower half of visual field appears to be limited. Has prism glasses but she does not like to wear them   Medical Precautions Fall precautions;Swallowing precautions   Precautions Comment puree diet and thin liquids   Pain Assessment   Pain Assessment 0-10   0-10 (Numeric) Pain Score 0 - No pain   Cognition   Arousal/Alertness Delayed responses to stimuli   Orientation Level Oriented X4  (uses minimla verbalization and hand gestures to communicate)   Following Commands Follows one step commands with increased time   Cognition Comments patient consistent responds appropriately to yes/no questions using thumbs up/thumbs/down. Limited ability to talk due to rigid facial musculature   Coordination   Upper Body Coordination rigidity noted   Lower Body Coordination rigidity noted   Trunk Coordination rigidity noted   Finger to Nose Bradykinesia   Rapid Alternating Movements Bradykinesia   Alternating Toe Taps Bradykinesia   Postural Control   Postural Control Impaired   Head Control tight neck musculature with head held in right rotation, left sidebending and slight extension   Trunk Control rigid trunk   Posture Comment rigidity noted x  4 extemeties, neck and trunk   Static Sitting Balance   Static Sitting-Balance Support Feet supported;Bilateral upper extremity supported   Static Sitting-Level of Assistance Close supervision   Dynamic Sitting Balance   Dynamic Sitting-Balance Support Feet supported;Bilateral upper extremity supported   Dynamic Sitting-Level of Assistance Close supervision   Static Standing Balance   Static Standing-Balance Support Bilateral upper extremity supported   Static Standing-Level of Assistance Minimum assistance   Dynamic Standing Balance   Dynamic Standing-Balance Support Bilateral upper extremity supported   Dynamic Standing-Level of Assistance Moderate assistance   Dynamic Standing-Comments with u step walker   Cervical Spine    Cervical Spine Comment tight neck musculature with head held in right rotation, left sidebending and slight extension. Gentle massage and gentle stretching with some relief reported by patient via thumbs up sign. Able to achieve midline position.   Lumbar Spine    Lumbar Spine Comment trunk rigidity noted with decresed rotation   Therapeutic Exercise   Therapeutic Exercise Activity 1 SAQ 2 x 15, 2.5#   Therapeutic Exercise Activity 2 bridging 2 x 15 with hips and knees at 90 degrees over grenn swiss ball   Therapeutic Exercise Activity 3 supine hip abd/add 2 x 15, 2.5#   Therapeutic Exercise Activity 4 supine heel slides 2 x 15, 2.5#   Therapeutic Exercise Activity 5 hooklying trunk rotation with LEs on green swiss ball rotates 15 repes each direction   Therapeutic Exercise Activity 6 seated edge of mat, opposite elbow to knee, 1 x 15   Bed Mobility 1   Bed Mobility 1 Sitting to supine   Level of Assistance 1 Close supervision   Bed Mobility 2   Bed Mobility  2 Supine to sitting   Level of Assistance 2 Contact guard;Minimum assistance   Bed Mobility Comments 2 HOB flat and rail. Min assist to initiate movement and then CGA to complete the transfers. Cues for safe technique and increased time  "to complete.   Ambulation/Gait Training 1   Surface 1 Level tile   Gait Support Devices Gait belt;Wheelchair follow   Assistance 1 Moderate assistance   Quality of Gait 1 Diminished heel strike;Inconsistent stride length;Ataxic;NBOS   Comments/Distance (ft) 1 Prism glasses on. 50' x 2. First 10' do not require wheelchair follow. Good MARTINA maintained with prism glasses on. No toe walking noted this session. With gait frezzing, cues to \" stop and reset\" with good carryover. PT controls speed of u step walker.   Transfer 1   Technique 1 Stand to sit;Sit to stand   Transfer Device 1 Gait belt   Transfer Level of Assistance 1 Contact guard;Minimum assistance   Trials/Comments 1 slight posterior lean intially when standing but can correct with cues   Transfers 2   Transfer From 2 Wheelchair to;Mat to   Transfer to 2 Mat;Wheelchair   Technique 2 Stand pivot;To right;To left   Transfer Device 2 Gait belt   Transfer Level of Assistance 2 Minimum assistance   Trials/Comments 2 step by step cues and increased time to complete   Wheelchair Activities   Propulsion Type 1 Manual   Level 1 Level tile   Method 1 Right upper extremity;Left upper extremity   Level of Assistance 1 Close supervision   Description/Details 1 200' inlcudes turns, occasional cues for obstacle avoidance   Activity Tolerance   Endurance Tolerates 30 min exercise with multiple rests   PT Assessment   PT Assessment Results Decreased strength;Decreased range of motion;Decreased endurance;Impaired balance;Decreased mobility;Decreased coordination;Impaired vision;Impaired tone   Evaluation/Treatment Tolerance Patient tolerated treatment well   Barriers to Participation Comorbidities   Assessment Comment Persistant trunk rigidity noted and delayed motor planning. Anticipate need for 24 hour assist at home. Short distance amb completed without wheelchair follow.   End of Session Patient Position Up in chair;Alarm on   PT Plan   Inpatient/Swing Bed or Outpatient " Inpatient   PT Plan   Treatment/Interventions Bed mobility;Transfer training;Gait training;Balance training;Neuromuscular re-education;Strengthening;Therapeutic exercise;Therapeutic activity;Wheelchair management   PT Plan Ongoing PT   PT Recommended Transfer Status Assist x1   PT - OK to Discharge Yes

## 2024-11-13 NOTE — NURSING NOTE
Patient consumed 2 ensure nutrition drinks this shift, heavy urinary incontinence x 1, communication noted as improving, no adverse reaction noted to erythromycin ointment to left eye, patient tolerated well, hospice consult ordered, no s/s of pain or discomfort, baseline mentation, call light within reach safety measures remain in place.

## 2024-11-13 NOTE — PROGRESS NOTES
11/13/24 0900 to 1000   General   Reason for Referral decreased mobility, SDH   Referred By Dr. Patterson   Past Medical History Relevant to Rehab HTN, anxiety, dysphagia, Parkinson's, progressive supranuclear palsy, dystonia   Prior to Session Communication Bedside nurse   Patient Position Received Up in chair   Preferred Learning Style verbal;auditory   General Comment agreeable to tx, communicates mostly with thumbs up and down   Precautions   Medical Precautions Fall precautions;Swallowing precautions   Precautions Comment puree diet and thin liquids   Pain Assessment   Pain Assessment 0-10   0-10 (Numeric) Pain Score 0 - No pain   Feeding   Feeding Level of Assistance Maximum assistance   Feeding Where Assessed Bed level   Feeding Comments assist with solid food occasionally feeding self with spoon. set up with dysphgia cup   Grooming   Grooming Level of Assistance Setup   Grooming Where Assessed Wheelchair   Grooming Comments wash chair   UE Bathing   UE Bathing Adaptive Equipment Removeable shower head   UE Bathing Level of Assistance Minimum assistance   UE Bathing Where Assessed Shower   UE Bathing Comments with cues and set up patient washed upper body with min a throughness   LE Bathing   LE Bathing Adaptive Equipment Removeable shower head   LE Bathing Level of Assistance Minimum assistance   LE Bathing Where Assessed Shower   LE Bathing Comments assist with buttocks seated in rolling shower chair   UE Dressing   UE Dressing Level of Assistance Minimum assistance   UE Dressing Where Assessed Wheelchair   UE Dressing Comments assist with pulol down in back   LE Dressing   LE Dressing Yes   Pants Level of Assistance Moderate assistance  (seated in w/c able to thread pants assist with pull up at wall mounted grab bar retro pulsive  in standing)   Sock Level of Assistance   (no socks)   Shoe Level of Assistance Setup   Adult Briefs Level of Assistance Dependent   LE Dressing Where Assessed Wheelchair    Toileting   Toileting Level of Assistance Maximum assistance   Where Assessed Bedside commode   Toileting Comments assist with all parts of pants able to complete bob hygiene   Bed Mobility   Bed Mobility Yes   Bed Mobility 1   Bed Mobility 1 Supine to sitting   Level of Assistance 1 Minimum assistance   Bed Mobility Comments 1 assist with scooting to edge   Transfers   Transfer Yes   Transfer 1   Transfer From 1 Bed to   Transfer to 1 Wheelchair   Technique 1 Sit to stand;Stand to sit   Transfer Device 1 Gait belt   Transfer Level of Assistance 1 Minimum assistance   Trials/Comments 1 retropulsive cues for handplacement   Toilet Transfers   Toilet Transfer From Wheelchair   Toilet Transfer Type To and from   Toilet Transfer to Standard bedside commode  (a top toilet)   Toilet Transfer Technique To right;To left;Stand pivot   Toilet Transfers Minimal assistance  (rigid at trunk cues  for handplacement assist with slow descent. retropulsive.)   Shower Transfers   Shower Transfer From Wheelchair   Shower Transfer Type To and from   Shower Transfer to   (rolling shower chair stationary in shower for transfer)   Shower Transfer Technique Stand pivot   Shower Transfers Moderate assistance  (min a on/off cues for handplacement and cues for flat foot, retro pulsive. Rigid at trunk cues to lower slowly and min a)   IP OT Assessment   OT Assessment Patient eager to particpated in therapy, patient showing increased endurance. Will continue with POC   Prognosis Fair   Barriers to Discharge None   Evaluation/Treatment Tolerance Patient limited by fatigue   Medical Staff Made Aware Yes   End of Session Communication PCT/NA/CTA   End of Session Patient Position Up in chair   OT Assessment   OT Assessment Results Decreased ADL status;Decreased upper extremity range of motion;Decreased upper extremity strength;Decreased safe judgment during ADL;Decreased endurance;Visual deficit;Decreased fine motor control;Decreased functional  mobility;Decreased gross motor control   Strengths Ability to acquire knowledge   Barriers to Participation Comorbidities   Education   Individual(s) Educated Patient   Education Provided   (educated on safe transfers)   Risk and Benefits Discussed with Patient/Caregiver/Other yes   Patient/Caregiver Demonstrated Understanding yes   Plan of Care Discussed and Agreed Upon yes   Patient Response to Education Patient/Caregiver Verbalized Understanding of Information   Education Comment Patient demonstrated increased endurance, continues to be rigid at trunk working on slow descent with transfers. Will continue with OT POC   Inpatient/Swing Bed or Outpatient   Inpatient/Swing Bed or Outpatient Inpatient   Inpatient Plan   Treatment Interventions ADL retraining   OT - OK to Discharge Yes

## 2024-11-13 NOTE — NURSING NOTE
Sacral mepilex applied, barrier cream applied to coccyx, preventative skin measures intact (waffle mattress, preventative mepilex).

## 2024-11-13 NOTE — NURSING NOTE
Assumed care of pt @ 0730, report received, pt here @ intensive rehab s/p SDH, pt has progressive supranuclear palsy and advanced parkinson's disease. Pt is alert to self, not completely non verbal, one word answer and speech is very slowed down and incomprehensible @ times. Pt appears to be doing better overall, is more interactive with staff and is eating more. Pt has laceration to left eyebrow, has sore area to left side of her head, pt has bruising noted to left side of her body from multiple falls @ home, pt denies any pain @ this time, pt lung sounds CTA, apical regular, no edema, see shift assessment for skin documentation, call light within Reach, continuing to monitor.

## 2024-11-13 NOTE — PROGRESS NOTES
"Physical Therapy       11/13/24 8765-5807   PT  Visit   PT Received On 11/13/24   Response to Previous Treatment Patient with no complaints from previous session.   General   Reason for Referral decreased mobility, SDH   Referred By Dr. Patterson   Past Medical History Relevant to Rehab HTN, anxiety, dysphagia, Parkinson's, progressive supranuclear palsy, dystonia   Patient Position Received Up in chair  (tilt in space wheelchair)   General Comment agreeable to tx, communicates mostly with thumbs up and down and some words   Precautions   Medical Precautions Fall precautions;Swallowing precautions   Precautions Comment puree diet and thin liquids   Pain Assessment   Pain Assessment 0-10   0-10 (Numeric) Pain Score 0 - No pain   Cognition   Arousal/Alertness Delayed responses to stimuli   Orientation Level Oriented X4  (uses minimal verbalization and hand gestures to communicate)   Processing Speed Delayed   Therapeutic Exercise   Therapeutic Exercise Activity 1 sit to stand x 5 with cues for anterior weight shift and bringing COG over MARTINA with standing. Cues for trunk forward/flexion when sitting.   Ambulation/Gait Training 1   Surface 1 Level tile   Gait Support Devices Gait belt   Assistance 1 Moderate assistance   Quality of Gait 1 Diminished heel strike;Inconsistent stride length;Ataxic;NBOS   Comments/Distance (ft) 1 50' no wheelchair follow. Good MARTINA maintained with prism glasses on. No toe walking noted this session. With gait frezzing, cues to \" stop and reset\" with good carryover. PT controls speed of u step walker. One turn with increased time and mild freezing.   Transfer 1   Technique 1 Sit to stand;Stand to sit   Transfer Device 1 Gait belt   Transfer Level of Assistance 1 Contact guard;Minimum assistance   Trials/Comments 1 slight posterior lean intially when standing but can correct with cues   Other Activity   Other Activity 1 Omnicycle level 1 x 10 minutes   Other Activity 2 gentle massage and " stretching to cervical musculature with improved head position noted   Activity Tolerance   Endurance Tolerates 30 min exercise with multiple rests   PT Assessment   PT Assessment Results Decreased strength;Decreased range of motion;Decreased endurance;Impaired balance;Decreased mobility;Decreased coordination;Impaired vision;Impaired tone   Evaluation/Treatment Tolerance Patient tolerated treatment well   Barriers to Participation Comorbidities   Assessment Comment No wheelchiar follow with amb this session.   End of Session Patient Position   (tilt in space chair with KATHLEEN)   PT Plan   Inpatient/Swing Bed or Outpatient Inpatient   PT Plan   Treatment/Interventions Transfer training;Gait training;Neuromuscular re-education;Therapeutic exercise;Therapeutic activity;Postural re-education   PT Plan Ongoing PT   PT Recommended Transfer Status Assist x1   PT - OK to Discharge Yes

## 2024-11-13 NOTE — PROGRESS NOTES
IDT held this date to review patient progress in therapy and discharge plan. LSW and medical director met with patient's spouse post meeting to provide update regarding status. Reviewed patient care needs and her wishes regarding duet/PEG tube. Discussed level of care needs post discharge and home health with resumption of care through [facility] versus alternate care services such as hospice.  Spouse is interested in informational meeting with [facility]. Patient will need hospital bed, tilt in space wheelchair, and bedside commode at home. Patient's spouse is interested in purewick catheter et up however private pay cost is anticipated. Spouse is aware. ADOD remains 11.1924 and alternate discharge location declined at his time. Anticipate discharge home with hospice services pending outcome of meeting.

## 2024-11-13 NOTE — PROGRESS NOTES
Speech-Language Pathology    SLP Adult IRF Speech-Language Pathology Treatment     Patient Name: Leidy Teixeira  MRN: [id number]  Today's Date: 11/13/2024  Time Calculation  Start Time: 0810  Stop Time: 0855  Time Calculation (min): 45 min     2/3sw +sp    Current Problem:   SDH with PMH significant for HTN, anxiety, dysphagia, Parkinson's, progressive supranuclear palsy, dystonia     SLP Assessment:  SLP TX Intervention Outcome: Making Progress Towards Goals  SLP Assessment Results: Expression deficits, Other (Comment) (dysphagia)  Prognosis: Good  Treatment Tolerance: Patient tolerated treatment well  Medical Staff Made Aware: Yes  Strengths: Family/Caregiver Support, Motivation  Barriers: Comorbidities  Education Provided: Yes     Plan:  Inpatient/Swing Bed or Outpatient: Inpatient  Treatment/Interventions: Communication functioning, Other (Comment) (dysphagia)  SLP TX Plan: Continue Plan of Care  SLP Plan: Skilled SLP  SLP Frequency: 3x per week  Duration: 2 weeks  SLP Discharge Recommendations: Other (Comment) (TBD pending progress)  Next Treatment Priority: diet tolerance, strat's, comm board  Discussed POC: Patient, Nursing  Discussed Risks/Benefits: Yes, Patient, Nursing  Patient/Caregiver Agreeable: Yes      Subjective   Pt seen upright in bed. Sunglasses on due to light sensitivity.      General Visit Information:   Reason for Referral: Follow-up dysphagia and speech-language treatment  Referred By: Dr. Patterson  Past Medical History Relevant to Rehab: HTN, anxiety, dysphagia, Parkinson's, progressive supranuclear palsy, dystonia  Prior to Session Communication: Bedside nurse    Pain Assessment:  Pain Assessment  0-10 (Numeric) Pain Score: 0 - No pain      Objective   DYSPHAGIA GOALS (established 11/5 , anticipated end 2 weeks):  1) Pt to tolerate recommended diet to 90% without overt s/s of aspiration in order to ensure pt is demonstrating adequate swallow function for meeting nutrition and hydration needs.  "  PROGRESS: Staff reports increased p.o. intake with pt requesting food items. Pt had already consumed 1 Ensure via straw. She was able to self-feed OJ via dysphagia cup. She consumed pureed blueberry muffin with SLP assist. Pt was reaching out and attempting to self-feed.   STATUS: continue pureed, thin with 1:1 assist with feed and controlled sip drinks. PO intake only when pt is alert and not overly fatigued. Discussed with RN, staff.  2) Pt to demonstrate functional knowledge and use of compensatory swallowing strategies to 90% to reduce risk of aspiration and s/s dysphagia.  PROGRESS: Instruction to completely swallow before next bite/sip. Pt gave a thumbs up for \"yes\" and demonstrated carryover. Provided extra time to insure clearance.  STATUS: continue, progressing with decreased cues to swallow     LONG TERM GOAL: Patient will tolerate the least restrictive diet without overt difficulty or pulmonary compromise by time of discharge    Therapeutic Swallow:  Therapeutic Swallow Intervention : Compensatory Strategies, PO Trials  Solid Diet Recommendations: Pureed/extremely thick  (IDDSI Level 4)  Liquid Diet Recommendations: Thin (IDDSI Level 0)  Swallow Comments: medications crushed in applesauce    SPEECH-LANGUAGE GOALS (established 11/5, anticipate end 2 weeks):  Pt will accurately locate 4 icons on SGD.  PROGRESS: Pt appears more comfortable with low tech communication board in room. She was able to locate lights on/off, \"tired\" icon, and \"no pain\" on pain scale. 11/13: Did not utilize comm board as pt was using increased verbalizations today.  STATUS: continue, progressing with low tech communication board when held above nose level.  2. Pt will utilize communication board/icons to express at least 1 want/need.  PROGRESS: As above, pt was able to verbalize \"muffin\", and described home routine \"get up early\", used fingers for \"6\", \"get myself dressed\", and \"go back to bed.\"              STATUS: continue, pt " has increased verbalizations today.  3. Ongoing assessment if indicated for further goal development or to determine progress, as needed.  LTG: Pt will improve expressive communication for basic needs/wants.    Inpatient Education:  Adult Inpatient Education  Individual(s) Educated: Patient  Verbal Education : small sips, swallow completely prior to next bite/sip  Risk and Benefits Discussed with Patient/Caregiver/Other: yes  Patient/Caregiver Demonstrated Understanding: yes  Plan of Care Discussed and Agreed Upon: yes  Patient Response to Education: Patient/Caregiver Verbalized Understanding of Information  Education Comment: Discussed with RN    Consultations/Referrals/Coordination of Services: Discussed safe swallow strategies and dysphagia cup with PCA.

## 2024-11-13 NOTE — PROGRESS NOTES
11/13/24 1330 to 1400   General   Reason for Referral decreased mobility, SDH   Referred By Dr. Patterson   Past Medical History Relevant to Rehab HTN, anxiety, dysphagia, Parkinson's, progressive supranuclear palsy, dystonia   Prior to Session Communication Bedside nurse   Patient Position Received Up in chair   Preferred Learning Style verbal;auditory   General Comment agreeable to tx, communicates mostly with thumbs up and down and some words   Precautions   Hearing/Visual Limitations slowed smooth pursuits, saccades with limited eye movement especially past midline due to supranuclear palsy and resulting limited movement of eyes. Convergence/divergence absent. Must turn head to look past midline. Lower half of visual field appears to be limited. Has prism glasses but she does not like to wear them   Precautions Comment puree diet and thin liquids   Pain Assessment   Pain Assessment 0-10   0-10 (Numeric) Pain Score 0 - No pain   Therapeutic Exercise   Therapeutic Exercise Performed Yes   Therapeutic Exercise Activity 1 bilateral lora 2 lbs forward and backwards 15 reps table top for strenght and endurance for adls and Iadls   Therapeutic Activity   Therapeutic Activity 1 cordination ladder using dowel luke and bilateral hands. Used for coordination and reaching above 90 degrees patient able to complete twice to the fourth rung.   Therapeutic Activity 2 Patient over head arc for crossing midline, twisting at waist and reaching past 90 degrees completed 12 rings. patient work consistently throughout task.   OT Assessment   OT Assessment Results Decreased ADL status;Decreased upper extremity range of motion;Decreased upper extremity strength;Decreased safe judgment during ADL;Decreased endurance;Visual deficit;Decreased fine motor control;Decreased functional mobility;Decreased gross motor control   Strengths Ability to acquire knowledge   Barriers to Participation Comorbidities   Inpatient Plan   Treatment  Interventions UE strengthening/ROM;Fine motor coordination activities   OT - OK to Discharge Yes

## 2024-11-14 PROCEDURE — 92526 ORAL FUNCTION THERAPY: CPT | Mod: GN

## 2024-11-14 PROCEDURE — 97535 SELF CARE MNGMENT TRAINING: CPT | Mod: GO

## 2024-11-14 PROCEDURE — 97530 THERAPEUTIC ACTIVITIES: CPT | Mod: GO

## 2024-11-14 PROCEDURE — 92507 TX SP LANG VOICE COMM INDIV: CPT | Mod: GN

## 2024-11-14 PROCEDURE — 97110 THERAPEUTIC EXERCISES: CPT | Mod: GP

## 2024-11-14 PROCEDURE — 97116 GAIT TRAINING THERAPY: CPT | Mod: GP,CQ

## 2024-11-14 PROCEDURE — 97110 THERAPEUTIC EXERCISES: CPT | Mod: GO

## 2024-11-14 PROCEDURE — 2500000001 HC RX 250 WO HCPCS SELF ADMINISTERED DRUGS (ALT 637 FOR MEDICARE OP): Performed by: INTERNAL MEDICINE

## 2024-11-14 PROCEDURE — 97530 THERAPEUTIC ACTIVITIES: CPT | Mod: GP,CQ

## 2024-11-14 PROCEDURE — 1180000001 HC REHAB PRIVATE ROOM DAILY

## 2024-11-14 PROCEDURE — 99232 SBSQ HOSP IP/OBS MODERATE 35: CPT | Performed by: INTERNAL MEDICINE

## 2024-11-14 PROCEDURE — 97112 NEUROMUSCULAR REEDUCATION: CPT | Mod: GP,CQ

## 2024-11-14 ASSESSMENT — PAIN SCALES - GENERAL
PAINLEVEL_OUTOF10: 3
PAINLEVEL_OUTOF10: 0 - NO PAIN

## 2024-11-14 ASSESSMENT — ACTIVITIES OF DAILY LIVING (ADL)
HOME_MANAGEMENT_TIME_ENTRY: 60
BATHING_WHERE_ASSESSED: STANDING SINKSIDE;WHEELCHAIR
BATHING_LEVEL_OF_ASSISTANCE: MINIMUM ASSISTANCE

## 2024-11-14 ASSESSMENT — PAIN - FUNCTIONAL ASSESSMENT
PAIN_FUNCTIONAL_ASSESSMENT: 0-10

## 2024-11-14 ASSESSMENT — PAIN SCALES - WONG BAKER: WONGBAKER_NUMERICALRESPONSE: HURTS LITTLE BIT

## 2024-11-14 NOTE — NURSING NOTE
RN Hospice Note    Leidy Teixeira is a Hospice Patient.   Hospice terminal diagnosis: parkinsons/PSP  Physician: Dr. Patterson  Visit type: Informational visit    Comments/recommendations: This RN present, met with pt, her spouse/pcg Jim and son Ethan at bedside.  Reviewed hospice philosophy of care, services, all options with [facility].  Pt/spouse aware of pts [employer] benefit and deductibles/coverage for hospice at this time via discussion with this RN.  Pt is actively involved with PT/OT/ST in current location, spouse shared he is hopeful pt will improve, hopeful to continue PT/OT/ST at home if able.  Pt/spouse aware if pt receiving skilled care, hospice unable to be involved at this time.  Also discussed [facility] palliative care services from [facility].  Pt and spouse accepting of CG training guide, IPU booklet and [facility] contact information, wish to discuss further before making decision re hospice care.   Spouse aware consents for hospice can be sent remotely if they decide to proceed with hospice assessment/admission or can reschedule to meet to complete forms and assessment.  Updated bedside gabriel saini and attending dr. Patterson via [employer] chat.  Thanks for the consult    Discharge Planning:  Patient to be discharged to D    Plan of care reviewed with patient/family members pt, spouse/pcg Jim, son Ethan   Plan of care reviewed with hospital staff members: Dr. Patterson/Katie Arenas RN/GABRIEL Carbajal     Please notify [facility] of any changes in condition. Thank you.  Office: [phone] (8 am-6:30 pm M-F and 8 am-4:30 pm weekends and holidays)   [phone] (6:30 pm-8 am M-F and 4:30 pm-8 am weekends and holidays)    Chloe Hagan RN

## 2024-11-14 NOTE — PROGRESS NOTES
Speech-Language Pathology    SLP Adult IRF Speech-Language Pathology Treatment     Patient Name: Leidy Teixeira  MRN: [id number]  Today's Date: 11/14/2024  Time Calculation  Start Time: 0810  Stop Time: 0855  Time Calculation (min): 45 min     3/3sw +sp    Current Problem:   SDH with PMH significant for HTN, anxiety, dysphagia, Parkinson's, progressive supranuclear palsy, dystonia     SLP Assessment:  SLP TX Intervention Outcome: Making Progress Towards Goals  SLP Assessment Results: Expression deficits, Other (Comment) (dysphagia)  Prognosis: Good  Treatment Tolerance: Patient tolerated treatment well  Medical Staff Made Aware: Yes  Strengths: Family/Caregiver Support, Motivation  Barriers: Comorbidities  Education Provided: Yes     Plan:  Inpatient/Swing Bed or Outpatient: Inpatient  Treatment/Interventions: Communication functioning, Other (Comment) (dysphagia)  SLP TX Plan: Continue Plan of Care  SLP Plan: Skilled SLP  SLP Frequency: 3x per week  Duration: 2 weeks  SLP Discharge Recommendations: Other (Comment) (TBD pending progress)  Next Treatment Priority: diet tolerance, strat's, comm board  Discussed POC: Patient, Nursing  Discussed Risks/Benefits: Yes, Patient, Nursing  Patient/Caregiver Agreeable: Yes      Subjective   Pt seen upright in bed. Family has informational meeting with [facility]. Uncertain of further discharge plans at this time.     General Visit Information:   Reason for Referral: Follow-up dysphagia and speech-language treatment  Referred By: Dr. Patterson  Past Medical History Relevant to Rehab: HTN, anxiety, dysphagia, Parkinson's, progressive supranuclear palsy, dystonia  Prior to Session Communication: Bedside nurse    Pain Assessment:  Pain Assessment  0-10 (Numeric) Pain Score: 0 - No pain      Objective   DYSPHAGIA GOALS (established 11/5 , anticipated end 2 weeks):  1) Pt to tolerate recommended diet to 90% without overt s/s of aspiration in order to ensure pt  "is demonstrating adequate swallow function for meeting nutrition and hydration needs.   PROGRESS: Pt consumed almost 100% of breakfast meal. She was able to self-feed more than half carton Ensure via dysphagia cup. Asked pt if prism glasses would helpful for plate to encourage self-feeding, pt gave a thumbs up and sideways motion for \"maybe.\"   STATUS: continue pureed, thin with 1:1 assist with feed and controlled sip drinks. PO intake only when pt is alert and not overly fatigued. Pt also appears to do better when SLP is standing on pt's L side to feed.  2) Pt to demonstrate functional knowledge and use of compensatory swallowing strategies to 90% to reduce risk of aspiration and s/s dysphagia.  PROGRESS: Significantly decreased cues to swallow and to decrease rate.  STATUS: continue, progressing with decreased cues to swallow and decreased cues to self-feed, especially with liquids.     LONG TERM GOAL: Patient will tolerate the least restrictive diet without overt difficulty or pulmonary compromise by time of discharge    Therapeutic Swallow:  Therapeutic Swallow Intervention : Compensatory Strategies, PO Trials  Solid Diet Recommendations: Pureed/extremely thick  (IDDSI Level 4)  Liquid Diet Recommendations: Thin (IDDSI Level 0)  Swallow Comments: medications crushed in applesauce    SPEECH-LANGUAGE GOALS (established 11/5, anticipate end 2 weeks):  Pt will accurately locate 4 icons on SGD.  PROGRESS: Pt was able to verbalize responses today. She identified 4 icons that she would like to use more frequently, especially during times when she is nonverbal.  STATUS: continue, progressing with low tech communication board when held above nose level. Pt also does better with a 2x2 visual field choice.  2. Pt will utilize communication board/icons to express at least 1 want/need.  PROGRESS: As above, pt was able to verbalize \"call my \", \"what time is it?\", \"what day is it?\", and \"turn lights off.\" Collaboration " with patient to determine which icons to utilize, and which to enlarge. She accurately pointed to preferred selections.              STATUS: continue, pt continues to demonstrate increased verbalizations.  3. Ongoing assessment if indicated for further goal development or to determine progress, as needed.  LTG: Pt will improve expressive communication for basic needs/wants.    Inpatient Education:  Adult Inpatient Education  Individual(s) Educated: Patient  Verbal Education : slow rate, progress, goals  Risk and Benefits Discussed with Patient/Caregiver/Other: yes  Patient/Caregiver Demonstrated Understanding: yes  Plan of Care Discussed and Agreed Upon: yes  Patient Response to Education: Patient/Caregiver Verbalized Understanding of Information

## 2024-11-14 NOTE — PROGRESS NOTES
11/14/24 9083-9817   OT Last Visit   OT Received On 11/14/24   General   Reason for Referral decreased mobility, SDH   Referred By Dr. Patterson   Past Medical History Relevant to Rehab HTN, anxiety, dysphagia, Parkinson's, progressive supranuclear palsy, dystonia   Missed Visit No   Family/Caregiver Present No   Prior to Session Communication Bedside nurse   Patient Position Received Up in chair   Preferred Learning Style verbal;auditory   General Comment agreeable to tx, communicates mostly with thumbs up and down and some words   Precautions   Hearing/Visual Limitations slowed smooth pursuits, saccades with limited eye movement especially past midline due to supranuclear palsy and resulting limited movement of eyes. Convergence/divergence absent. Must turn head to look past midline. Lower half of visual field appears to be limited. Has prism glasses but she does not like to wear them   Medical Precautions Fall precautions;Swallowing precautions   Precautions Comment puree diet and thin liquids   Pain Assessment   Pain Assessment 0-10   0-10 (Numeric) Pain Score 0 - No pain   Clinical Progression Not changed   Cognition   Arousal/Alertness Delayed responses to stimuli   Orientation Level Oriented X4   Following Commands Follows one step commands with increased time   Cognition Comments patient consistent responds appropriately to yes/no questions using thumbs up/thumbs/down. Limited ability to talk due to rigid facial musculature   Attention WFL   Insight WFL   Impulsive WFL   Processing Speed Delayed   Coordination   Movements are Fluid and Coordinated No   Upper Body Coordination rigidity noted   Lower Body Coordination rigidity noted   Trunk Coordination rigidity noted   Finger to Nose Bradykinesia   Rapid Alternating Movements Bradykinesia   Alternating Toe Taps Bradykinesia   RUE    RUE  X   RUE Strength   RUE Overall Strength Deficits   LUE    LUE X   LUE AROM (degrees)   LUE AROM Comment impaired   LUE  Strength   LUE Overall Strength Deficits   Therapeutic Exercise   Therapeutic Exercise Performed Yes   Therapeutic Exercise Activity 1 UBE level 2 for 2 minutes prior to requiring a RB to improve BUE muscle strength and AROM to assist with ADLs.   Therapeutic Activity   Therapeutic Activity Performed Yes   Therapeutic Activity 1 pinch pin activity with LUE to facilitate functional reaching, visual scanning, and activity tolerance for ADLs   Therapeutic Activity 2 balloon volley for eye hand coordination, functional reaching, and activity tolerance for ADLs   IP OT Assessment   Evaluation/Treatment Tolerance Patient limited by fatigue   Medical Staff Made Aware Yes   End of Session Communication Bedside nurse   End of Session Patient Position Up in chair  (call light placed within reach and all needs met)   OT Assessment   OT Assessment Results Decreased upper extremity range of motion;Decreased upper extremity strength;Decreased endurance;Decreased fine motor control;Decreased gross motor control   Strengths Ability to acquire knowledge   Barriers to Participation Comorbidities   Education   Individual(s) Educated Patient   Home Program AROM;Strengthening   Risk and Benefits Discussed with Patient/Caregiver/Other yes   Patient/Caregiver Demonstrated Understanding yes   Plan of Care Discussed and Agreed Upon yes   Patient Response to Education Patient/Caregiver Verbalized Understanding of Information;Patient/Caregiver Performed Return Demonstration of Exercises/Activities   Inpatient/Swing Bed or Outpatient   Inpatient/Swing Bed or Outpatient Inpatient   Inpatient Plan   Treatment Interventions UE strengthening/ROM;Endurance training;Patient/family training;Fine motor coordination activities;Compensatory technique education   OT Frequency 5 times per week   OT Discharge Recommendations High intensity level of continued care   OT Recommended Transfer Status Assist of 1   OT - OK to Discharge Yes

## 2024-11-14 NOTE — PROGRESS NOTES
Physical Therapy       11/14/24 5848-3778   PT  Visit   PT Received On 11/14/24   Response to Previous Treatment Patient reporting fatigue but able to participate.   General   Reason for Referral decreased mobility, SDH   Referred By Dr. Patterson   Past Medical History Relevant to Rehab HTN, anxiety, dysphagia, Parkinson's, progressive supranuclear palsy, dystonia   Patient Position Received Up in chair   Preferred Learning Style verbal;auditory   General Comment agreeable to tx, communicates mostly with thumbs up and down and some words   Precautions   Medical Precautions Fall precautions;Swallowing precautions   Precautions Comment puree diet and thin liquids   Pain Assessment   Pain Assessment 0-10   0-10 (Numeric) Pain Score 0 - No pain   Therapeutic Exercise   Therapeutic Exercise Activity 1 Standing hip flexion, 0# BLE.  Use of AD for support.   Balance/Neuromuscular Re-Education   Balance/Neuromuscular Re-Education Activity 1 side stepping x 10 ft each direction, mild retropulsion noted.  Vc's for flat foot.  Min/modA given.   Balance/Neuromuscular Re-Education Activity 2 forward/retro amb, no AD, 10 ft x 1 each direction, min/modA with mild retropulsion noted.   Ambulation/Gait Training 1   Surface 1 Level tile   Device 1   (U step RW)   Gait Support Devices Gait belt  (Pt's prism glasses)   Assistance 1 Minimum assistance;Moderate assistance   Quality of Gait 1 Diminished heel strike;Inconsistent stride length;Ataxic;NBOS   Comments/Distance (ft) 1 100 ft x 2, turns included.  Pt Occasional freezing and toe walking noted, but easily corrected with cuing to reset.  Turns performed with vc's for WBOS.  Pt noted to handle U step RW without difficulty.   Transfer 1   Technique 1 Sit to stand;Stand to sit   Transfer Device 1 Gait belt   Transfer Level of Assistance 1 Contact guard;Minimum assistance   Trials/Comments 1 slight retropulsion with initial stand with corrections made when cued.   Transfers 2   Transfer  From 2 Wheelchair to;Mat to   Transfer to 2 Mat;Wheelchair   Technique 2 Stand pivot;To right;To left   Transfer Device 2 Gait belt   Transfer Level of Assistance 2 Minimum assistance   Trials/Comments 2 no AD used, very minimal cuing needed for technique.    Object From Floor   Comments unsafe   Other Activity   Other Activity 1 gentle massage to cervical musculature performed.   Activity Tolerance   Endurance Tolerates 30 min exercise with multiple rests   PT Assessment   PT Assessment Results Decreased strength;Decreased range of motion;Decreased endurance;Impaired balance;Decreased mobility;Decreased coordination;Impaired vision;Impaired tone   Rehab Prognosis Good   Evaluation/Treatment Tolerance Patient tolerated treatment well   Assessment Comment Gait training performed short distances (side-stepping, forward amb, retro amb) without AD with good results.  Emphasis on performing turn while walking with minimal cuing needed.   End of Session Patient Position Up in chair  (tilt-in-space)   PT Plan   Inpatient/Swing Bed or Outpatient Inpatient   PT Plan   Treatment/Interventions Bed mobility;Transfer training;Gait training;Stair training;Neuromuscular re-education;Therapeutic exercise;Therapeutic activity   PT Plan Ongoing PT   PT Recommended Transfer Status Assist x1

## 2024-11-14 NOTE — PROGRESS NOTES
Physical Therapy Treatment        11/14/24 [id number]   PT  Visit   PT Received On 11/14/24   Response to Previous Treatment Patient reporting fatigue but able to participate.   General   Reason for Referral decreased mobility, SDH   Referred By Dr. Patterson   Past Medical History Relevant to Rehab HTN, anxiety, dysphagia, Parkinson's, progressive supranuclear palsy, dystonia   Patient Position Received Up in chair   Preferred Learning Style verbal;auditory   General Comment agreeable to tx, communicates mostly with thumbs up and down and some words   Precautions   Medical Precautions Fall precautions;Swallowing precautions   Precautions Comment puree diet and thin liquids   Vital Signs   Heart Rate 72   Heart Rate Source Brachial   Resp 18   SpO2 100 %   /73   MAP (mmHg) 84   BP Location Right arm   BP Method Automatic   Pain Assessment   Pain Assessment 0-10   0-10 (Numeric) Pain Score 0 - No pain   Ambulation/Gait Training 1   Surface 1 Level tile   Device 1 No device   Gait Support Devices Gait belt   Assistance 1 Minimum assistance  (Arm and Arm assist x2)   Quality of Gait 1 Scissoring;Ataxic;Listing  (Retropulsive)   Comments/Distance (ft) 1 Ambulation 75 feet x2 with turn and back up to chair.  Scizzoring and retropulsion. Cues for wider MARTINA.   Transfer 1   Technique 1 Sit to stand;Stand to sit   Transfer Device 1 Gait belt   Transfer Level of Assistance 1 Contact guard;Minimum assistance   Trials/Comments 1 arm and arm assist up.  Retropulsive standing and initially up on toes.  Slowly lowers to flat foot   Other Activity   Other Activity 1 Omnicycle x 10 minutes level 1   Other Activity 2 Gave pt extra time to drink with dysphagia cup.   PT Assessment   Strengths Ability to acquire knowledge   Barriers to Participation Comorbidities   End of Session Patient Position Up in chair   PT Plan   Inpatient/Swing Bed or Outpatient Inpatient   PT Plan   Treatment/Interventions Bed mobility;Transfer training;Gait  training;Stair training;Balance training;Therapeutic exercise;Wheelchair management   PT Recommended Transfer Status Assist x1   PT - OK to Discharge Yes

## 2024-11-14 NOTE — PROGRESS NOTES
[facility] is scheduled to meet with patient and family at bedside this PM between 530 and 6pm. Will follow for outcome.

## 2024-11-14 NOTE — PROGRESS NOTES
"[facility]  Physician Progress Note    Subjective   Leidy Teixeira is a 54 y.o. female admitted to inpatient rehabilitation unit.    Continues to do somewhat better - and is eating better when fed.    Objective   /75 (BP Location: Right arm, Patient Position: Lying)   Pulse 67   Temp 37 °C (98.6 °F) (Tympanic)   Resp 16   Ht 1.676 m (5' 5.98\")   Wt (!) 44.3 kg (97 lb 11.5 oz)   LMP  (LMP Unknown)   SpO2 96%   BMI 16.26 kg/m²      Physical Exam  Constitutional:       General: She is not in acute distress.     Appearance: Normal appearance. She is not toxic-appearing.   HENT:      Head: Normocephalic and atraumatic.      Mouth/Throat:      Mouth: Mucous membranes are moist.   Eyes:      Pupils: Pupils are equal, round, and reactive to light.   Cardiovascular:      Rate and Rhythm: Normal rate and regular rhythm.      Heart sounds: No murmur heard.  Pulmonary:      Breath sounds: Normal breath sounds. No wheezing, rhonchi or rales.   Abdominal:      General: There is no distension.      Palpations: Abdomen is soft.   Musculoskeletal:      Right lower leg: No edema.      Left lower leg: No edema.   Neurological:      Mental Status: She is alert.      She is rigid in all extremities.  She does not make eye contact but will track.  She says few words.    Labs  BMP:        CBC:        Coagulation:       Assesment and Plan    Sdh (Subdural Hematoma) (Multi)   Parkinson's Disease With Dyskinesia and Fluctuating Manifestations   Progressive Supranuclear Palsy (Multi)      Subdural hematoma treated conservatively.  Follow-up with the surgeon as requested.      Hypertension, to continue the lisinopril.     Anxiety, depression.  Has been on the SSRI, continue.     Progressive supranuclear palsy, Parkinson's disease.  Continue home meds.    Dysphagia - SLP to continue to see her; cont 1:1 feeding    Severe malnutrition.     The  and I met " extensively with her  and I have spoken to the patient as well about alternate methods of nutrition and she continues to refuse PEG at this time.  Overall she is making some slow progress and we will continue as is and maximize her services at home.  The patient and her  are agreeable to hospice consultation.    Chaim Patterson MD

## 2024-11-14 NOTE — PROGRESS NOTES
Occupational Therapy       11/14/24 [id number]   OT Last Visit   OT Received On 11/14/24   General   Reason for Referral decreased ADL's   Referred By Dr. Patterson   Past Medical History Relevant to Rehab HTN, anxiety, dysphagia, Parkinson's, progressive supranuclear palsy, dystonia   Family/Caregiver Present No   Prior to Session Communication Bedside nurse   Patient Position Received Bed, 3 rail up;Alarm on   Preferred Learning Style verbal;visual   General Comment Pt agreeable to therapy   Precautions   Medical Precautions Fall precautions   Precautions Comment puree diet and thin liquids   Pain Assessment   Pain Assessment 0-10   0-10 (Numeric) Pain Score 0 - No pain   Cognition   Orientation Level Oriented X4   Following Commands Follows one step commands with increased time   Attention WFL   Processing Speed Delayed   Grooming   Grooming Level of Assistance Setup;Moderate assistance   Grooming Where Assessed Wheelchair   Grooming Comments to brush teeth,assist to put hair into ponytail   UE Bathing   UE Bathing Level of Assistance Minimum assistance   UE Bathing Where Assessed Wheelchair   UE Bathing Comments Verbal cues for thoroughness and set up   LE Bathing   LE Bathing Level of Assistance Minimum assistance   LE Bathing Where Assessed Standing sinkside;Wheelchair   LE Bathing Comments Assist tosteady to bathe periarea, bottom   UE Dressing   UE Dressing Level of Assistance Minimum assistance   UE Dressing Where Assessed Wheelchair   UE Dressing Comments assist to pull over head to doff shirt, able to don shirt w/ assist to pull down in back   LE Dressing   LE Dressing Yes   Pants Level of Assistance Minimum assistance  (assist to steady to pull pants up in stance)   Sock Level of Assistance   (no socks)   Shoe Level of Assistance Setup   Adult Briefs Level of Assistance Minimum assistance  (to steady to pull up in stance)   LE Dressing Where Assessed Wheelchair   LE Dressing Comments Pt able to bring legs up  onto W/c to don pants , shoes   Bed Mobility   Bed Mobility Yes   Bed Mobility 1   Bed Mobility 1 Supine to sitting   Level of Assistance 1 Close supervision   Transfers   Transfer Yes   Transfer 1   Transfer From 1 Bed to   Transfer to 1 Wheelchair   Technique 1 Sit to stand;Stand to sit   Transfer Device 1   (no device)   Transfer Level of Assistance 1 Minimum assistance   Trials/Comments 1 mild retropulsive in intial stance   Transfers 2   Transfer From 2 Wheelchair to   Transfer to 2 Stand   Technique 2 Sit to stand;Stand to sit   Transfer Device 2   (no device)   Transfer Level of Assistance 2 Minimum assistance   Trials/Comments 2 Verbal cues for hand placement   IP OT Assessment   OT Assessment Pt is making good progress toward OT POC. Pt would benefit rom continued OT services to increase independence in ADL tasks and functional mobility.   Prognosis Fair   Barriers to Discharge None   Evaluation/Treatment Tolerance Patient limited by fatigue   Medical Staff Made Aware Yes   End of Session Communication Bedside nurse   End of Session Patient Position Up in chair;Alarm on  (Pt in PT for therapy)

## 2024-11-15 PROCEDURE — 97535 SELF CARE MNGMENT TRAINING: CPT | Mod: GO,CO

## 2024-11-15 PROCEDURE — 97530 THERAPEUTIC ACTIVITIES: CPT | Mod: GP

## 2024-11-15 PROCEDURE — 97110 THERAPEUTIC EXERCISES: CPT | Mod: GP

## 2024-11-15 PROCEDURE — 97110 THERAPEUTIC EXERCISES: CPT | Mod: GO,CO

## 2024-11-15 PROCEDURE — 2500000001 HC RX 250 WO HCPCS SELF ADMINISTERED DRUGS (ALT 637 FOR MEDICARE OP): Performed by: PHYSICIAN ASSISTANT

## 2024-11-15 PROCEDURE — 2500000001 HC RX 250 WO HCPCS SELF ADMINISTERED DRUGS (ALT 637 FOR MEDICARE OP): Performed by: INTERNAL MEDICINE

## 2024-11-15 PROCEDURE — 97116 GAIT TRAINING THERAPY: CPT | Mod: GP

## 2024-11-15 PROCEDURE — 1180000001 HC REHAB PRIVATE ROOM DAILY

## 2024-11-15 PROCEDURE — 99232 SBSQ HOSP IP/OBS MODERATE 35: CPT | Performed by: PHYSICIAN ASSISTANT

## 2024-11-15 PROCEDURE — 97530 THERAPEUTIC ACTIVITIES: CPT | Mod: GO,CO

## 2024-11-15 RX ORDER — AMMONIUM LACTATE 12 G/100G
LOTION TOPICAL 2 TIMES DAILY
Status: DISCONTINUED | OUTPATIENT
Start: 2024-11-15 | End: 2024-11-19 | Stop reason: HOSPADM

## 2024-11-15 ASSESSMENT — PAIN - FUNCTIONAL ASSESSMENT
PAIN_FUNCTIONAL_ASSESSMENT: 0-10

## 2024-11-15 ASSESSMENT — PAIN SCALES - GENERAL
PAINLEVEL_OUTOF10: 0 - NO PAIN

## 2024-11-15 ASSESSMENT — ACTIVITIES OF DAILY LIVING (ADL)
HOME_MANAGEMENT_TIME_ENTRY: 15
HOME_MANAGEMENT_TIME_ENTRY: 45
BATHING_EQUIPMENT_NEEDED: REMOVEABLE SHOWER HEAD
BATHING_LEVEL_OF_ASSISTANCE: MINIMUM ASSISTANCE
BATHING_WHERE_ASSESSED: SHOWER

## 2024-11-15 NOTE — PROGRESS NOTES
Physical Therapy       11/15/24 [id number]   PT  Visit   PT Received On 11/15/24   Response to Previous Treatment Patient reporting fatigue but able to participate.   General   Reason for Referral decreased mobility, SDH   Referred By Dr. Patterson   Past Medical History Relevant to Rehab HTN, anxiety, dysphagia, Parkinson's, progressive supranuclear palsy, dystonia   General Comment More Alert   Precautions   Hearing/Visual Limitations slowed smooth pursuits, saccades with limited eye movement especially past midline due to supranuclear palsy and resulting limited movement of eyes. Convergence/divergence absent. Must turn head to look past midline. Lower half of visual field appears to be limited. Has prism glasses but she does not like to wear them   Medical Precautions Fall precautions;Swallowing precautions   Precautions Comment Needs assist for feeding   Vital Signs   Heart Rate 94   Heart Rate Source Brachial   Resp 18   SpO2 97 %   BP (!) 122/91   MAP (mmHg) 101   BP Location Right arm   BP Method Automatic   Pain Assessment   Pain Assessment 0-10   0-10 (Numeric) Pain Score 0 - No pain   Therapeutic Activity   Therapeutic Activity 1 Pt hungry.  Worked on feeding self with spoon while sitting unsupported w/c.  Mod/max assist  to get food to mouth efficiently.  Pt attempting but food falling to lap.  Took n50 mins to finish 80%.  Discussed with staff for possibility of offseting meal times so pt can have more assistance.   Other Activity   Other Activity 1 Omnicycle x 15 min 4.9 mariluz level 1 99% activity.   PT Assessment   Evaluation/Treatment Tolerance Other (Comment)  (Needs meal time assistance)   End of Session Patient Position Up in chair   PT Plan   PT Recommended Transfer Status Assist x1   PT - OK to Discharge Yes

## 2024-11-15 NOTE — PROGRESS NOTES
[facility] made this worker aware that patient and family have decided to move forward with admission. LSW spoke with patient's spouse who confirmed plan. Hospice will meet with patient and family 11.17.24 at 10am. Will update IDT of plan and discharge date.

## 2024-11-15 NOTE — NURSING NOTE
Assumed care of patient. Patient in bed, call light within reach. Bed alarm maintained for patient safety.

## 2024-11-15 NOTE — CARE PLAN
The patient's goals for the shift include      The clinical goals for the shift include [employer] needs

## 2024-11-15 NOTE — CARE PLAN
The patient's goals for the shift include      The clinical goals for the shift include [employer] needs    Over the shift, the patient did not make progress toward the following goals. Barriers to progression include Neurological illness. Recommendations to address these barriers include Reinforcement.

## 2024-11-15 NOTE — PROGRESS NOTES
Physical Therapy Treatment        11/15/24 [id number]   PT  Visit   PT Received On 11/15/24   Response to Previous Treatment Patient reporting fatigue but able to participate.   General   Reason for Referral decreased mobility, SDH   Referred By Dr. Patterson   Past Medical History Relevant to Rehab HTN, anxiety, dysphagia, Parkinson's, progressive supranuclear palsy, dystonia   General Comment More Alert   Therapeutic Exercise   Therapeutic Exercise Activity 1 Ball Squeezes 2x15   Therapeutic Exercise Activity 2 Hip Abduction with orange tband 2x15   Therapeutic Exercise Activity 3 Seted LAQ 0# BLE alternating 2x20   Therapeutic Activity   Therapeutic Activity 1 Sidestepping with BUE support min assist lists left   Therapeutic Activity 2 Pt able with cues and supervision scoot hips back in w/c to reposition hips   Balance/Neuromuscular Re-Education   Balance/Neuromuscular Re-Education Activity 1 Seated BAlloon Toss  alternating BUE  2x10   Balance/Neuromuscular Re-Education Activity 2 Seated CORE moving cones with RUE across MARTINA slow effortful but able to complete.  nLimited Cervical Rotation Right.   Balance/Neuromuscular Re-Education Activity 3 Static Standing with retropulsion min assist x2 dystonia pt elevates to toes.   Ambulation/Gait Training 1   Surface 1 Level tile   Device 1 No device   Gait Support Devices Gait belt   Assistance 1 Minimum assistance  (MIn assist x2)   Quality of Gait 1 Scissoring;Ataxic;Listing   Comments/Distance (ft) 1 100 feet x2 Frequent scizzoring and retropulsion.   Transfer 1   Transfer to 1 Stand   Technique 1 Stand pivot   Transfer Device 1 Gait belt   Transfer Level of Assistance 1 Minimum assistance;Moderate assistance   Trials/Comments 1 Needs extra time for motor planning.  LOng pauses  then  she proceeds with initiation of motor sequencing.   Stairs   Stairs Yes   Stairs   Rails 1 Bilateral   Device 1 Railing   Support Devices 1 Gait belt   Assistance 1 Minimum assistance  (min  assist 1-2)   Comment/Number of Steps 1 4- 6 inch steps @rails non reciprocally.   PT Assessment   Assessment Comment Stairs completed today.  Pt much improved from 1 week ago. More verbal, follows direction but needs extra time. Will need 24/7 assist for home.  Recommend tilt and space wheelchair for longer duration periods OOB, appointments.   End of Session Patient Position Up in chair   PT Plan   Treatment/Interventions Transfer training;Gait training;Stair training;Therapeutic exercise;Therapeutic activity;Strengthening   PT Recommended Transfer Status Assist x1   PT - OK to Discharge Yes

## 2024-11-15 NOTE — PROGRESS NOTES
Occupational Therapy       11/15/24 [id number]   OT Last Visit   OT Received On 11/15/24   General   Reason for Referral decreased mobility, SDH   Referred By Dr. Patterson   Past Medical History Relevant to Rehab HTN, anxiety, dysphagia, Parkinson's, progressive supranuclear palsy, dystonia   Prior to Session Communication Bedside nurse   Patient Position Received Bed, 3 rail up   Preferred Learning Style verbal;auditory   General Comment Agreeable to OT session.   Precautions   Hearing/Visual Limitations slowed smooth pursuits, saccades with limited eye movement especially past midline due to supranuclear palsy and resulting limited movement of eyes. Convergence/divergence absent. Must turn head to look past midline. Lower half of visual field appears to be limited. Has prism glasses but she does not like to wear them   Medical Precautions Fall precautions;Swallowing precautions   Precautions Comment puree diet and thin liquids   Pain Assessment   Pain Assessment 0-10   0-10 (Numeric) Pain Score 0 - No pain   Cognition   Arousal/Alertness Delayed responses to stimuli   Orientation Level Oriented X4   Following Commands Follows one step commands with increased time   Cognition Comments patient consistent responds appropriately to yes/no questions using thumbs up/thumbs/down. Limited ability to talk due to rigid facial musculature   Attention WFL   Insight WFL   Impulsive WFL   Processing Speed Delayed   Grooming   Grooming Level of Assistance Setup   Grooming Where Assessed Wheelchair   Grooming Comments Oral care with toothette   UE Bathing   UE Bathing Adaptive Equipment Removeable shower head   UE Bathing Level of Assistance Setup   UE Bathing Where Assessed Shower   UE Bathing Comments Seated on rolling shower chair. Close set-up. Seated throughout.   LE Bathing   LE Bathing Adaptive Equipment Removeable shower head   LE Bathing Level of Assistance Minimum assistance   LE Bathing Where Assessed Shower   LE Bathing  Comments Seated bathing of LE's.   UE Dressing   UE Dressing Level of Assistance Minimum assistance   UE Dressing Where Assessed Wheelchair   UE Dressing Comments Assist to adjust over back   LE Dressing   LE Dressing Adaptive Equipment Reacher   Pants Level of Assistance Minimum assistance   Shoe Level of Assistance Setup   Adult Briefs Level of Assistance Minimum assistance   LE Dressing Where Assessed Toilet;Wheelchair   LE Dressing Comments Pt was able to perform figure 4 technique/ knee to chest technique. Required steady assist when standing to hike over hips.   Toileting   Toileting Level of Assistance Maximum assistance   Where Assessed Bedside commode   Toileting Comments Pt required assist with pericare and standing clothing management. Decreased safety with B/L foot placement. Retropulsive.   Bed Mobility 1   Bed Mobility 1 Supine to sitting   Level of Assistance 1 Contact guard   Bed Mobility Comments 1 Assist with trunk up. Good use of bed rails. HOB elevated.   Transfer 1   Transfer From 1 Bed to   Transfer to 1 Wheelchair   Technique 1 Stand pivot   Transfer Device 1 Gait belt   Transfer Level of Assistance 1 Minimum assistance   Trials/Comments 1 Arm in arm assist. VC's for foot placement.   Toilet Transfers   Toilet Transfer From Wheelchair   Toilet Transfer Type To and from   Toilet Transfer to Standard toilet   Toilet Transfer Technique To left;To right   Toilet Transfers Moderate assistance   Toilet Transfers Comments Mod assist D/T poor foot placement and decreased ability to self correct.   Shower Transfers   Shower Transfer From   (Rolling shower chair)   Shower Transfers Dependent   Shower Transfers Comments Via rolling shower chair.   IP OT Assessment   OT Assessment Pt is making good progress toward OT POC. Pt would benefit rom continued OT services to increase independence in ADL tasks and functional mobility.   Prognosis Fair   Barriers to Discharge None   Evaluation/Treatment Tolerance  Patient tolerated treatment well   Medical Staff Made Aware Yes   End of Session Communication Bedside nurse   End of Session Patient Position Up in chair   OT Assessment   OT Assessment Results Decreased upper extremity range of motion;Decreased upper extremity strength;Decreased endurance;Decreased fine motor control;Decreased gross motor control   Strengths Ability to acquire knowledge;Attitude of self   Barriers to Participation Comorbidities   Education   Individual(s) Educated Patient   Home Program AROM;Strengthening   Patient Response to Education Patient/Caregiver Verbalized Understanding of Information;Patient/Caregiver Performed Return Demonstration of Exercises/Activities   Education Comment Patient demonstrated increased endurance, continues to be rigid at trunk working on slow descent with transfers. Will continue with OT POC   Inpatient/Swing Bed or Outpatient   Inpatient/Swing Bed or Outpatient Inpatient   Inpatient Plan   Treatment Interventions ADL retraining;Functional transfer training;Endurance training   OT Frequency 5 times per week   OT Discharge Recommendations High intensity level of continued care   OT Recommended Transfer Status Assist of 1   OT - OK to Discharge Yes

## 2024-11-15 NOTE — PROGRESS NOTES
Occupational Therapy       11/15/24 5852-2299   OT Last Visit   OT Received On 11/15/24   General   Reason for Referral decreased mobility, SDH   Referred By Dr. Patterson   Past Medical History Relevant to Rehab HTN, anxiety, dysphagia, Parkinson's, progressive supranuclear palsy, dystonia   Prior to Session Communication Bedside nurse   Patient Position Received Up in chair   Preferred Learning Style verbal;auditory   General Comment Agreeable to OT session.   Precautions   Hearing/Visual Limitations slowed smooth pursuits, saccades with limited eye movement especially past midline due to supranuclear palsy and resulting limited movement of eyes. Convergence/divergence absent. Must turn head to look past midline. Lower half of visual field appears to be limited. Has prism glasses but she does not like to wear them   Medical Precautions Fall precautions;Swallowing precautions   Precautions Comment Needs assist for feeding   Pain Assessment   Pain Assessment 0-10   0-10 (Numeric) Pain Score 0 - No pain   Cognition   Arousal/Alertness Delayed responses to stimuli   Orientation Level Oriented X4   Following Commands Follows one step commands with increased time   Cognition Comments patient consistent responds appropriately to yes/no questions using thumbs up/thumbs/down. Limited ability to talk due to rigid facial musculature   Insight WFL   Impulsive WFL   Processing Speed Delayed   Toileting   Toileting Level of Assistance Moderate assistance   Where Assessed Bedside commode   Toileting Comments Pt was able to perform anterior bob care. Able to minimall assist with clothing management.   Bed Mobility 1   Bed Mobility 1 Sitting to supine   Level of Assistance 1 Contact guard   Bed Mobility Comments 1 Assist with body adjustment during descent.   Transfer 1   Transfer From 1 Sit to   Transfer to 1 Stand   Technique 1 Sit to stand;Stand to sit   Transfer Device 1 Gait belt   Transfer Level of Assistance 1 Contact guard    Trials/Comments 1 Performed standing at grab bar. Additional time required.   Transfers 2   Transfer From 2 Wheelchair to   Transfer to 2 Bed   Technique 2 Stand pivot   Transfer Device 2 Gait belt   Transfer Level of Assistance 2 Arm in arm assistance   Trials/Comments 2 No AE.   Transfers 3   Transfer From 3 Wheelchair to   Transfer to 3 Chair with arms   Technique 3 Stand pivot   Transfer Device 3 Gait belt   Transfer Level of Assistance 3 Minimum assistance   Trials/Comments 3 Performed stand pivot transfer from WC to reg chair with arms during exercises. Assist for stability. additional time.   Toilet Transfers   Toilet Transfer From Wheelchair   Toilet Transfer Type To and from   Toilet Transfer to Standard bedside commode   Toilet Transfer Technique Stand pivot   Toilet Transfers Minimal assistance   Toilet Transfers Comments Min assist for transfer. VC's, tacile cueing and hand over hand guidance used for hand placement at times.   Therapeutic Exercise   Therapeutic Exercise Activity 1 B/L UE shoulder flexion x10 reps w/ #1   Therapeutic Exercise Activity 2 B/L UE elbow flexion x10 reps w/ #1   Therapeutic Exercise Activity 3 B/L UE wrist flexion/extension x10 reps w/ #1   IP OT Assessment   OT Assessment Pt is making good progress toward OT POC. Pt would benefit rom continued OT services to increase independence in ADL tasks and functional mobility.   Prognosis Fair   Barriers to Discharge None   Evaluation/Treatment Tolerance Patient tolerated treatment well   Medical Staff Made Aware Yes   End of Session Communication Bedside nurse   End of Session Patient Position Bed, 3 rail up   OT Assessment   OT Assessment Results Decreased upper extremity range of motion;Decreased upper extremity strength;Decreased endurance;Decreased fine motor control;Decreased gross motor control   Strengths Ability to acquire knowledge   Barriers to Participation Comorbidities   Education   Individual(s) Educated Patient    Education Provided Fall precautons;Risk and benefits of OT discussed with patient or other;POC discussed and agreed upon   Home Program AROM;Strengthening   Patient Response to Education Patient/Caregiver Verbalized Understanding of Information;Patient/Caregiver Performed Return Demonstration of Exercises/Activities   Inpatient/Swing Bed or Outpatient   Inpatient/Swing Bed or Outpatient Inpatient   Inpatient Plan   Treatment Interventions ADL retraining;Functional transfer training;UE strengthening/ROM;Endurance training   OT Frequency 5 times per week   OT Discharge Recommendations High intensity level of continued care   OT Recommended Transfer Status Assist of 1   OT - OK to Discharge Yes

## 2024-11-15 NOTE — NURSING NOTE
1930: Assume care of patient, ongoing report recieced from off goingt RN. Patient in bed awake, breathing regular, no issues at this chiquita, bed alarms on for safety, call device in close reach.  2030: Assessment completed, pt denies pain, sob, chest discomfort, nausea, chils, numbness and tingling

## 2024-11-15 NOTE — PROGRESS NOTES
"Leidy Teixeira is a 54 y.o. female on day 11 of admission presenting with SDH (subdural hematoma) (Multi).    Subjective   Pt seen while resting in bed.  She is alert and interactive.  She answers yes no questions.  Continued poor po intake.  She has declined PEG tube placement.  Pt and family met with [facility] yesterday for informational meeting.   No new issues or concerns per nursing.  She does not appear to be in any pain.  She is participating in therapy.        Objective     Physical Exam  Constitutional:       General: She is not in acute distress.  Eyes:      Conjunctiva/sclera: Conjunctivae normal.      Pupils: Pupils are equal, round, and reactive to light.   Cardiovascular:      Rate and Rhythm: Normal rate and regular rhythm.      Heart sounds: No murmur heard.  Pulmonary:      Effort: Pulmonary effort is normal.      Breath sounds: No wheezing, rhonchi or rales.   Abdominal:      General: Abdomen is flat. Bowel sounds are normal. There is no distension.      Palpations: Abdomen is soft. There is no mass.      Tenderness: There is no abdominal tenderness.   Musculoskeletal:         General: No swelling.   Skin:     General: Skin is warm and dry.      Findings: No rash.   Neurological:      Mental Status: She is alert. Mental status is at baseline.         Last Recorded Vitals  Blood pressure 104/69, pulse 59, temperature 37.1 °C (98.8 °F), temperature source Temporal, resp. rate 17, height 1.676 m (5' 5.98\"), weight (!) 44.3 kg (97 lb 11.5 oz), SpO2 97%.  Intake/Output last 3 Shifts:  No intake/output data recorded.    Relevant Results                              Assessment/Plan   Assessment & Plan  SDH (subdural hematoma) (Multi)    Parkinson's disease with dyskinesia and fluctuating manifestations    Progressive supranuclear palsy (Multi)    Severe malnutrition (Multi)    Subdural hematoma treated conservatively.  Follow-up with the surgeon as requested.       Hypertension, to " continue the lisinopril.  Blood pressures well controlled.  Monitor.      Anxiety, depression.  Has been on the SSRI, continue.     Progressive supranuclear palsy, Parkinson's disease.  Continue home meds.     Dysphagia - SLP to continue to see her; cont 1:1 feeding     Severe malnutrition.  Declined PEG tube.  Pt and family met with [facility] for informational meeting last night.              Anabel Cheung PA-C

## 2024-11-16 PROCEDURE — 97116 GAIT TRAINING THERAPY: CPT | Mod: GP,CQ

## 2024-11-16 PROCEDURE — 97530 THERAPEUTIC ACTIVITIES: CPT | Mod: GP,CQ

## 2024-11-16 PROCEDURE — 1180000001 HC REHAB PRIVATE ROOM DAILY

## 2024-11-16 PROCEDURE — 2500000001 HC RX 250 WO HCPCS SELF ADMINISTERED DRUGS (ALT 637 FOR MEDICARE OP): Performed by: INTERNAL MEDICINE

## 2024-11-16 ASSESSMENT — PAIN SCALES - PAIN ASSESSMENT IN ADVANCED DEMENTIA (PAINAD)
BODYLANGUAGE: TENSE, DISTRESSED PACING, FIDGETING
TOTALSCORE: 4
FACIALEXPRESSION: SAD, FRIGHTENED, FROWN
CONSOLABILITY: DISTRACTED OR REASSURED BY VOICE/TOUCH
BREATHING: NORMAL
NEGVOCALIZATION: OCCASIONAL MOAN/GROAN, LOW SPEECH, NEGATIVE/DISAPPROVING QUALITY

## 2024-11-16 ASSESSMENT — PAIN - FUNCTIONAL ASSESSMENT
PAIN_FUNCTIONAL_ASSESSMENT: 0-10
PAIN_FUNCTIONAL_ASSESSMENT: 0-10
PAIN_FUNCTIONAL_ASSESSMENT: FLACC (FACE, LEGS, ACTIVITY, CRY, CONSOLABILITY)

## 2024-11-16 ASSESSMENT — PAIN SCALES - GENERAL
PAINLEVEL_OUTOF10: 0 - NO PAIN

## 2024-11-16 NOTE — NURSING NOTE
Nurse to nurse report provided.  The patient is resting in bed at this time with the call light within reach.

## 2024-11-16 NOTE — PROGRESS NOTES
"Physical Therapy       11/16/24 7271-0234   PT  Visit   PT Received On 11/16/24   Response to Previous Treatment Patient reporting fatigue but able to participate.   General   Reason for Referral decreased mobility, SDH   Referred By Dr. Patterson   Past Medical History Relevant to Rehab HTN, anxiety, dysphagia, Parkinson's, progressive supranuclear palsy, dystonia   Patient Position Received Up in chair   Preferred Learning Style verbal;auditory   General Comment agreeable to tx   Precautions   Medical Precautions Fall precautions;Swallowing precautions   Precautions Comment Needs assist for feeding   Pain Assessment   Pain Assessment 0-10   0-10 (Numeric) Pain Score 0 - No pain   Ambulation/Gait Training 1   Surface 1 Level tile   Device 1 No device   Gait Support Devices Gait belt;Wheelchair follow   Assistance 1 Moderate assistance   Quality of Gait 1 Scissoring;Ataxic;Listing   Comments/Distance (ft) 1 50 ftx 1.  Less scissoring noted, but start/stops continued with cuing also needed to \"put heels down\" as pt would come up on toes at times.  Overal slow adolph.   Transfer 1   Transfer From 1 Wheelchair to;Toilet to   Transfer to 1 Toilet;Wheelchair   Technique 1 Stand pivot   Transfer Device 1 Gait belt   Transfer Level of Assistance 1 Contact guard   Trials/Comments 1 use of wall rail.  Pivot both directions.  Vc's for feet flat.   Transfers 2   Technique 2 Sit to stand;Stand to sit   Transfer Device 2 Gait belt   Transfer Level of Assistance 2 Contact guard   Stairs   Rails 1 Bilateral   Device 1 Railing   Support Devices 1 Gait belt   Assistance 1 Minimum assistance;Close supervision   Comment/Number of Steps 1 Meg x 1, close supervision x 1.  Pt retropulsive on steps, but corrects when cued.  Extra time to advance hands and take steps required due to slow movements.  Pt ascended/descended 10 steps with step-to pattern.  Vc's for WBOS.   Other Activity   Other Activity 1 extra time spent using commode.  Pt " dependent with bob care and pants/brief change   Activity Tolerance   Endurance Tolerates 30 min exercise with multiple rests   PT Assessment   PT Assessment Results Decreased strength;Decreased range of motion;Decreased endurance;Impaired balance;Decreased mobility;Decreased coordination;Impaired vision;Impaired tone   Rehab Prognosis Good   Assessment Comment Gait training performed with modA x1 and w/c follow for safety, but probably can be resumed without w/c follow during next tx.  Pt increased # of steps negotiated with good initial results, but extra time needed for this and all functional mobility tasks due to bradykinesia.   End of Session Patient Position Up in chair  (tilt-in-space)   PT Plan   Inpatient/Swing Bed or Outpatient Inpatient   PT Plan   Treatment/Interventions Bed mobility;Transfer training;Gait training;Stair training;Neuromuscular re-education;Therapeutic exercise;Therapeutic activity   PT Plan Ongoing PT   PT Recommended Transfer Status Assist x1

## 2024-11-16 NOTE — CARE PLAN
Problem: Skin  Goal: Decreased wound size/increased tissue granulation at next dressing change  Flowsheets (Taken 11/16/2024 1027)  Decreased wound size/increased tissue granulation at next dressing change: Promote sleep for wound healing  Note: .  Goal: Participates in plan/prevention/treatment measures  Flowsheets (Taken 11/16/2024 1027)  Participates in plan/prevention/treatment measures: Increase activity/out of bed for meals  Note: .  Goal: Prevent/manage excess moisture  11/16/2024 1027 by Sobeida Ramirez RN  Flowsheets (Taken 11/16/2024 1027)  Prevent/manage excess moisture: Cleanse incontinence/protect with barrier cream  Note: .  11/16/2024 1026 by Sobeida Ramirez RN  Outcome: Progressing  Goal: Prevent/minimize sheer/friction injuries  11/16/2024 1027 by Sobeida Ramirez RN  Flowsheets (Taken 11/16/2024 1027)  Prevent/minimize sheer/friction injuries:   Turn/reposition every 2 hours/use positioning/transfer devices   Use pull sheet  Note: .  11/16/2024 1026 by Sobeida Ramirez RN  Outcome: Progressing  Goal: Promote/optimize nutrition  Flowsheets (Taken 11/16/2024 1027)  Promote/optimize nutrition: Monitor/record intake including meals  Note: .  Goal: Promote skin healing  Flowsheets (Taken 11/16/2024 1027)  Promote skin healing: Assess skin/pad under line(s)/device(s)  Note: .     Problem: Skin  Goal: Participates in plan/prevention/treatment measures  Flowsheets (Taken 11/16/2024 1027)  Participates in plan/prevention/treatment measures: Increase activity/out of bed for meals  Note: .     Problem: Skin  Goal: Prevent/manage excess moisture  11/16/2024 1027 by Sobeida Ramirez RN  Flowsheets (Taken 11/16/2024 1027)  Prevent/manage excess moisture: Cleanse incontinence/protect with barrier cream  Note: .  11/16/2024 1026 by Sobeida Ramirez RN  Outcome: Progressing     Problem: Skin  Goal: Prevent/minimize sheer/friction injuries  11/16/2024 1027 by Sobeida Ramirez  RN  Flowsheets (Taken 11/16/2024 1027)  Prevent/minimize sheer/friction injuries:   Turn/reposition every 2 hours/use positioning/transfer devices   Use pull sheet  Note: .  11/16/2024 1026 by Sobeida Ramirez RN  Outcome: Progressing     Problem: Skin  Goal: Promote/optimize nutrition  Flowsheets (Taken 11/16/2024 1027)  Promote/optimize nutrition: Monitor/record intake including meals  Note: .     Problem: Skin  Goal: Promote skin healing  Flowsheets (Taken 11/16/2024 1027)  Promote skin healing: Assess skin/pad under line(s)/device(s)  Note: .     Problem: Pain - Adult  Goal: Verbalizes/displays adequate comfort level or baseline comfort level  Outcome: Progressing     Problem: Fall/Injury  Goal: Verbalize understanding of personal risk factors for fall in the hospital  Outcome: Progressing     Problem: Nutrition  Goal: Nutrition support goals are met within 48 hrs  Outcome: Progressing     Problem: Pain  Goal: Performs ADL's with improved pain control throughout shift  Outcome: Progressing   The patient's goals for the shift include      The clinical goals for the shift include maintain safety

## 2024-11-16 NOTE — CARE PLAN
Problem: Skin  Goal: Prevent/manage excess moisture  Outcome: Progressing  Goal: Prevent/minimize sheer/friction injuries  Outcome: Progressing     Problem: Pain - Adult  Goal: Verbalizes/displays adequate comfort level or baseline comfort level  Outcome: Progressing     Problem: Fall/Injury  Goal: Verbalize understanding of personal risk factors for fall in the hospital  Outcome: Progressing     Problem: Nutrition  Goal: Nutrition support goals are met within 48 hrs  Outcome: Progressing     Problem: Pain  Goal: Performs ADL's with improved pain control throughout shift  Outcome: Progressing   The patient's goals for the shift include      The clinical goals for the shift include maintain safety

## 2024-11-17 VITALS
OXYGEN SATURATION: 97 % | HEART RATE: 75 BPM | DIASTOLIC BLOOD PRESSURE: 67 MMHG | WEIGHT: 97.72 LBS | BODY MASS INDEX: 15.71 KG/M2 | SYSTOLIC BLOOD PRESSURE: 100 MMHG | TEMPERATURE: 98.1 F | RESPIRATION RATE: 16 BRPM | HEIGHT: 66 IN

## 2024-11-17 PROCEDURE — 2500000001 HC RX 250 WO HCPCS SELF ADMINISTERED DRUGS (ALT 637 FOR MEDICARE OP): Performed by: INTERNAL MEDICINE

## 2024-11-17 PROCEDURE — 1180000001 HC REHAB PRIVATE ROOM DAILY

## 2024-11-17 ASSESSMENT — PAIN SCALES - PAIN ASSESSMENT IN ADVANCED DEMENTIA (PAINAD)
CONSOLABILITY: DISTRACTED OR REASSURED BY VOICE/TOUCH
TOTALSCORE: 4
BREATHING: NORMAL
FACIALEXPRESSION: SAD, FRIGHTENED, FROWN
NEGVOCALIZATION: OCCASIONAL MOAN/GROAN, LOW SPEECH, NEGATIVE/DISAPPROVING QUALITY
BODYLANGUAGE: TENSE, DISTRESSED PACING, FIDGETING

## 2024-11-17 ASSESSMENT — PAIN - FUNCTIONAL ASSESSMENT
PAIN_FUNCTIONAL_ASSESSMENT: FLACC (FACE, LEGS, ACTIVITY, CRY, CONSOLABILITY)

## 2024-11-17 ASSESSMENT — ACTIVITIES OF DAILY LIVING (ADL): EFFECT OF PAIN ON DAILY ACTIVITIES: REST/MOVEMENT

## 2024-11-17 ASSESSMENT — PAIN SCALES - GENERAL
PAINLEVEL_OUTOF10: 0 - NO PAIN

## 2024-11-17 ASSESSMENT — PAIN DESCRIPTION - DESCRIPTORS: DESCRIPTORS: OTHER (COMMENT)

## 2024-11-17 ASSESSMENT — PAIN SCALES - WONG BAKER: WONGBAKER_NUMERICALRESPONSE: HURTS EVEN MORE

## 2024-11-17 NOTE — CARE PLAN
The patient's goals for the shift include      The clinical goals for the shift include Maintain safety

## 2024-11-17 NOTE — NURSING NOTE
Assumed care of patient at 1900.  Pt. had  s/s  of pain/discomfort. Administered Tylenol 650 mg with positive outcome. Fed pt. with a yogurt and a  magic cup with no difficulty. Safety measures remain in place. Will cont. to monitor.

## 2024-11-17 NOTE — CARE PLAN
Problem: Skin  Goal: Decreased wound size/increased tissue granulation at next dressing change  Flowsheets (Taken 11/17/2024 1033)  Decreased wound size/increased tissue granulation at next dressing change: Promote sleep for wound healing  Note: .  Goal: Participates in plan/prevention/treatment measures  Flowsheets (Taken 11/16/2024 1027)  Participates in plan/prevention/treatment measures: Increase activity/out of bed for meals  Note: .  Goal: Prevent/manage excess moisture  Outcome: Progressing  Flowsheets (Taken 11/16/2024 1027)  Prevent/manage excess moisture: Cleanse incontinence/protect with barrier cream  Note: .  Goal: Prevent/minimize sheer/friction injuries  Outcome: Progressing  Flowsheets (Taken 11/17/2024 1033)  Prevent/minimize sheer/friction injuries: Increase activity/out of bed for meals  Note: .  Goal: Promote/optimize nutrition  Flowsheets (Taken 11/16/2024 1027)  Promote/optimize nutrition: Monitor/record intake including meals  Note: .  Goal: Promote skin healing  Flowsheets (Taken 11/16/2024 1027)  Promote skin healing: Assess skin/pad under line(s)/device(s)  Note: .   The patient's goals for the shift include      The clinical goals for the shift include maintain safety

## 2024-11-17 NOTE — NURSING NOTE
Nurse to nurse report provided.  The patient is resting in bed at this time with call light within reach.

## 2024-11-17 NOTE — NURSING NOTE
RN Hospice Note    Leidy Teixeira is a Hospice Patient.   Hospice terminal diagnosis: Progressive supranuclear palsy; Parkinson's  Physician: Leonardo  Visit type: Initial Visit; Consents signed, Hospice Admission Date TBD    Comments/recommendations: Met with patient spouse and patient at bedside.  They had already spoken with AURY Hagan RN with [facility] on 11/14/24.  They did not have any further questions regarding services and were ready to sign consents. Spouse, Jim, signed the paperwork for the patient.  Reviewed right to choose attending on hospice (wish to remain with [facility]) as well as code status.  Patient and  agreed with DNR-CCO on discharge, aware they can change this any time if they so choose.  Reviewed DME for home, which will be ordered for delivery tomorrow afternoon (hospital bed, over bed table, high back wheelchair).  Spouse plans to transport patient home by private vehicle on 11/19/24; [facility] RN will come to facility that morning to finalize discharge orders/medications.    Discharge Planning:  Patient to be discharged to home    The following is to be completed:  Discharge order: yes  State DNR signed by MD: [facility]  Nursing facility referral/transfer form: n/a  Medication reconciliation: yes  PAS/RR or convalescent stay form: n/a  Prescriptions for al narcotics/new medications: yes  Transportation: to be transported via private vehicle to home  Other: n/a    Plan of care reviewed with patient/family members Jim Teixeira, spouse   Plan of care reviewed with hospital staff members: Dr. Patterson, Sobeida Ramirez, [facility], [facility]      Please notify [facility] of any changes in condition. Thank you.  Office: [phone] (8 am-6:30 pm M-F and 8 am-4:30 pm weekends and holidays)   [phone] (6:30 pm-8 am M-F and 4:30 pm-8 am weekends and holidays)    Alissa Dawn, RN

## 2024-11-18 PROCEDURE — 1180000001 HC REHAB PRIVATE ROOM DAILY

## 2024-11-18 PROCEDURE — 2500000001 HC RX 250 WO HCPCS SELF ADMINISTERED DRUGS (ALT 637 FOR MEDICARE OP): Performed by: INTERNAL MEDICINE

## 2024-11-18 PROCEDURE — 99232 SBSQ HOSP IP/OBS MODERATE 35: CPT | Performed by: INTERNAL MEDICINE

## 2024-11-18 PROCEDURE — 97530 THERAPEUTIC ACTIVITIES: CPT | Mod: GO,CO

## 2024-11-18 PROCEDURE — 97530 THERAPEUTIC ACTIVITIES: CPT | Mod: GP,CQ

## 2024-11-18 PROCEDURE — 97116 GAIT TRAINING THERAPY: CPT | Mod: GP,CQ

## 2024-11-18 PROCEDURE — 97535 SELF CARE MNGMENT TRAINING: CPT | Mod: GO,CO

## 2024-11-18 PROCEDURE — 97112 NEUROMUSCULAR REEDUCATION: CPT | Mod: GP,CQ

## 2024-11-18 SDOH — ECONOMIC STABILITY: TRANSPORTATION INSECURITY
IN THE PAST 12 MONTHS, HAS THE LACK OF TRANSPORTATION KEPT YOU FROM MEDICAL APPOINTMENTS OR FROM GETTING MEDICATIONS?: NO

## 2024-11-18 SDOH — ECONOMIC STABILITY: TRANSPORTATION INSECURITY
IN THE PAST 12 MONTHS, HAS LACK OF TRANSPORTATION KEPT YOU FROM MEETINGS, WORK, OR FROM GETTING THINGS NEEDED FOR DAILY LIVING?: NO

## 2024-11-18 ASSESSMENT — ACTIVITIES OF DAILY LIVING (ADL)
HOME_MANAGEMENT_TIME_ENTRY: 15
BATHING_LEVEL_OF_ASSISTANCE: MINIMUM ASSISTANCE
HOME_MANAGEMENT_TIME_ENTRY: 45

## 2024-11-18 ASSESSMENT — COGNITIVE AND FUNCTIONAL STATUS - GENERAL
DRESSING REGULAR LOWER BODY CLOTHING: A LOT
DRESSING REGULAR UPPER BODY CLOTHING: A LITTLE
EATING MEALS: A LITTLE
TOILETING: A LITTLE
PERSONAL GROOMING: A LITTLE
DAILY ACTIVITIY SCORE: 17
HELP NEEDED FOR BATHING: A LITTLE

## 2024-11-18 ASSESSMENT — PAIN - FUNCTIONAL ASSESSMENT
PAIN_FUNCTIONAL_ASSESSMENT: 0-10

## 2024-11-18 ASSESSMENT — PATIENT HEALTH QUESTIONNAIRE - PHQ9
10. IF YOU CHECKED OFF ANY PROBLEMS, HOW DIFFICULT HAVE THESE PROBLEMS MADE IT FOR YOU TO DO YOUR WORK, TAKE CARE OF THINGS AT HOME, OR GET ALONG WITH OTHER PEOPLE: SOMEWHAT DIFFICULT
2. FEELING DOWN, DEPRESSED OR HOPELESS: SEVERAL DAYS
SUM OF ALL RESPONSES TO PHQ9 QUESTIONS 1 & 2: 2
1. LITTLE INTEREST OR PLEASURE IN DOING THINGS: SEVERAL DAYS

## 2024-11-18 ASSESSMENT — PAIN SCALES - GENERAL
PAINLEVEL_OUTOF10: 0 - NO PAIN

## 2024-11-18 ASSESSMENT — BRIEF INTERVIEW FOR MENTAL STATUS (BIMS)
GENERAL MEMORY AND RECALL ABILITY: CURRENT SEASON
COGNITIVE PATTERN ASSESSMENT USED: STAFF ASSESSMENT

## 2024-11-18 NOTE — PROGRESS NOTES
Occupational Therapy       11/18/24 6428-3120   OT Last Visit   OT Received On 11/18/24   General   Reason for Referral decreased mobility, SDH   Referred By Dr. Patterson   Past Medical History Relevant to Rehab HTN, anxiety, dysphagia, Parkinson's, progressive supranuclear palsy, dystonia   Patient Position Received Up in chair   Preferred Learning Style verbal;auditory   General Comment agreeable to tx  (Pt spilled juice and required a clothing change.)   Precautions   Hearing/Visual Limitations slowed smooth pursuits, saccades with limited eye movement especially past midline due to supranuclear palsy and resulting limited movement of eyes. Convergence/divergence absent. Must turn head to look past midline. Lower half of visual field appears to be limited. Has prism glasses but she does not like to wear them   Medical Precautions Fall precautions;Swallowing precautions   Precautions Comment Needs assist for feeding   Pain Assessment   Pain Assessment 0-10   0-10 (Numeric) Pain Score 0 - No pain   Cognition   Arousal/Alertness Delayed responses to stimuli   Orientation Level Oriented X4   Following Commands Follows one step commands with increased time   Cognition Comments patient consistnent responds appropriately to yes/no questions using thumbs up/thumbs/down. Limited ability to talk due to rigid facial musculature   Attention WFL   Insight WFL   Impulsive WFL   Processing Speed Delayed   Coordination   Upper Body Coordination rigidity noted   Lower Body Coordination rigidity noted   Trunk Coordination rigidity noted   Finger to Nose Bradykinesia   Rapid Alternating Movements Bradykinesia   Alternating Toe Taps Bradykinesia   LE Dressing   Pants Level of Assistance Minimum assistance   Shoe Level of Assistance Setup   LE Dressing Comments Pt was able to bring knee to chest movement to thread pants. Min assist in stance for hiking over hip.   Transfer 1   Transfer From 1 Sit to   Transfer to 1 Stand   Technique 1  Sit to stand;Stand to sit   Transfer Device 1 Gait belt   Transfer Level of Assistance 1 Minimum assistance   Trials/Comments 1 HHA given   Transfers 2   Transfer From 2 Wheelchair to   Transfer to 2 Bed   Technique 2 Stand pivot   Transfer Device 2 Gait belt   Transfer Level of Assistance 2 Minimum assistance   Trials/Comments 2 No AD. Retropulsive. HHA.   Toilet Transfers   Toilet Transfer From Wheelchair   Toilet Transfer Type To and from   Toilet Transfer to Standard bedside commode   Toilet Transfer Technique Stand pivot   Toilet Transfers Moderate assistance   Toilet Transfers Comments Mod assist at this time D/T fatigue.   Car Transfers   Car Transfer Technique Stand pivot   Car Transfers Moderate assistance   Car Transfers Comments Simulated car transfer with anterior HHA.   IP OT Assessment   OT Assessment Pt continues to work toward goals in POC. Still well below baseline for mobility ADL's and overall function. Pt will D/C home with hospice on 11/19   Prognosis Fair   Barriers to Discharge None   Evaluation/Treatment Tolerance Patient tolerated treatment well   Medical Staff Made Aware Yes   End of Session Communication Bedside nurse   End of Session Patient Position Bed, 3 rail up;Alarm on   OT Assessment   OT Assessment Results Decreased upper extremity range of motion;Decreased upper extremity strength;Decreased endurance;Decreased fine motor control;Decreased gross motor control   Strengths Ability to acquire knowledge   Barriers to Participation Comorbidities   Education   Individual(s) Educated Patient   Education Provided Fall precautons;Risk and benefits of OT discussed with patient or other;POC discussed and agreed upon   Home Program AROM;Strengthening   Patient Response to Education Patient/Caregiver Verbalized Understanding of Information;Patient/Caregiver Performed Return Demonstration of Exercises/Activities   Education Comment Patient demonstrated increased endurance, continues to be rigid  at trunk working on slow descent with transfers. Will continue with OT POC   Inpatient/Swing Bed or Outpatient   Inpatient/Swing Bed or Outpatient Inpatient   Inpatient Plan   Treatment Interventions ADL retraining;Functional transfer training;UE strengthening/ROM;Endurance training   OT Frequency 5 times per week   OT Discharge Recommendations High intensity level of continued care   Equipment Recommended upon Discharge   (Hospice to supply.)   OT Recommended Transfer Status Assist of 1   OT - OK to Discharge Yes      11/18/24 [id number]   [facility] Daily Activity   Putting on and taking off regular lower body clothing 2   Bathing (including washing, rinsing, drying) 3   Putting on and taking off regular upper body clothing 3   Toileting, which includes using toilet, bedpan or urinal 3   Taking care of personal grooming such as brushing teeth 3   Eating Meals 3   Daily Activity - Total Score 17   OT Adult Other Outcome Measures   Box and Block RUE-17 blks/ min :LUE- 19 blks/ min

## 2024-11-18 NOTE — PROGRESS NOTES
Occupational Therapy  DISCHARGE STATUS  Discharge Date:  11/19/24  Reason for Discharge:  Pt has reached maximum potential at this rehabilitation setting.   Upper Extremity Status    Left Right   Dominance  X   Gross Motor Impaired Impaired   Fine Motor Impaired Impaired   Balance   Static Dynamic   Sitting fair  fair    Standing fair  fair      Visual / Perceptual  Visual Acuity Impaired   Visual Spatial Impaired   Hearing Impaired   Proprioception WFL   Praxis WFL   Memory WFL   Attention WFL   Rt/Lt Neglect WFL     Discharge Functional Status  Eating Maximum assistance   Grooming Setup   Toilet Hygiene Minimum assistance   Shower/Bathe Upper:  Setup  Lower:  Minimum assistance   Upper Body Dress Setup   Lower Body Dress Minimum assistance   On/Off Footwear Socks:   (no socks)  Shoes:  Setup   Toilet Transfer Minimal assistance   Car Transfer Maximal assistance   Meal Preparation  88   Kitchen Mobility  88        [facility] Daily Activity  Putting on and taking off regular lower body clothing: A lot  Bathing (including washing, rinsing, drying): A little  Putting on and taking off regular upper body clothing: A little  Toileting, which includes using toilet, bedpan or urinal: A lot  Taking care of personal grooming such as brushing teeth: A lot  Eating Meals: A lot  Daily Activity - Total Score: 14      Treatment Summary:  Pt attained all STM goals and grooming LTM goal.  Family training completed and AE/DME recommended (hospice will provide).  Recommend 24/7 care and supervision upon return home with spouse and son.  Goal Attainment:   Problem: ADLs LTM Goal  Goal: Patient will complete daily grooming tasks brushing teeth and washing face/hair with supervision level of assistance and PRN adaptive equipment while supported sitting.  Outcome: Met  Remaining Goals/Needs:    Problem: ADLs LTM Goal  Goal: Patient will perform UB and LB bathing seated with contact guard assist level of assistance and grab bars and shower  chair.  Outcome: Not met  Goal: Patient with complete lower body dressing with contact guard assist  level of assistance donning and doffing all LE clothes  with PRN adaptive equipment while supported sitting.  Outcome: Not met  Goal: Patient will feed self with supervision level of assistance and verbal cues using PRN adaptive equipment.  Outcome: Not met  Goal: Patient will complete toileting including clothing management/hygiene with contact guard assist  level of assistance and grab bars and bedside commode.  Outcome: Not met     Problem: TRANSFERS LTM Goals  Goal: Patient will complete functional transfer to toilet with front wheeled walker with contact guard assist  level of assistance.  Outcome: Not met  Goal: Patient will complete functional car transfer with front wheeled walker with contact guard assist level of assistance.  Outcome: Not met     Equipment Recommendations:  Hospice with supply all required equipment.  OT Plan: Pt will discharge home with family under hospice care.

## 2024-11-18 NOTE — PROGRESS NOTES
"[facility]  Physician Progress Note    Subjective   Leidy Teixeira is a 54 y.o. female admitted to inpatient rehabilitation unit.    About the same.  Met w/ hospice over weekend.    Objective   /77 (BP Location: Right arm, Patient Position: Lying)   Pulse 57   Temp 36.6 °C (97.9 °F) (Temporal)   Resp 16   Ht 1.676 m (5' 5.98\")   Wt (!) 44.3 kg (97 lb 11.5 oz)   LMP  (LMP Unknown)   SpO2 98%   BMI 16.26 kg/m²      Physical Exam  Constitutional:       General: She is not in acute distress.     Appearance: Normal appearance. She is not toxic-appearing.   HENT:      Head: Normocephalic and atraumatic.      Mouth/Throat:      Mouth: Mucous membranes are moist.   Eyes:      Pupils: Pupils are equal, round, and reactive to light.   Cardiovascular:      Rate and Rhythm: Normal rate and regular rhythm.      Heart sounds: No murmur heard.  Pulmonary:      Breath sounds: Normal breath sounds. No wheezing, rhonchi or rales.   Abdominal:      General: There is no distension.      Palpations: Abdomen is soft.   Musculoskeletal:      Right lower leg: No edema.      Left lower leg: No edema.   Neurological:      Mental Status: She is alert.      She is rigid in all extremities.  She does not make eye contact but will track.  She says few words.    Labs  BMP:        CBC:        Coagulation:       Assesment and Plan    Sdh (Subdural Hematoma) (Multi)   Parkinson's Disease With Dyskinesia and Fluctuating Manifestations   Progressive Supranuclear Palsy (Multi)      Subdural hematoma treated conservatively.  Follow-up with the surgeon as requested.      Hypertension, to continue the lisinopril.     Anxiety, depression.  Has been on the SSRI, continue.     Progressive supranuclear palsy, Parkinson's disease.  Continue home meds.    Dysphagia - SLP to continue to see her; cont 1:1 feeding    Severe malnutrition.     Plan is home w/ hospice this week.    Chaim CRUZ" MD Leonardo

## 2024-11-18 NOTE — CARE PLAN
The patient's goals for the shift include      The clinical goals for the shift include Maintain safety    Over the shift, the patient did not make progress toward the following goals. Barriers to progression include impaired mobility. Recommendations to address these barriers include improve mobility.

## 2024-11-18 NOTE — PROGRESS NOTES
"Nutrition Follow up Note    Nutrition Assessment      No plans for PEG per pt wishes. Hospice met with pt and family. Plan to discharge home with hospice tomorrow.     Nutrition History:  Food and Nutrient History: intake improving. po intake averages % over the past 10 meals documented  Energy Intake: Good > 75 %    Anthropometrics:  Ht: 167.6 cm (5' 5.98\"), Wt: (!) 44.3 kg (97 lb 11.5 oz), BMI: 16.26  IBW/kg (Dietitian Calculated): 59.09 kg  Percent of IBW: 81 %    Weight Change:  Daily Weight  11/11/24 : (!) 44.3 kg (97 lb 11.5 oz)  11/06/24 : 45.5 kg (100 lb 4.8 oz)  11/04/24 : 47.7 kg (105 lb 1.6 oz)  09/14/24 : 104#  06/18/24 : 110#      Weight History / % Weight Change: additional wts obtained via chart review. pt unable to provide info regarding wt hx. possible 5# (4.5%) wt loss over ~5 months (6/18-11/4)  Significant Weight Loss: No (not significant yet undesirable)    Nutrition Focused Physical Exam Findings: assessed 11/6/24  Subcutaneous Fat Loss  Orbital Fat Pads: Severe (dark circles, hollowing and loose skin) (scab and bruising to L eye/face)  Buccal Fat Pads: Severe (hollow, sunken and narrow face)    Muscle Wasting  Temporalis: Severe (hollowed scooping depression)  Pectoralis (Clavicular Region): Severe (protruding prominent clavicle)  Deltoid/Trapezius: Severe (squared shoulders, acromion process prominent)  Interosseous: Severe (depressed area between thumb and forefinger)    Nutrition Significant Labs:  Lab Results   Component Value Date    WBC 8.3 11/06/2024    HGB 13.3 11/06/2024    HCT 40.7 11/06/2024     11/06/2024    ALT 20 11/06/2024    AST 15 11/06/2024     11/06/2024    K 4.1 11/06/2024     11/06/2024    CREATININE 0.46 (L) 11/06/2024    BUN 32 (H) 11/06/2024    CO2 25 11/06/2024     Nutrition Specific Medications:  ammonium lactate, , Topical, BID  dextromethorphan-quinidine, 1 capsule, oral, BID  erythromycin, 1 cm, Left Eye, TID  escitalopram, 10 mg, oral, " Daily  glycopyrrolate, 1 mg, oral, Daily  lisinopril, 10 mg, oral, Daily  pramipexole, 0.5 mg, oral, Nightly      Dietary Orders (From admission, onward)       Start     Ordered    11/11/24 0844  Oral nutritional supplements  Until discontinued        Question Answer Comment   Deliver with All meals    Select supplement: Ensure Compact        11/11/24 0843    11/06/24 1519  Oral nutritional supplements  Until discontinued        Comments: Or equivalent ensure product   Question Answer Comment   Deliver with All meals    Select supplement: Ensure Compact        11/06/24 1518    11/05/24 1106  Adult diet Regular; Pureed 4; Thin 0; 1:1 Feeding  Diet effective now        Comments: Upright position, controlled sips, small bites/sips, eat/feed slowly   Question Answer Comment   Diet type Regular    Texture Pureed 4    Fluid consistency Thin 0    Select tray type: 1:1 Feeding        11/05/24 1106    11/04/24 2331  May Participate in Room Service With Assistance  ( ROOM SERVICE MAY PARTICIPATE WITH ASSISTANCE)  Once        Question:  .  Answer:  Yes    11/04/24 2330                  Estimated Needs:   Estimated Energy Needs  Total Energy Estimated Needs (kCal): 1432 kCal  Total Estimated Energy Need per Day (kCal/kg): 30 kCal/kg  Method for Estimating Needs: actual wt    Estimated Protein Needs  Total Protein Estimated Needs (g): 57 g  Total Protein Estimated Needs (g/kg): 1.2 g/kg  Method for Estimating Needs: actual wt    Estimated Fluid Needs  Method for Estimating Needs: 1 ml/kcal        Nutrition Diagnosis   Nutrition Diagnosis:  Malnutrition Diagnosis  Patient has Malnutrition Diagnosis: Yes  Diagnosis Status: Ongoing  Malnutrition Diagnosis: Severe malnutrition related to chronic disease or condition  As Evidenced by: po intake </= 75% estimated needs for >/= 1 month, severe subcutaneous fat loss and muscle wasting       Nutrition Interventions/Recommendations   Nutrition Interventions and  Recommendations:    Nutrition Prescription:  Individualized Nutrition Prescription Provided for : 1432 kcals and 57g protein to be provided via diet and supplements    Nutrition Interventions:   Food and/or Nutrient Delivery Interventions  Interventions: Meals and snacks  Meals and Snacks: Texture-modified diet  Goal: provide per SLP recs  Medical Food Supplement: Commercial beverage  Goal: ensure compact TID to provide 220 kcals and 9g protein each. if unavailable, provide equivalent ensure product. if pt desires magic cup instead of ensure, magic cup TID to provide 290 kcals and 9g protein each.    Education Documentation  No documentation found.           Nutrition Monitoring and Evaluation   Monitoring/Evaluation:   Food/Nutrient Related History Monitoring  Monitoring and Evaluation Plan: Energy intake  Energy Intake: Estimated energy intake  Criteria: pt to consume >/= 75% estimated needs       Time Spent/Follow-up:   Follow Up  Time Spent (min): 30 minutes  Last Date of Nutrition Visit: 11/18/24  Nutrition Follow-Up Needed?: 5-7 days  Follow up Comment: 11/22/24

## 2024-11-18 NOTE — CARE PLAN
The patient's goals for the shift include      The clinical goals for the shift include Maintain safety    Over the shift, the patient did not make progress toward the following goals. Barriers to progression include . Recommendations to address these barriers include .

## 2024-11-18 NOTE — PROGRESS NOTES
Physical Therapy       11/18/24 [id number]   PT  Visit   PT Received On 11/18/24   Response to Previous Treatment Patient with no complaints from previous session.   General   Reason for Referral decreased mobility, SDH   Referred By Dr. Patterson   Past Medical History Relevant to Rehab HTN, anxiety, dysphagia, Parkinson's, progressive supranuclear palsy, dystonia   Patient Position Received Up in chair   Preferred Learning Style verbal;auditory   General Comment agreeable to tx   Precautions   Medical Precautions Fall precautions;Swallowing precautions   Precautions Comment Needs assist for feeding   Pain Assessment   Pain Assessment 0-10   0-10 (Numeric) Pain Score 0 - No pain   Therapeutic Activity   Therapeutic Activity 1 Opposite elbow/knee 1x15 reps each side., Close supervision.   Therapeutic Activity 2 omnicycle x 10 minutes, level 1 resistance, 3.0 km   Balance/Neuromuscular Re-Education   Balance/Neuromuscular Re-Education Activity 1 EOM sitting, stacked orange cone pick-up from floor to reaching task.  CGA.  SLow movement   Transfer 1   Transfer From 1 Mat to;Wheelchair to   Transfer to 1 Wheelchair;Mat   Technique 1 Stand pivot   Transfer Device 1 Gait belt   Transfer Level of Assistance 1 Contact guard;Minimum assistance   Trials/Comments 1 HHA given   Activity Tolerance   Endurance Tolerates 30 min exercise with multiple rests   PT Assessment   PT Assessment Results Decreased strength;Decreased range of motion;Decreased endurance;Impaired balance;Decreased mobility;Decreased coordination;Impaired vision;Impaired tone   Rehab Prognosis Good   Evaluation/Treatment Tolerance Patient tolerated treatment well   End of Session Patient Position Up in chair   PT Plan   Inpatient/Swing Bed or Outpatient Inpatient   PT Plan   Treatment/Interventions Bed mobility;Transfer training;Gait training;Stair training;Neuromuscular re-education;Therapeutic exercise;Therapeutic activity   PT Plan Ongoing PT   PT Recommended  Transfer Status Assist x1;Assistive device   PT - OK to Discharge Yes

## 2024-11-18 NOTE — NURSING NOTE
Assumed care of patient at 1900. Pt. is resting in bed with eyes closed. No s/s of pain/discomfort.  Call light within reach. Safety measures remain in place. Will cont. to monitor.

## 2024-11-18 NOTE — PROGRESS NOTES
Physical Therapy Discharge Note       24 [id number]   PT  Visit   PT Received On 24   Response to Previous Treatment Patient with no complaints from previous session.   General   Reason for Referral decreased mobility, SDH   Referred By Dr. Patterson   Past Medical History Relevant to Rehab HTN, anxiety, dysphagia, Parkinson's, progressive supranuclear palsy, dystonia   Patient Position Received Up in chair   Preferred Learning Style verbal;auditory   General Comment agreeable to tx   Precautions   Medical Precautions Fall precautions;Swallowing precautions   Precautions Comment Needs assist for feeding   Pain Assessment   Pain Assessment 0-10   0-10 (Numeric) Pain Score 0 - No pain   Strength RLE   R Hip Flexion 5/5   R Hip Extension 5/5   R Hip ABduction 5/5   R Hip ADduction 5/5   R Knee Flexion 5/5   R Knee Extension 5/5   R Ankle Dorsiflexion 5/5   R Ankle Plantar Flexion 5/5   Strength LLE   L Hip Flexion 5/5   L Hip Extension 5/5   L Hip ABduction 5/5   L Hip ADduction 5/5   L Knee Flexion 5/5   L Knee Extension 5/5   L Ankle Dorsiflexion 5/5   L Ankle Plantar Flexion 5/5   Balance/Neuromuscular Re-Education   Balance/Neuromuscular Re-Education Activity 1 TU.9 sec, HHA x2 (from 3 min 20 sec)   Bed Mobility 1   Bed Mobility 1 Supine to sitting;Sitting to supine   Level of Assistance 1 Close supervision   Bed Mobility Comments 1 bed flat   Bed Mobility 2   Bed Mobility  2 Scooting   Level of Assistance 2 Contact guard;Minimal verbal cues   Bed Mobility Comments 2 bed flat   Bed Mobility 3   Bed Mobility 3 Rolling left;Rolling right   Level of Assistance 3 Minimum assistance   Bed Mobility Comments 3 extra time and assist wtih finding rail. Bed flat   Ambulation/Gait Training 1   Surface 1 Level tile   Device 1 No device   Gait Support Devices Gait belt;Wheelchair follow   Assistance 1 Moderate assistance  (x2)   Quality of Gait 1 Scissoring;Ataxic;Listing   Comments/Distance (ft) 1 150 ft, turns  included.  Seated rest break between trials.  Pt ataxic, scissory, retropulsive and listing to R and L inconsistently.  Pt reuqired vrequent vc's to put heels down as pt would freeze and come up on toes.   Transfer 1   Technique 1 Sit to stand;Stand to sit   Transfer Device 1 Gait belt   Transfer Level of Assistance 1 Contact guard   Transfers 2   Transfer From 2 Wheelchair to;Bed to   Transfer to 2 Bed;Wheelchair   Technique 2 Stand pivot   Transfer Device 2 Gait belt   Transfer Level of Assistance 2 Contact guard;Minimum assistance   Trials/Comments 2 no AD used.  Retropulsive   Stairs   Rails 1 Bilateral   Device 1 Railing   Support Devices 1 Gait belt   Assistance 1 Minimum assistance   Comment/Number of Steps 1 up/down 8 6-inch steps, step-to pattern.  Vc's for heels down frequently as well as for DF during descent to clear heel from lip of step.  All movements slow and effortful.   Wheelchair Activities   Propulsion Type 1 Manual   Level 1 Level tile   Method 1 Right upper extremity;Left upper extremity   Level of Assistance 1 Close supervision   Description/Details 1 150ft +, cues for obstacle avoidance   Activity Tolerance   Endurance Tolerates 30 min exercise with multiple rests   PT Assessment   PT Assessment Results Decreased strength;Decreased range of motion;Decreased endurance;Impaired balance;Decreased mobility;Decreased coordination;Impaired vision;Impaired tone   Rehab Prognosis Good   Barriers to Participation Comorbidities   Assessment Comment Pt has met 5 goals at this time.  TUG score improved from 3 min 20 sec to 47.9 sec. With hand held assist x 2.  FIST score improved from 22 to 23.  Pt will need 24/7 assist/supervision at home.  Tilt-in-space w/c hightly recommended (or custom w/c).  HEP and theraband will be issued. PT NOTE: Patient is at/near baseline of requiring assist at home due to Parkinson's and progressive supranuclear palsy. Discharging home with family and Hospice. Hospice to  provide needed DME and additional assist at home.   End of Session Patient Position Up in chair   PT Plan   Inpatient/Swing Bed or Outpatient Inpatient. Discharge home with family and Hospice.   PT Plan   Treatment/Interventions Bed mobility;Transfer training;Gait training;Stair training;Neuromuscular re-education;Therapeutic exercise;Therapeutic activity   PT Plan Ongoing PT   PT Discharge Recommendations 24 hr supervision due to cognition  (or assist)   Equipment Recommended upon Discharge   (specialty w/c and ramp recommended for home entry.)   PT Recommended Transfer Status Assist x1;Assistive device      11/18/24 1100   Timed Up and Go Test   TUG Manual 47.9 sec, HHA x 2   Other Measures   Function in Sitting Test (FIST) 23/56                    Problem: IRF PT LTG Problem  Goal: Patient will roll right and left with independent assist to facilitate mobility.  Outcome: Not met  Goal: Patient will transfer supine to sit and sit to supine with supervision assist to facilitate mobility.  Outcome: Met  Goal: Patient will transfer sit to stand and stand to sit with CGA assist to facilitate mobility.  Outcome: Met  Goal: Patient will transfer bed to chair and chair to bed with contact guard assist to facilitate mobility.  Outcome: Not met  Goal: Patient will amb 100 feet least restrictive device including two turns on even surface with minimal assist to facilitate safe mobility.    Outcome: Not met  Goal: Patient will propel wheelchair 150 feet with two turns on even surface with independent assist to facilitate safe mobility.   Outcome: Not met  Goal: Patient will perform TUG with least restrictive assistive device in 2 minutes or less to promote mobility and reduce fall risk with dynamic standing tasks.   Outcome: Met  Goal: Patient will improve FIST score to  40/56  to promote mobility and safety with seated activities.   Outcome: Not met  Goal: Patient will increase BLE strength to 4+/5 to improve functional  mobility.   Outcome: Met  Goal: Patient will reduce knee flexion contracture by 10 degrees  Outcome: Met

## 2024-11-18 NOTE — PROGRESS NOTES
Patient will discharge home tomorrow via spouse transport. [facility] will be on site in AM to finalize orders and plan for transition to hospice at home level of care. Hospice has made arrangements for DME including hospital bed, high back wheelchair, and over bed table. Patient and family aware and in agreement with plan and do not wish to appeal discharge.  IDT aware of discharge plan.

## 2024-11-18 NOTE — CARE PLAN
Problem: IRF PT LTG Problem  Goal: Patient will roll right and left with independent assist to facilitate mobility.  Outcome: Not met  Goal: Patient will transfer supine to sit and sit to supine with supervision assist to facilitate mobility.  Outcome: Met  Goal: Patient will transfer sit to stand and stand to sit with CGA assist to facilitate mobility.  Outcome: Met  Goal: Patient will transfer bed to chair and chair to bed with contact guard assist to facilitate mobility.  Outcome: Not met  Goal: Patient will amb 100 feet least restrictive device including two turns on even surface with minimal assist to facilitate safe mobility.    Outcome: Not met  Goal: Patient will propel wheelchair 150 feet with two turns on even surface with independent assist to facilitate safe mobility.   Outcome: Not met  Goal: Patient will perform TUG with least restrictive assistive device in 2 minutes or less to promote mobility and reduce fall risk with dynamic standing tasks.   Outcome: Met  Goal: Patient will improve FIST score to  40/56  to promote mobility and safety with seated activities.   Outcome: Not met  Goal: Patient will increase BLE strength to 4+/5 to improve functional mobility.   Outcome: Met  Goal: Patient will reduce knee flexion contracture by 10 degrees  Outcome: Met

## 2024-11-18 NOTE — NURSING NOTE
Assumed care of patient at report. Patient states she is hungry and was able to communicate that she wanted her room door kepted open. Patient shows no signs of pain at this time and will prepare for discharge home on Tuesday under hospice care. Will monitor through shift for changes.

## 2024-11-18 NOTE — PROGRESS NOTES
Occupational Therapy       11/18/24 1100   IP OT Assessment   OT Assessment overall function. Pt will D/C home with hospice on 11/19   Prognosis Fair   Barriers to Discharge None   Evaluation/Treatment Tolerance Patient tolerated treatment well   Medical Staff Made Aware Yes   End of Session Communication Bedside nurse   End of Session Patient Position Up in chair   OT Assessment   OT Assessment Results Decreased upper extremity range of motion;Decreased upper extremity strength;Decreased endurance;Decreased fine motor control;Decreased gross motor control   Strengths Ability to acquire knowledge   Barriers to Participation Comorbidities   Education   Individual(s) Educated Patient   Education Provided Fall precautons;Risk and benefits of OT discussed with patient or other;POC discussed and agreed upon   Home Program AROM;Strengthening   Patient Response to Education Patient/Caregiver Verbalized Understanding of Information;Patient/Caregiver Performed Return Demonstration of Exercises/Activities   Inpatient/Swing Bed or Outpatient   Inpatient/Swing Bed or Outpatient Inpatient   Inpatient Plan   Treatment Interventions ADL retraining;Functional transfer training;UE strengthening/ROM;Endurance training   OT Frequency 5 times per week   OT Discharge Recommendations High intensity level of continued care   OT Recommended Transfer Status Assist of 1   OT - OK to Discharge Yes

## 2024-11-19 VITALS
WEIGHT: 97.72 LBS | OXYGEN SATURATION: 97 % | RESPIRATION RATE: 16 BRPM | HEIGHT: 66 IN | BODY MASS INDEX: 15.71 KG/M2 | DIASTOLIC BLOOD PRESSURE: 67 MMHG | HEART RATE: 74 BPM | TEMPERATURE: 98.1 F | SYSTOLIC BLOOD PRESSURE: 105 MMHG

## 2024-11-19 PROCEDURE — 2500000001 HC RX 250 WO HCPCS SELF ADMINISTERED DRUGS (ALT 637 FOR MEDICARE OP): Performed by: INTERNAL MEDICINE

## 2024-11-19 PROCEDURE — 99239 HOSP IP/OBS DSCHRG MGMT >30: CPT | Performed by: INTERNAL MEDICINE

## 2024-11-19 ASSESSMENT — PATIENT HEALTH QUESTIONNAIRE - PHQ9
SUM OF ALL RESPONSES TO PHQ9 QUESTIONS 1 & 2: 0
2. FEELING DOWN, DEPRESSED OR HOPELESS: NOT AT ALL
1. LITTLE INTEREST OR PLEASURE IN DOING THINGS: NOT AT ALL

## 2024-11-19 ASSESSMENT — BRIEF INTERVIEW FOR MENTAL STATUS (BIMS)
WHAT MONTH IS IT: NO ANSWER
ASKED TO RECALL BED: NO ANSWER
INITIAL REPETITION OF BED BLUE SOCK - FIRST ATTEMPT: NO ANSWER
ASKED TO RECALL SOCK: NO ANSWER
BIMS SUMMARY SCORE: 99
COGNITIVE PATTERN ASSESSMENT USED: BIMS
ASKED TO RECALL BLUE: NO ANSWER
WHAT YEAR IS IT: NO ANSWER
WHAT DAY OF THE WEEK IS IT: NO ANSWER

## 2024-11-19 ASSESSMENT — PAIN SCALES - GENERAL: PAINLEVEL_OUTOF10: 0 - NO PAIN

## 2024-11-19 ASSESSMENT — PAIN - FUNCTIONAL ASSESSMENT: PAIN_FUNCTIONAL_ASSESSMENT: 0-10

## 2024-11-19 NOTE — PROGRESS NOTES
Speech-Language Pathology    Discharge Summary    Name: Leidy Teixeira  MRN: [id number]  : 1970  Date: 24    Discharge Summary: SLP    Discharge Information: Date of discharge     Therapy Summary: Pt was seen for dysphagia and expressive communication. Current diet is also patient's baseline diet, pureed/thin. Pt benefits from 1:1 assist with feed and uses a dysphagia cup to control bolus amount for liquids. Pt is most comfortable with low tech communication board from home and is able to point to icons on board. During nonverbal times, pt is most comfortable with using thumbs up and thumbs down for y/n questions.    Discharge Status: Pt will discharge home with family today. No further ST intervention.    Rehab Discharge Reason: Pt will discharge home today with hospice services.

## 2024-11-19 NOTE — DISCHARGE SUMMARY
Discharge Diagnosis  SDH (subdural hematoma) (Multi)    Issues Requiring Follow-Up  To enroll in hospice care    Test Results Pending At Discharge  Pending Labs       No current pending labs.            Hospital Course   This is a 54-year-old woman progressive supranuclear palsy and advanced Parkinson's disease who was transferred to [facility] after 2 falls, falling backward, resulting with a scalp abrasion and laceration to left eyebrow. She was subsequently found with a small left frontal convexity subdural hematoma up to 3 mm in thickness. This was stable on repeat imaging, no surgical intervention was undertaken. She did have a modified barium swallow and was diagnosed with oropharyngeal dysphagia and is on puréed solids and thin liquids at this time.      she was admitted to the acute rehabilitation unit.  She made some minimal progress.  She did start to eat more.  Her mobility improved.  I had a fabricio meeting with her .  Hospice consultation was placed.  The patient does not want a feeding tube at this time.  The patient is going to be discharged with hospice care    Pertinent Physical Exam At Time of Discharge  Physical Exam  Constitutional:       General: She is not in acute distress.     Appearance: Normal appearance. She is not toxic-appearing.   HENT:      Head: Normocephalic and atraumatic.      Mouth/Throat:      Mouth: Mucous membranes are moist.   Eyes:      Pupils: Pupils are equal, round, and reactive to light.   Cardiovascular:      Rate and Rhythm: Normal rate and regular rhythm.      Heart sounds: No murmur heard.  Pulmonary:      Breath sounds: Normal breath sounds. No wheezing, rhonchi or rales.   Abdominal:      General: There is no distension.      Palpations: Abdomen is soft.   Musculoskeletal:      Right lower leg: No edema.      Left lower leg: No edema.   Neurological:      Mental Status: She is alert.      She is rigid in all extremities.  She does not make eye  contact but will track.  She says few words.    Home Medications     Medication List      CONTINUE taking these medications     escitalopram 10 mg tablet; Commonly known as: Lexapro   lisinopril 10 mg tablet   Nuedexta 20-10 mg capsule; Generic drug: dextromethorphan-quinidine   pramipexole 0.5 mg tablet; Commonly known as: Mirapex     STOP taking these medications     carbidopa-levodopa  mg tablet; Commonly known as: Sinemet   glycopyrrolate 0.2 mg/mL Oral Solution; Commonly known as: Cuvposa       Outpatient Follow-Up  No future appointments.    Chaim Patterson MD

## 2024-11-19 NOTE — CARE PLAN
The patient's goals for the shift include  sleep    The clinical goals for the shift include maintain safety